# Patient Record
Sex: MALE | Race: BLACK OR AFRICAN AMERICAN | NOT HISPANIC OR LATINO
[De-identification: names, ages, dates, MRNs, and addresses within clinical notes are randomized per-mention and may not be internally consistent; named-entity substitution may affect disease eponyms.]

---

## 2018-03-16 ENCOUNTER — TRANSCRIPTION ENCOUNTER (OUTPATIENT)
Age: 48
End: 2018-03-16

## 2019-06-01 ENCOUNTER — OUTPATIENT (OUTPATIENT)
Dept: OUTPATIENT SERVICES | Facility: HOSPITAL | Age: 49
LOS: 1 days | End: 2019-06-01
Payer: COMMERCIAL

## 2019-06-01 PROCEDURE — G9001: CPT

## 2019-06-04 ENCOUNTER — INPATIENT (INPATIENT)
Facility: HOSPITAL | Age: 49
LOS: 3 days | Discharge: HOME | End: 2019-06-08
Attending: INTERNAL MEDICINE | Admitting: INTERNAL MEDICINE
Payer: MEDICAID

## 2019-06-04 VITALS
TEMPERATURE: 98 F | OXYGEN SATURATION: 100 % | SYSTOLIC BLOOD PRESSURE: 151 MMHG | HEART RATE: 115 BPM | RESPIRATION RATE: 20 BRPM | DIASTOLIC BLOOD PRESSURE: 99 MMHG

## 2019-06-04 LAB
ALBUMIN SERPL ELPH-MCNC: 3.7 G/DL — SIGNIFICANT CHANGE UP (ref 3.3–5)
ALP SERPL-CCNC: 126 U/L — HIGH (ref 40–120)
ALT FLD-CCNC: 171 U/L — HIGH (ref 4–41)
AMPHET UR-MCNC: NEGATIVE — SIGNIFICANT CHANGE UP
ANION GAP SERPL CALC-SCNC: 17 MMO/L — HIGH (ref 7–14)
APAP SERPL-MCNC: < 15 UG/ML — LOW (ref 15–25)
APPEARANCE UR: SIGNIFICANT CHANGE UP
APTT BLD: 32.1 SEC — SIGNIFICANT CHANGE UP (ref 27.5–36.3)
AST SERPL-CCNC: 309 U/L — HIGH (ref 4–40)
BACTERIA # UR AUTO: NEGATIVE — SIGNIFICANT CHANGE UP
BARBITURATES UR SCN-MCNC: NEGATIVE — SIGNIFICANT CHANGE UP
BASE EXCESS BLDV CALC-SCNC: 1.6 MMOL/L — SIGNIFICANT CHANGE UP
BASOPHILS # BLD AUTO: 0.03 K/UL — SIGNIFICANT CHANGE UP (ref 0–0.2)
BASOPHILS NFR BLD AUTO: 0.2 % — SIGNIFICANT CHANGE UP (ref 0–2)
BENZODIAZ UR-MCNC: NEGATIVE — SIGNIFICANT CHANGE UP
BILIRUB SERPL-MCNC: 3.7 MG/DL — HIGH (ref 0.2–1.2)
BILIRUB UR-MCNC: SIGNIFICANT CHANGE UP
BLOOD GAS VENOUS - CREATININE: 0.9 MG/DL — SIGNIFICANT CHANGE UP (ref 0.5–1.3)
BLOOD GAS VENOUS - FIO2: 21 — SIGNIFICANT CHANGE UP
BLOOD UR QL VISUAL: NEGATIVE — SIGNIFICANT CHANGE UP
BUN SERPL-MCNC: 11 MG/DL — SIGNIFICANT CHANGE UP (ref 7–23)
CALCIUM SERPL-MCNC: 9.5 MG/DL — SIGNIFICANT CHANGE UP (ref 8.4–10.5)
CANNABINOIDS UR-MCNC: NEGATIVE — SIGNIFICANT CHANGE UP
CHLORIDE BLDV-SCNC: 101 MMOL/L — SIGNIFICANT CHANGE UP (ref 96–108)
CHLORIDE SERPL-SCNC: 92 MMOL/L — LOW (ref 98–107)
CK SERPL-CCNC: 241 U/L — HIGH (ref 30–200)
CO2 SERPL-SCNC: 21 MMOL/L — LOW (ref 22–31)
COCAINE METAB.OTHER UR-MCNC: NEGATIVE — SIGNIFICANT CHANGE UP
COLOR SPEC: SIGNIFICANT CHANGE UP
CREAT SERPL-MCNC: 0.83 MG/DL — SIGNIFICANT CHANGE UP (ref 0.5–1.3)
EOSINOPHIL # BLD AUTO: 0.02 K/UL — SIGNIFICANT CHANGE UP (ref 0–0.5)
EOSINOPHIL NFR BLD AUTO: 0.2 % — SIGNIFICANT CHANGE UP (ref 0–6)
ETHANOL BLD-MCNC: < 10 MG/DL — SIGNIFICANT CHANGE UP
GAS PNL BLDV: 132 MMOL/L — LOW (ref 136–146)
GLUCOSE BLDV-MCNC: 96 MG/DL — SIGNIFICANT CHANGE UP (ref 70–99)
GLUCOSE SERPL-MCNC: 94 MG/DL — SIGNIFICANT CHANGE UP (ref 70–99)
GLUCOSE UR-MCNC: NEGATIVE — SIGNIFICANT CHANGE UP
HCO3 BLDV-SCNC: 25 MMOL/L — SIGNIFICANT CHANGE UP (ref 20–27)
HCT VFR BLD CALC: 46.3 % — SIGNIFICANT CHANGE UP (ref 39–50)
HCT VFR BLDV CALC: 52.1 % — HIGH (ref 39–51)
HGB BLD-MCNC: 16.8 G/DL — SIGNIFICANT CHANGE UP (ref 13–17)
HGB BLDV-MCNC: 17 G/DL — SIGNIFICANT CHANGE UP (ref 13–17)
HYALINE CASTS # UR AUTO: NEGATIVE — SIGNIFICANT CHANGE UP
IMM GRANULOCYTES NFR BLD AUTO: 0.3 % — SIGNIFICANT CHANGE UP (ref 0–1.5)
INR BLD: 1.17 — SIGNIFICANT CHANGE UP (ref 0.88–1.17)
KETONES UR-MCNC: HIGH
LACTATE BLDV-MCNC: 2.5 MMOL/L — HIGH (ref 0.5–2)
LEUKOCYTE ESTERASE UR-ACNC: NEGATIVE — SIGNIFICANT CHANGE UP
LIDOCAIN IGE QN: 416.1 U/L — HIGH (ref 7–60)
LYMPHOCYTES # BLD AUTO: 17.2 % — SIGNIFICANT CHANGE UP (ref 13–44)
LYMPHOCYTES # BLD AUTO: 2.1 K/UL — SIGNIFICANT CHANGE UP (ref 1–3.3)
MCHC RBC-ENTMCNC: 36.2 PG — HIGH (ref 27–34)
MCHC RBC-ENTMCNC: 36.3 % — HIGH (ref 32–36)
MCV RBC AUTO: 99.8 FL — SIGNIFICANT CHANGE UP (ref 80–100)
METHADONE UR-MCNC: NEGATIVE — SIGNIFICANT CHANGE UP
MONOCYTES # BLD AUTO: 0.68 K/UL — SIGNIFICANT CHANGE UP (ref 0–0.9)
MONOCYTES NFR BLD AUTO: 5.6 % — SIGNIFICANT CHANGE UP (ref 2–14)
NEUTROPHILS # BLD AUTO: 9.32 K/UL — HIGH (ref 1.8–7.4)
NEUTROPHILS NFR BLD AUTO: 76.5 % — SIGNIFICANT CHANGE UP (ref 43–77)
NITRITE UR-MCNC: NEGATIVE — SIGNIFICANT CHANGE UP
NRBC # FLD: 0 K/UL — SIGNIFICANT CHANGE UP (ref 0–0)
OPIATES UR-MCNC: NEGATIVE — SIGNIFICANT CHANGE UP
OXYCODONE UR-MCNC: NEGATIVE — SIGNIFICANT CHANGE UP
PCO2 BLDV: 44 MMHG — SIGNIFICANT CHANGE UP (ref 41–51)
PCP UR-MCNC: NEGATIVE — SIGNIFICANT CHANGE UP
PH BLDV: 7.39 PH — SIGNIFICANT CHANGE UP (ref 7.32–7.43)
PH UR: 7 — SIGNIFICANT CHANGE UP (ref 5–8)
PLATELET # BLD AUTO: 138 K/UL — LOW (ref 150–400)
PMV BLD: 12.5 FL — SIGNIFICANT CHANGE UP (ref 7–13)
PO2 BLDV: 52 MMHG — HIGH (ref 35–40)
POTASSIUM BLDV-SCNC: 5.8 MMOL/L — HIGH (ref 3.4–4.5)
POTASSIUM SERPL-MCNC: 4.9 MMOL/L — SIGNIFICANT CHANGE UP (ref 3.5–5.3)
POTASSIUM SERPL-SCNC: 4.9 MMOL/L — SIGNIFICANT CHANGE UP (ref 3.5–5.3)
PROT SERPL-MCNC: 7.4 G/DL — SIGNIFICANT CHANGE UP (ref 6–8.3)
PROT UR-MCNC: 100 — HIGH
PROTHROM AB SERPL-ACNC: 13.1 SEC — SIGNIFICANT CHANGE UP (ref 9.8–13.1)
RBC # BLD: 4.64 M/UL — SIGNIFICANT CHANGE UP (ref 4.2–5.8)
RBC # FLD: 11.6 % — SIGNIFICANT CHANGE UP (ref 10.3–14.5)
RBC CASTS # UR COMP ASSIST: HIGH (ref 0–?)
SALICYLATES SERPL-MCNC: < 5 MG/DL — LOW (ref 15–30)
SAO2 % BLDV: 82.5 % — SIGNIFICANT CHANGE UP (ref 60–85)
SODIUM SERPL-SCNC: 130 MMOL/L — LOW (ref 135–145)
SP GR SPEC: 1.03 — SIGNIFICANT CHANGE UP (ref 1–1.04)
SQUAMOUS # UR AUTO: SIGNIFICANT CHANGE UP
TSH SERPL-MCNC: 2.31 UIU/ML — SIGNIFICANT CHANGE UP (ref 0.27–4.2)
UROBILINOGEN FLD QL: HIGH
WBC # BLD: 12.19 K/UL — HIGH (ref 3.8–10.5)
WBC # FLD AUTO: 12.19 K/UL — HIGH (ref 3.8–10.5)
WBC UR QL: SIGNIFICANT CHANGE UP (ref 0–?)

## 2019-06-04 PROCEDURE — 76705 ECHO EXAM OF ABDOMEN: CPT | Mod: 26

## 2019-06-04 PROCEDURE — 71046 X-RAY EXAM CHEST 2 VIEWS: CPT | Mod: 26

## 2019-06-04 RX ORDER — KETOROLAC TROMETHAMINE 30 MG/ML
15 SYRINGE (ML) INJECTION ONCE
Refills: 0 | Status: DISCONTINUED | OUTPATIENT
Start: 2019-06-04 | End: 2019-06-04

## 2019-06-04 RX ORDER — ACETAMINOPHEN 500 MG
650 TABLET ORAL ONCE
Refills: 0 | Status: COMPLETED | OUTPATIENT
Start: 2019-06-04 | End: 2019-06-04

## 2019-06-04 RX ORDER — PIPERACILLIN AND TAZOBACTAM 4; .5 G/20ML; G/20ML
3.38 INJECTION, POWDER, LYOPHILIZED, FOR SOLUTION INTRAVENOUS ONCE
Refills: 0 | Status: COMPLETED | OUTPATIENT
Start: 2019-06-04 | End: 2019-06-05

## 2019-06-04 RX ORDER — SODIUM CHLORIDE 9 MG/ML
1000 INJECTION INTRAMUSCULAR; INTRAVENOUS; SUBCUTANEOUS ONCE
Refills: 0 | Status: COMPLETED | OUTPATIENT
Start: 2019-06-04 | End: 2019-06-04

## 2019-06-04 RX ORDER — ONDANSETRON 8 MG/1
4 TABLET, FILM COATED ORAL ONCE
Refills: 0 | Status: COMPLETED | OUTPATIENT
Start: 2019-06-04 | End: 2019-06-04

## 2019-06-04 RX ADMIN — SODIUM CHLORIDE 1000 MILLILITER(S): 9 INJECTION INTRAMUSCULAR; INTRAVENOUS; SUBCUTANEOUS at 22:36

## 2019-06-04 RX ADMIN — Medication 1 MILLIGRAM(S): at 21:12

## 2019-06-04 RX ADMIN — ONDANSETRON 4 MILLIGRAM(S): 8 TABLET, FILM COATED ORAL at 21:11

## 2019-06-04 RX ADMIN — Medication 15 MILLIGRAM(S): at 21:12

## 2019-06-04 RX ADMIN — SODIUM CHLORIDE 1000 MILLILITER(S): 9 INJECTION INTRAMUSCULAR; INTRAVENOUS; SUBCUTANEOUS at 21:13

## 2019-06-04 NOTE — ED PROVIDER NOTE - ATTENDING CONTRIBUTION TO CARE
49 year old male daily drinker presents with abd pain and confusion. + vomiting. concern for etoh withdrawal, appy, gb disease, pancreatitis, electrolyte abns, colitis, uti.  patient febrile elysia give ativan fluids, send cultures.  will cover with abx

## 2019-06-04 NOTE — ED ADULT NURSE NOTE - OBJECTIVE STATEMENT
49y M AaOX4 c/o abdominal pain x1 day. pt noted with non radiating lower abdominal pain of a 7/10. pt febrile upon initial assessment. 49y M AaOX4 c/o abdominal pain x1 day. pt noted with non radiating lower abdominal pain of a 7/10. pt febrile upon initial assessment. pt states that he has been endorsing nausea and vomiting x1 day, pt unable to keep food /liquids down. pt denies cp/palpitations/diarrhea and burning during urination. pt noted with tea colored urine. pt states that he drinks almost everyday of 5-6 beers a day. pt noted to also be anxious and endorsing hand tremors. md made aware.

## 2019-06-04 NOTE — ED PROVIDER NOTE - CLINICAL SUMMARY MEDICAL DECISION MAKING FREE TEXT BOX
abd pain, n/v, fever, pt is daily etoh user, concern for withdrawal  will get labs, Ativan, EKG, Ct abd pelvis, IV hydration, will most likely admit.

## 2019-06-04 NOTE — ED ADULT TRIAGE NOTE - CHIEF COMPLAINT QUOTE
c/o back pain with fever and chills, pt endorses episodes of confusion and inability to focus, pt with tremors in triage, reports drinking 2 beers daily for 2 months, last drink 2 days ago. calm  and cooperative, denies medical Hx.  pt answers to questions appropriately. denies burning on urination, change in urine or foul smell. c/o back pain with fever and chills, pt endorses episodes of confusion and inability to focus, pt with tremors in triage, reports drinking 2 beers daily for 2 months, last drink 2 days ago. calm  and cooperative, denies medical Hx.  pt answers to questions appropriately. denies burning on urination, change in urine or foul smell.    pt family member has come up to desk multiple times screaming about wait time. explained to family about wait time. pt in NAD Will continue to monitor the pt c/o back pain with fever and chills, pt endorses episodes of confusion and inability to focus, pt with tremors in triage, reports drinking 2 beers daily for 2 months, last drink 2 days ago. calm  and cooperative, denies medical Hx.  pt answers to questions appropriately. denies burning on urination, change in urine or foul smell.    pt family member has come up to desk multiple times screaming about wait time. explained to family about wait time. pt in NAD Will continue to monitor the pt    0746 pt sister again came up to triage screaming about the wait time and that her brother is in pain. explained to patient that her brother has to go to main ER due to being a level 2. pt appears comfortable Charge Nurse and OCHOA Vaz made aware c/o back pain with fever and chills, pt endorses episodes of confusion and inability to focus, pt with tremors in triage, reports drinking 2 beers daily for 2 months, last drink 2 days ago. calm  and cooperative, denies medical Hx.  pt answers to questions appropriately. denies burning on urination, change in urine or foul smell.    pt family member has come up to desk multiple times screaming about wait time. explained to family about wait time. pt in NAD Will continue to monitor the pt    746 pm  pt sister again came up to triage screaming about the wait time and that her brother is in pain. explained to patient that her brother has to go to main ER due to being a level 2. pt appears comfortable Charge Nurse and OCHOA Vaz made aware

## 2019-06-04 NOTE — ED ADULT NURSE NOTE - CHIEF COMPLAINT QUOTE
c/o back pain with fever and chills, pt endorses episodes of confusion and inability to focus, pt with tremors in triage, reports drinking 2 beers daily for 2 months, last drink 2 days ago. calm  and cooperative, denies medical Hx.  pt answers to questions appropriately. denies burning on urination, change in urine or foul smell.    pt family member has come up to desk multiple times screaming about wait time. explained to family about wait time. pt in NAD Will continue to monitor the pt    746 pm  pt sister again came up to triage screaming about the wait time and that her brother is in pain. explained to patient that her brother has to go to main ER due to being a level 2. pt appears comfortable Charge Nurse and OCHOA Vaz made aware

## 2019-06-04 NOTE — ED PROVIDER NOTE - OBJECTIVE STATEMENT
Pt is 50 y/o male with Pmxh of HTN (not on meds), HLD, Etoh abuse (pt states he drinks 6 pack of beer daily with the last drink at 10pm, denies hx of withdrawals and DTs) here for eval of abd pain and back pain as well as subjective fever since today (pt is febrile to 101.4 rectally in the ER). 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 no meds taken pta. as per triage note pt is confused - pt A&O x 3, denies HA, dizziness, denies hallucinations, depression, denies SI/HI. denies hx of intrabd sx.

## 2019-06-04 NOTE — ED ADULT NURSE NOTE - NSIMPLEMENTINTERV_GEN_ALL_ED
Implemented All Universal Safety Interventions:  [place] to call system. Call bell, personal items and telephone within reach. Instruct patient to call for assistance. Room bathroom lighting operational. Non-slip footwear when patient is off stretcher. Physically safe environment: no spills, clutter or unnecessary equipment. Stretcher in lowest position, wheels locked, appropriate side rails in place.

## 2019-06-05 DIAGNOSIS — R65.10 SYSTEMIC INFLAMMATORY RESPONSE SYNDROME (SIRS) OF NON-INFECTIOUS ORIGIN WITHOUT ACUTE ORGAN DYSFUNCTION: ICD-10-CM

## 2019-06-05 DIAGNOSIS — K85.20 ALCOHOL INDUCED ACUTE PANCREATITIS WITHOUT NECROSIS OR INFECTION: ICD-10-CM

## 2019-06-05 DIAGNOSIS — F10.230 ALCOHOL DEPENDENCE WITH WITHDRAWAL, UNCOMPLICATED: ICD-10-CM

## 2019-06-05 DIAGNOSIS — Z29.9 ENCOUNTER FOR PROPHYLACTIC MEASURES, UNSPECIFIED: ICD-10-CM

## 2019-06-05 DIAGNOSIS — F17.200 NICOTINE DEPENDENCE, UNSPECIFIED, UNCOMPLICATED: ICD-10-CM

## 2019-06-05 DIAGNOSIS — K85.90 ACUTE PANCREATITIS WITHOUT NECROSIS OR INFECTION, UNSPECIFIED: ICD-10-CM

## 2019-06-05 DIAGNOSIS — F10.10 ALCOHOL ABUSE, UNCOMPLICATED: ICD-10-CM

## 2019-06-05 DIAGNOSIS — K70.10 ALCOHOLIC HEPATITIS WITHOUT ASCITES: ICD-10-CM

## 2019-06-05 LAB
ALBUMIN SERPL ELPH-MCNC: 3 G/DL — LOW (ref 3.3–5)
ALP SERPL-CCNC: 92 U/L — SIGNIFICANT CHANGE UP (ref 40–120)
ALT FLD-CCNC: 106 U/L — HIGH (ref 4–41)
ANION GAP SERPL CALC-SCNC: 14 MMO/L — SIGNIFICANT CHANGE UP (ref 7–14)
AST SERPL-CCNC: 224 U/L — HIGH (ref 4–40)
BASE EXCESS BLDV CALC-SCNC: 1.4 MMOL/L — SIGNIFICANT CHANGE UP
BILIRUB SERPL-MCNC: 3.1 MG/DL — HIGH (ref 0.2–1.2)
BLOOD GAS VENOUS - CREATININE: 0.9 MG/DL — SIGNIFICANT CHANGE UP (ref 0.5–1.3)
BLOOD GAS VENOUS - FIO2: 21 — SIGNIFICANT CHANGE UP
BUN SERPL-MCNC: 9 MG/DL — SIGNIFICANT CHANGE UP (ref 7–23)
CALCIUM SERPL-MCNC: 8.4 MG/DL — SIGNIFICANT CHANGE UP (ref 8.4–10.5)
CHLORIDE BLDV-SCNC: 104 MMOL/L — SIGNIFICANT CHANGE UP (ref 96–108)
CHLORIDE SERPL-SCNC: 100 MMOL/L — SIGNIFICANT CHANGE UP (ref 98–107)
CO2 SERPL-SCNC: 18 MMOL/L — LOW (ref 22–31)
CREAT SERPL-MCNC: 0.74 MG/DL — SIGNIFICANT CHANGE UP (ref 0.5–1.3)
GAS PNL BLDV: 132 MMOL/L — LOW (ref 136–146)
GLUCOSE BLDV-MCNC: 88 MG/DL — SIGNIFICANT CHANGE UP (ref 70–99)
GLUCOSE SERPL-MCNC: 97 MG/DL — SIGNIFICANT CHANGE UP (ref 70–99)
HAV IGM SER-ACNC: NONREACTIVE — SIGNIFICANT CHANGE UP
HBV CORE IGM SER-ACNC: NONREACTIVE — SIGNIFICANT CHANGE UP
HBV SURFACE AG SER-ACNC: NONREACTIVE — SIGNIFICANT CHANGE UP
HCO3 BLDV-SCNC: 25 MMOL/L — SIGNIFICANT CHANGE UP (ref 20–27)
HCT VFR BLDV CALC: 41.2 % — SIGNIFICANT CHANGE UP (ref 39–51)
HCV AB S/CO SERPL IA: 0.16 S/CO — SIGNIFICANT CHANGE UP (ref 0–0.99)
HCV AB SERPL-IMP: SIGNIFICANT CHANGE UP
HGB BLDV-MCNC: 13.4 G/DL — SIGNIFICANT CHANGE UP (ref 13–17)
HIV 1+2 AB+HIV1 P24 AG SERPL QL IA: SIGNIFICANT CHANGE UP
LACTATE BLDV-MCNC: 1 MMOL/L — SIGNIFICANT CHANGE UP (ref 0.5–2)
MAGNESIUM SERPL-MCNC: 1.7 MG/DL — SIGNIFICANT CHANGE UP (ref 1.6–2.6)
PCO2 BLDV: 45 MMHG — SIGNIFICANT CHANGE UP (ref 41–51)
PH BLDV: 7.38 PH — SIGNIFICANT CHANGE UP (ref 7.32–7.43)
PHOSPHATE SERPL-MCNC: SIGNIFICANT CHANGE UP MG/DL (ref 2.5–4.5)
PO2 BLDV: 43 MMHG — HIGH (ref 35–40)
POTASSIUM BLDV-SCNC: 3.5 MMOL/L — SIGNIFICANT CHANGE UP (ref 3.4–4.5)
POTASSIUM SERPL-MCNC: SIGNIFICANT CHANGE UP MMOL/L (ref 3.5–5.3)
POTASSIUM SERPL-SCNC: SIGNIFICANT CHANGE UP MMOL/L (ref 3.5–5.3)
PROT SERPL-MCNC: SIGNIFICANT CHANGE UP G/DL (ref 6–8.3)
SAO2 % BLDV: 72.2 % — SIGNIFICANT CHANGE UP (ref 60–85)
SODIUM SERPL-SCNC: 132 MMOL/L — LOW (ref 135–145)
SPECIMEN SOURCE: SIGNIFICANT CHANGE UP
SPECIMEN SOURCE: SIGNIFICANT CHANGE UP

## 2019-06-05 PROCEDURE — 99223 1ST HOSP IP/OBS HIGH 75: CPT

## 2019-06-05 PROCEDURE — 74177 CT ABD & PELVIS W/CONTRAST: CPT | Mod: 26

## 2019-06-05 PROCEDURE — 12345: CPT | Mod: NC

## 2019-06-05 RX ORDER — ENOXAPARIN SODIUM 100 MG/ML
40 INJECTION SUBCUTANEOUS EVERY 24 HOURS
Refills: 0 | Status: DISCONTINUED | OUTPATIENT
Start: 2019-06-05 | End: 2019-06-08

## 2019-06-05 RX ORDER — SODIUM CHLORIDE 9 MG/ML
1000 INJECTION, SOLUTION INTRAVENOUS
Refills: 0 | Status: DISCONTINUED | OUTPATIENT
Start: 2019-06-05 | End: 2019-06-06

## 2019-06-05 RX ORDER — THIAMINE MONONITRATE (VIT B1) 100 MG
100 TABLET ORAL DAILY
Refills: 0 | Status: DISCONTINUED | OUTPATIENT
Start: 2019-06-05 | End: 2019-06-07

## 2019-06-05 RX ORDER — FOLIC ACID 0.8 MG
1 TABLET ORAL DAILY
Refills: 0 | Status: DISCONTINUED | OUTPATIENT
Start: 2019-06-05 | End: 2019-06-08

## 2019-06-05 RX ORDER — MORPHINE SULFATE 50 MG/1
3 CAPSULE, EXTENDED RELEASE ORAL EVERY 6 HOURS
Refills: 0 | Status: DISCONTINUED | OUTPATIENT
Start: 2019-06-05 | End: 2019-06-07

## 2019-06-05 RX ORDER — MORPHINE SULFATE 50 MG/1
2 CAPSULE, EXTENDED RELEASE ORAL EVERY 4 HOURS
Refills: 0 | Status: DISCONTINUED | OUTPATIENT
Start: 2019-06-05 | End: 2019-06-07

## 2019-06-05 RX ORDER — PIPERACILLIN AND TAZOBACTAM 4; .5 G/20ML; G/20ML
3.38 INJECTION, POWDER, LYOPHILIZED, FOR SOLUTION INTRAVENOUS EVERY 8 HOURS
Refills: 0 | Status: DISCONTINUED | OUTPATIENT
Start: 2019-06-05 | End: 2019-06-05

## 2019-06-05 RX ORDER — ONDANSETRON 8 MG/1
4 TABLET, FILM COATED ORAL EVERY 6 HOURS
Refills: 0 | Status: DISCONTINUED | OUTPATIENT
Start: 2019-06-05 | End: 2019-06-08

## 2019-06-05 RX ADMIN — SODIUM CHLORIDE 125 MILLILITER(S): 9 INJECTION, SOLUTION INTRAVENOUS at 06:46

## 2019-06-05 RX ADMIN — MORPHINE SULFATE 2 MILLIGRAM(S): 50 CAPSULE, EXTENDED RELEASE ORAL at 14:29

## 2019-06-05 RX ADMIN — Medication 15 MILLIGRAM(S): at 02:15

## 2019-06-05 RX ADMIN — Medication 1 MILLIGRAM(S): at 11:51

## 2019-06-05 RX ADMIN — PIPERACILLIN AND TAZOBACTAM 200 GRAM(S): 4; .5 INJECTION, POWDER, LYOPHILIZED, FOR SOLUTION INTRAVENOUS at 02:15

## 2019-06-05 RX ADMIN — SODIUM CHLORIDE 1000 MILLILITER(S): 9 INJECTION INTRAMUSCULAR; INTRAVENOUS; SUBCUTANEOUS at 02:15

## 2019-06-05 RX ADMIN — Medication 100 MILLIGRAM(S): at 06:49

## 2019-06-05 RX ADMIN — ENOXAPARIN SODIUM 40 MILLIGRAM(S): 100 INJECTION SUBCUTANEOUS at 11:51

## 2019-06-05 RX ADMIN — MORPHINE SULFATE 2 MILLIGRAM(S): 50 CAPSULE, EXTENDED RELEASE ORAL at 14:14

## 2019-06-05 RX ADMIN — MORPHINE SULFATE 2 MILLIGRAM(S): 50 CAPSULE, EXTENDED RELEASE ORAL at 07:01

## 2019-06-05 RX ADMIN — MORPHINE SULFATE 2 MILLIGRAM(S): 50 CAPSULE, EXTENDED RELEASE ORAL at 07:28

## 2019-06-05 RX ADMIN — Medication 1 TABLET(S): at 11:51

## 2019-06-05 NOTE — CHART NOTE - NSCHARTNOTEFT_GEN_A_CORE
Patient seen and examined. Chart and labs reviewed.    Still with epigastric pain. Able to eat some Jell-O today. No vomiting. Last drink 2 days ago.    Abdomen is soft, ND. Tender to palpation of epigastrium. No tremors or tongue fasciculations.    Temp of 101.4 overnight can be seen in pancreatitis (inflammatory process). No obvious signs of infection. No pancreatic necrosis noted on CT a/p. UA without e/o UTI. Will d/c abx.    -c/w IV LR, morphine PRN.  -on CIWA protocol given high risk for alcohol withdrawal.

## 2019-06-05 NOTE — ED ADULT NURSE REASSESSMENT NOTE - NS ED NURSE REASSESS COMMENT FT1
report received from ZACH Leblanc, pt AAOx3, VS as noted, in NAD, awaiting CTrd and admission, CIWA as per flowsheet, REGGIE farias, pending further orders, will continue to monitor pt.

## 2019-06-05 NOTE — H&P ADULT - ASSESSMENT
a/w SIRS in the setting of acute interstitial edematous pancreatitis; Found to have hepatomegaly and steatosis; r/o cystitis; 50 yo male, smoker, with MHx of HTN (?taken off BP medication), HLD, EtOH abuse a/w SIRS in the setting of acute interstitial edematous pancreatitis; Found to have hepatomegaly and steatosis; r/o cystitis;

## 2019-06-05 NOTE — H&P ADULT - PROBLEM SELECTOR PLAN 1
Tmax 101.4, HR in 100s; WBC 12.2; Likely due to alcoholic pancreatitis and co-current hepatitis however cannot r/o sepsis due to occult bacteremia; Reports no h/o screening colonoscopy;   -c/w empiric Zosyn IV for now given fever  -Deescalate Abx as appropriate pending cultures and screening results    -f/u BCx, UCx, urine Chlamydia/GC  -f/u screening HIV 1/2 and acute Hepatitis panel   -VS q4 hrs

## 2019-06-05 NOTE — H&P ADULT - PROBLEM SELECTOR PLAN 4
Elevated LFTs; Bilirubin elevated, 3.7, with  AST to ALT ratio of 2:1 suggestive of alcoholic hepatitis; INR/PT wnl  US Abd c/w  hepatomegaly and steatosis, and no evidence of cholelithiasis or sonographic evidence of acute cholecystitis.  -Check acute Hepatitis panel  -Monitor Liver function, DF <10  -Alcohol cessation (abstinence) counseling provided at bedside

## 2019-06-05 NOTE — H&P ADULT - MOTOR
grossly 5/5 flexion-extension b/l LE and UE grossly 5/5 flexion-extension b/l LE and UE  (+) b/l UE tremor

## 2019-06-05 NOTE — H&P ADULT - PROBLEM SELECTOR PLAN 8
Tobacco cessation counseling provided at bedside;  Pt request no nicotine replacement therapy (offered)

## 2019-06-05 NOTE — H&P ADULT - PROBLEM SELECTOR PLAN 2
CT A/P significant for acute interstitial edematous pancreatitis with no associated focal collection; Lipase 416; No evidence of  biliary tree dilatation;   -LR IV hydration  -Morphine IV PRN pain control  -Clears diet  -GI c/s in AM

## 2019-06-05 NOTE — CONSULT NOTE ADULT - SUBJECTIVE AND OBJECTIVE BOX
Patient is a 49y old  Male who presents with a chief complaint of "Episodes of confusion" (2019 18:54)      INTERVAL HPI/OVERNIGHT EVENTS:        PAST MEDICAL & SURGICAL HISTORY:  Dyslipidemia  Hypertension  No significant past surgical history      REVIEW OF SYSTEMS: Total of twelve systems have been reviewed with patient and found to be negative unless mentioned in HPI      SOCIAL HISTORY  Alcohol: Does not drink  Tobacco: Does not smoke  Illicit substance use: None      FAMILY HISTORY: Non contributory to the present illness        No Known Allergies        T(C): 37.2 (19 @ 18:00), Max: 38.6 (19 @ 21:17)  HR: 83 (19 @ 18:00) (77 - 107)  BP: 141/88 (19 @ 18:00) (132/80 - 153/99)  RR: 16 (19 @ 18:00) (16 - 18)  SpO2: 100% (19 @ 18:00) (97% - 100%)        PHYSICAL EXAM:  GENERAL: Not in distress   CHEST/LUNG:  Aire ntry bilaterally  HEART: s1 and s2 present  ABDOMEN:  Nontender and  Nondistended  EXTREMITIES: No pedal  edema  CNS: Awake and Alert      LABS:                        16.8   12.19 )-----------( 138      ( 2019 20:55 )             46.3     -    132<L>  |  100  |  9   ----------------------------<  97  Test not performed SPECIMEN GROSSLY HEMOLYZED   |  18<L>  |  0.74    Ca    8.4      2019 07:25  Phos  Test not performed SPECIMEN GROSSLY HEMOLYZED       Mg     1.7     -    TPro  Test not performed SPECIMEN GROSSLY HEMOLYZED  /  Alb  3.0<L>  /  TBili  3.1<H>  /  DBili  x   /  AST  224<H>  /  ALT  106<H>  /  AlkPhos  92  -    PT/INR - ( 2019 20:55 )   PT: 13.1 SEC;   INR: 1.17          PTT - ( 2019 20:55 )  PTT:32.1 SEC  Urinalysis Basic - ( 2019 20:55 )    Color: BROWN / Appearance: TURBID / S.033 / pH: 7.0  Gluc: NEGATIVE / Ketone: MODERATE  / Bili: TRACE / Urobili: SMALL   Blood: NEGATIVE / Protein: 100 / Nitrite: NEGATIVE   Leuk Esterase: NEGATIVE / RBC: 6-10 / WBC 0-2   Sq Epi: OCC / Non Sq Epi: x / Bacteria: NEGATIVE      CAPILLARY BLOOD GLUCOSE            Urinalysis Basic - ( 2019 20:55 )    Color: BROWN / Appearance: TURBID / S.033 / pH: 7.0  Gluc: NEGATIVE / Ketone: MODERATE  / Bili: TRACE / Urobili: SMALL   Blood: NEGATIVE / Protein: 100 / Nitrite: NEGATIVE   Leuk Esterase: NEGATIVE / RBC: 6-10 / WBC 0-2   Sq Epi: OCC / Non Sq Epi: x / Bacteria: NEGATIVE        MEDICATIONS  (STANDING):  enoxaparin Injectable 40 milliGRAM(s) SubCutaneous every 24 hours  folic acid 1 milliGRAM(s) Oral daily  lactated ringers. 1000 milliLiter(s) (125 mL/Hr) IV Continuous <Continuous>  multivitamin 1 Tablet(s) Oral daily  thiamine Injectable 100 milliGRAM(s) IV Push daily    MEDICATIONS  (PRN):  LORazepam   Injectable 2 milliGRAM(s) IV Push every 2 hours PRN CIWA-Ar score 8 or greater  morphine  - Injectable 2 milliGRAM(s) IV Push every 4 hours PRN Moderate Pain (4 - 6)  morphine  - Injectable 3 milliGRAM(s) IV Push every 6 hours PRN Severe Pain (7 - 10)  ondansetron Injectable 4 milliGRAM(s) IV Push every 6 hours PRN Nausea and/or Vomiting          RADIOLOGY & ADDITIONAL TESTS: Patient is a 49y old  Male who is a smoker,  EtOH abuser,  presents to the ER for evaluation of Abd pain,  and subjective fever. He has no  diarrhea,  no dysuria, NO urinary urgency or frequency, On admission, he found to have fever, tachycardia, leukocytosis and Mild acute interstitial edematous pancreatitis on CT abd/pelvis. He has given a dose of Zosyn, and the ID consult requested to assist with further evaluation and antibiotic management.       REVIEW OF SYSTEMS: Total of twelve systems have been reviewed with patient and found to be negative unless mentioned in HPI        PAST MEDICAL & SURGICAL HISTORY:  Dyslipidemia  Hypertension  No significant past surgical history          SOCIAL HISTORY  Alcohol: Does not drink  Tobacco: Does not smoke  Illicit substance use: None      FAMILY HISTORY: Non contributory to the present illness        ALLERGIES: No Known Allergies        T(C): 37.2 (19 @ 18:00), Max: 38.6 (19 @ 21:17)  HR: 83 (19 @ 18:00) (77 - 107)  BP: 141/88 (19 @ 18:00) (132/80 - 153/99)  RR: 16 (19 @ 18:00) (16 - 18)  SpO2: 100% (19 @ 18:00) (97% - 100%)        PHYSICAL EXAM:  GENERAL: Not in distress   CHEST/LUNG:  Air entry bilaterally  HEART: s1 and s2 present  ABDOMEN: Epigastric tenderness  EXTREMITIES: No pedal  edema  CNS: Awake and Alert        LABS:                        16.8   12.19 )-----------( 138      ( 2019 20:55 )             46.3             132<L>  |  100  |  9   ----------------------------<  97  Test not performed SPECIMEN GROSSLY HEMOLYZED   |  18<L>  |  0.74    Ca    8.4      2019 07:25  Phos  Test not performed SPECIMEN GROSSLY HEMOLYZED       Mg     1.7         TPro  Test not performed SPECIMEN GROSSLY HEMOLYZED  /  Alb  3.0<L>  /  TBili  3.1<H>  /  DBili  x   /  AST  224<H>  /  ALT  106<H>  /  AlkPhos  92      PT/INR - ( 2019 20:55 )   PT: 13.1 SEC;   INR: 1.17     PTT - ( 2019 20:55 )  PTT:32.1 SEC        Urinalysis Basic - ( 2019 20:55 )  Color: BROWN / Appearance: TURBID / S.033 / pH: 7.0  Gluc: NEGATIVE / Ketone: MODERATE  / Bili: TRACE / Urobili: SMALL   Blood: NEGATIVE / Protein: 100 / Nitrite: NEGATIVE   Leuk Esterase: NEGATIVE / RBC: 6-10 / WBC 0-2   Sq Epi: OCC / Non Sq Epi: x / Bacteria: NEGATIVE        MEDICATIONS  (STANDING):  enoxaparin Injectable 40 milliGRAM(s) SubCutaneous every 24 hours  folic acid 1 milliGRAM(s) Oral daily  lactated ringers. 1000 milliLiter(s) (125 mL/Hr) IV Continuous <Continuous>  multivitamin 1 Tablet(s) Oral daily  thiamine Injectable 100 milliGRAM(s) IV Push daily    MEDICATIONS  (PRN):  LORazepam   Injectable 2 milliGRAM(s) IV Push every 2 hours PRN CIWA-Ar score 8 or greater  morphine  - Injectable 2 milliGRAM(s) IV Push every 4 hours PRN Moderate Pain (4 - 6)  morphine  - Injectable 3 milliGRAM(s) IV Push every 6 hours PRN Severe Pain (7 - 10)  ondansetron Injectable 4 milliGRAM(s) IV Push every 6 hours PRN Nausea and/or Vomiting        RADIOLOGY & ADDITIONAL TESTS:    19  : CT Abdomen and Pelvis w/ Oral Cont and w/ IV Cont (19 @ 01:56) Mild acute interstitial edematous pancreatitis. No focal collection. No venous thrombosis or pseudoaneurysm. CTSI score = 2.  No radiodense gallstones. Enlarged fatty liver.  Likely cystitis. Recommend correlation with urinalysis.    < end of copied text >

## 2019-06-05 NOTE — H&P ADULT - HISTORY OF PRESENT ILLNESS
50 y/o male with MHx of HTN, HLD, EtOH abuse; Pt states that he drinks 6 pack of beer daily, the last drink yesterday at 10pm. Reports no hx of withdrawals or DTs. The pt c/o Abd pain, 10/10, radiating to the back and subjective fever since today. States that pain started in lower abd with radiation to b/l flank area as well as lower back, pt has had multiple episodes of nbnb emesis since today morning but reports no diarrhea (normal BM last night), Reports no dysuria, urinary urgency or frequency, Abd trauma, urinary retention, HA, CP, SOB, dizziness, hallucinations; 50 yo male, smoker, with MHx of HTN (taken off BP medication), HLD, EtOH abuse; Pt states that he drinks 6 pack of beer daily, the last drink yesterday at 10pm. Reports no hx of withdrawals or DTs. The pt c/o Abd pain, 10/10, radiating to the back and subjective fever since today. States that pain started in lower abd with radiation to b/l flank area as well as lower back, pt has had multiple episodes of nbnb emesis since today morning but reports no diarrhea (normal BM last night), Reports no dysuria, urinary urgency or frequency, Abd trauma, urinary retention, HA, CP, SOB, dizziness, hallucinations; 48 yo male, smoker, with MHx of HTN (?taken off BP medication), HLD, EtOH abuse; Pt states that he drinks 6 pack of beer daily, the last drink yesterday at 10pm. Reports no hx of withdrawals or DTs. The pt c/o Abd pain, 10/10, radiating to the back and subjective fever since today. States that pain started in lower abd with radiation to b/l flank area as well as lower back, pt has had multiple episodes of nbnb emesis since today morning but reports no diarrhea (normal BM last night), Reports no dysuria, urinary urgency or frequency, Abd trauma, urinary retention, HA, CP, SOB, dizziness, hallucinations;

## 2019-06-05 NOTE — H&P ADULT - NSHPSOCIALHISTORY_GEN_ALL_CORE
Single  Works in construction  Alcohol abuse: 6 beers daily x 20 years  Tobacco use: smokes 1/4 pack/day x 25 years  Reports no illicit drug use

## 2019-06-05 NOTE — SBIRT NOTE. - NSSBIRTSERVICES_GEN_A_ED_FT
Provided SBIRT services: Full screen positive. Referral to Treatment Performed. Screening results were reviewed with the patient and patient was provided information about healthy guidelines and potential negative consequences associated with level of risk. Motivation and readiness to reduce or stop use was discussed and goals and activities to make changes were suggested/offered.  Referral for complete assessment and level of care determination at a certified treatment facility was completed by giving the patient information for treatment facilities that met their needs and encouraging them to call for an appointment.   - Provided pt with referral to [facility]- called together to schedule intake. The times available do not work with pt's schedule right now. Pt stated he will call back and schedule on his own.   Audit Score: 17  DAST Score: 0  Duration = 25 Minutes  Naloxone Rescue Kit dispensed: Pt was educated about Naloxone and trained on how to assemble and utilize the kit. [id number]

## 2019-06-05 NOTE — H&P ADULT - PROBLEM SELECTOR PLAN 6
CT A/P c/w bladder wall thickening and hyperemia suggesting cystitis;  UA not c/w UTI  f/u UCx  f/u urine Chlamydia/GC ABBEY

## 2019-06-05 NOTE — H&P ADULT - LYMPHATIC
anterior cervical L/anterior cervical R/posterior cervical L/supraclavicular L/posterior cervical R/supraclavicular R

## 2019-06-05 NOTE — CONSULT NOTE ADULT - SUBJECTIVE AND OBJECTIVE BOX
Patient is a 49y Male     Patient is a 49y old  Male who presents with a chief complaint of "Episodes of confusion" (05 Jun 2019 05:41)      HPI:  48 yo male, smoker, with MHx of HTN (?taken off BP medication), HLD, EtOH abuse; Pt states that he drinks 6 pack of beer daily, the last drink yesterday at 10pm. Reports no hx of withdrawals or DTs. The pt c/o Abd pain, 10/10, radiating to the back and subjective fever since today. States that pain started in lower abd with radiation to b/l flank area as well as lower back, pt has had multiple episodes of nbnb emesis since today morning but reports no diarrhea (normal BM last night), Reports no dysuria, urinary urgency or frequency, Abd trauma, urinary retention, HA, CP, SOB, dizziness, hallucinations; (05 Jun 2019 05:41)      PAST MEDICAL & SURGICAL HISTORY:  Dyslipidemia  Hypertension  No significant past surgical history      MEDICATIONS  (STANDING):  enoxaparin Injectable 40 milliGRAM(s) SubCutaneous every 24 hours  folic acid 1 milliGRAM(s) Oral daily  lactated ringers. 1000 milliLiter(s) (125 mL/Hr) IV Continuous <Continuous>  multivitamin 1 Tablet(s) Oral daily  thiamine Injectable 100 milliGRAM(s) IV Push daily      Allergies    No Known Allergies    Intolerances        SOCIAL HISTORY:  Denies ETOh,Smoking,     FAMILY HISTORY:  FH: GI cancer: father  Family history of bacterial pneumonia: mother      REVIEW OF SYSTEMS:    CONSTITUTIONAL: No weakness, fevers or chills  EYES/ENT: No visual changes;  No vertigo or throat pain   NECK: No pain or stiffness  RESPIRATORY: No cough, wheezing, hemoptysis; No shortness of breath  CARDIOVASCULAR: No chest pain or palpitations  GASTROINTESTINAL: No abdominal or epigastric pain. No nausea, vomiting, or hematemesis; No diarrhea or constipation. No melena or hematochezia.  GENITOURINARY: No dysuria, frequency or hematuria  NEUROLOGICAL: No numbness or weakness  SKIN: No itching, burning, rashes, or lesions   All other review of systems is negative unless indicated above.    VITAL:  T(C): , Max: 38.6 (06-04-19 @ 21:17)  T(F): , Max: 101.4 (06-04-19 @ 21:17)  HR: 83 (06-05-19 @ 18:00)  BP: 141/88 (06-05-19 @ 18:00)  BP(mean): --  RR: 16 (06-05-19 @ 18:00)  SpO2: 100% (06-05-19 @ 18:00)  Wt(kg): --    I and O's:    Height (cm): 170.18 (06-05 @ 14:01)  Weight (kg): 76.6 (06-05 @ 14:01)  BMI (kg/m2): 26.4 (06-05 @ 14:01)  BSA (m2): 1.88 (06-05 @ 14:01)    PHYSICAL EXAM:    Constitutional: NAD  HEENT: PERRLA,   Neck: No JVD  Respiratory: CTA B/L  Cardiovascular: S1 and S2  Gastrointestinal: BS+, soft, NT/ND  Extremities: No peripheral edema  Neurological: A/O x 3, no focal deficits  Psychiatric: Normal mood, normal affect  : No Li  Skin: No rashes  Access: Not applicable  Back: No CVA tenderness    LABS:                        16.8   12.19 )-----------( 138      ( 04 Jun 2019 20:55 )             46.3     06-05    132<L>  |  100  |  9   ----------------------------<  97  Test not performed SPECIMEN GROSSLY HEMOLYZED   |  18<L>  |  0.74    Ca    8.4      05 Jun 2019 07:25  Phos  Test not performed SPECIMEN GROSSLY HEMOLYZED     06-05  Mg     1.7     06-05    TPro  Test not performed SPECIMEN GROSSLY HEMOLYZED  /  Alb  3.0<L>  /  TBili  3.1<H>  /  DBili  x   /  AST  224<H>  /  ALT  106<H>  /  AlkPhos  92  06-05          RADIOLOGY & ADDITIONAL STUDIES:

## 2019-06-05 NOTE — H&P ADULT - PROBLEM SELECTOR PLAN 5
c/s in AM  Alcohol cessation counseling provided at bedside given end-organ damage, which was communicated to the pt  -Thiamine IV (suspected deficiency)  -MVT, folic acid supp

## 2019-06-05 NOTE — H&P ADULT - NSHPLABSRESULTS_GEN_ALL_CORE
CT ABDOMEN AND PELVIS OC IC    PROCEDURE DATE:  Jun 5 2019   FINDINGS:  LOWER CHEST: Within normal limits.  LIVER: Fatty and enlarged.  BILE DUCTS: Normal caliber.  GALLBLADDER: Within normal limits.  SPLEEN: Within normal limits.  PANCREAS: Diffuse mild peripancreatic fat stranding, most prominently in   the region of head and distal pancreatic body/tail. Normal enhancement   without drainable fluid collection. No ductal dilatation.  ADRENALS: Within normal limits.  KIDNEYS/URETERS: Within normal limits.  BLADDER: Although the urinary bladder is underdistended, there is   apparent wall thickening and hyperemia suggesting cystitis.  REPRODUCTIVE ORGANS: Prostate within normal limits.  BOWEL: No bowel obstruction. Appendix is normal. Small hiatal hernia.  PERITONEUM: No ascites.  VESSELS:  Atherosclerotic changes.  RETROPERITONEUM: No lymphadenopathy.    ABDOMINAL WALL: Within normal limits.  BONES: Degenerative changes of the spine.  IMPRESSION:   Mild acute interstitial edematous pancreatitis. No focal collection. No   venous thrombosis or pseudoaneurysm. CTSI score = 2.  No radiodense gallstones.  Enlarged fatty liver. Likely cystitis. Recommend correlation with urinalysis.      US ABDOMEN LIMITED    PROCEDURE DATE:  Jun 4 2019   COMPARISON: None available.  TECHNIQUE: Sonography of the right upper quadrant.   FINDINGS:  Liver: Enlarged measuring up to 18.6 cm. Diffusely echogenic, consistent   with steatosis.  Bile ducts: Normal caliber. Common bile duct measures 3 mm.   Gallbladder: No cholelithiasis, gallbladder wall thickening, or   pericholecystic fluid. Negative sonographic Argueta's sign.  Pancreas: Not visualized.  Right kidney: 11.8 cm. No hydronephrosis.  Ascites: None  Aorta/IVC: The visualized portions are within normal limits.  IMPRESSION:   No cholelithiasis or sonographic evidence of acute cholecystitis.  Hepatomegaly and steatosis. EKG, 6/4, nsr 99bpm, qtc 431, no acute Tw or ST changes - my reading     CT ABDOMEN AND PELVIS OC IC    PROCEDURE DATE:  Jun 5 2019   FINDINGS:  LOWER CHEST: Within normal limits.  LIVER: Fatty and enlarged.  BILE DUCTS: Normal caliber.  GALLBLADDER: Within normal limits.  SPLEEN: Within normal limits.  PANCREAS: Diffuse mild peripancreatic fat stranding, most prominently in   the region of head and distal pancreatic body/tail. Normal enhancement   without drainable fluid collection. No ductal dilatation.  ADRENALS: Within normal limits.  KIDNEYS/URETERS: Within normal limits.  BLADDER: Although the urinary bladder is underdistended, there is   apparent wall thickening and hyperemia suggesting cystitis.  REPRODUCTIVE ORGANS: Prostate within normal limits.  BOWEL: No bowel obstruction. Appendix is normal. Small hiatal hernia.  PERITONEUM: No ascites.  VESSELS:  Atherosclerotic changes.  RETROPERITONEUM: No lymphadenopathy.    ABDOMINAL WALL: Within normal limits.  BONES: Degenerative changes of the spine.  IMPRESSION:   Mild acute interstitial edematous pancreatitis. No focal collection. No   venous thrombosis or pseudoaneurysm. CTSI score = 2.  No radiodense gallstones.  Enlarged fatty liver. Likely cystitis. Recommend correlation with urinalysis.      US ABDOMEN LIMITED    PROCEDURE DATE:  Jun 4 2019   COMPARISON: None available.  TECHNIQUE: Sonography of the right upper quadrant.   FINDINGS:  Liver: Enlarged measuring up to 18.6 cm. Diffusely echogenic, consistent   with steatosis.  Bile ducts: Normal caliber. Common bile duct measures 3 mm.   Gallbladder: No cholelithiasis, gallbladder wall thickening, or   pericholecystic fluid. Negative sonographic Argueta's sign.  Pancreas: Not visualized.  Right kidney: 11.8 cm. No hydronephrosis.  Ascites: None  Aorta/IVC: The visualized portions are within normal limits.  IMPRESSION:   No cholelithiasis or sonographic evidence of acute cholecystitis.  Hepatomegaly and steatosis.

## 2019-06-06 ENCOUNTER — TRANSCRIPTION ENCOUNTER (OUTPATIENT)
Age: 49
End: 2019-06-06

## 2019-06-06 DIAGNOSIS — E86.0 DEHYDRATION: ICD-10-CM

## 2019-06-06 DIAGNOSIS — Z71.89 OTHER SPECIFIED COUNSELING: ICD-10-CM

## 2019-06-06 DIAGNOSIS — D69.6 THROMBOCYTOPENIA, UNSPECIFIED: ICD-10-CM

## 2019-06-06 DIAGNOSIS — I10 ESSENTIAL (PRIMARY) HYPERTENSION: ICD-10-CM

## 2019-06-06 DIAGNOSIS — E87.1 HYPO-OSMOLALITY AND HYPONATREMIA: ICD-10-CM

## 2019-06-06 LAB
ALBUMIN SERPL ELPH-MCNC: 3.2 G/DL — LOW (ref 3.3–5)
ALP SERPL-CCNC: 107 U/L — SIGNIFICANT CHANGE UP (ref 40–120)
ALT FLD-CCNC: 89 U/L — HIGH (ref 4–41)
ANION GAP SERPL CALC-SCNC: 11 MMO/L — SIGNIFICANT CHANGE UP (ref 7–14)
ANION GAP SERPL CALC-SCNC: 12 MMO/L — SIGNIFICANT CHANGE UP (ref 7–14)
APPEARANCE UR: SIGNIFICANT CHANGE UP
AST SERPL-CCNC: 96 U/L — HIGH (ref 4–40)
BACTERIA # UR AUTO: NEGATIVE — SIGNIFICANT CHANGE UP
BACTERIA UR CULT: SIGNIFICANT CHANGE UP
BASOPHILS # BLD AUTO: 0.02 K/UL — SIGNIFICANT CHANGE UP (ref 0–0.2)
BASOPHILS NFR BLD AUTO: 0.3 % — SIGNIFICANT CHANGE UP (ref 0–2)
BASOPHILS NFR SPEC: 0 % — SIGNIFICANT CHANGE UP (ref 0–2)
BILIRUB DIRECT SERPL-MCNC: 1 MG/DL — HIGH (ref 0.1–0.2)
BILIRUB SERPL-MCNC: 2.7 MG/DL — HIGH (ref 0.2–1.2)
BILIRUB UR-MCNC: SIGNIFICANT CHANGE UP
BLASTS # FLD: 0 % — SIGNIFICANT CHANGE UP (ref 0–0)
BLOOD UR QL VISUAL: NEGATIVE — SIGNIFICANT CHANGE UP
BUN SERPL-MCNC: 4 MG/DL — LOW (ref 7–23)
BUN SERPL-MCNC: 5 MG/DL — LOW (ref 7–23)
CALCIUM SERPL-MCNC: 8.7 MG/DL — SIGNIFICANT CHANGE UP (ref 8.4–10.5)
CALCIUM SERPL-MCNC: 8.7 MG/DL — SIGNIFICANT CHANGE UP (ref 8.4–10.5)
CHLORIDE SERPL-SCNC: 95 MMOL/L — LOW (ref 98–107)
CHLORIDE SERPL-SCNC: 98 MMOL/L — SIGNIFICANT CHANGE UP (ref 98–107)
CO2 SERPL-SCNC: 24 MMOL/L — SIGNIFICANT CHANGE UP (ref 22–31)
CO2 SERPL-SCNC: 26 MMOL/L — SIGNIFICANT CHANGE UP (ref 22–31)
COLOR SPEC: SIGNIFICANT CHANGE UP
CREAT ?TM UR-MCNC: 226.8 MG/DL — SIGNIFICANT CHANGE UP
CREAT SERPL-MCNC: 0.76 MG/DL — SIGNIFICANT CHANGE UP (ref 0.5–1.3)
CREAT SERPL-MCNC: 0.84 MG/DL — SIGNIFICANT CHANGE UP (ref 0.5–1.3)
EOSINOPHIL # BLD AUTO: 0.1 K/UL — SIGNIFICANT CHANGE UP (ref 0–0.5)
EOSINOPHIL NFR BLD AUTO: 1.4 % — SIGNIFICANT CHANGE UP (ref 0–6)
EOSINOPHIL NFR FLD: 0.9 % — SIGNIFICANT CHANGE UP (ref 0–6)
GIANT PLATELETS BLD QL SMEAR: PRESENT — SIGNIFICANT CHANGE UP
GLUCOSE SERPL-MCNC: 137 MG/DL — HIGH (ref 70–99)
GLUCOSE SERPL-MCNC: 90 MG/DL — SIGNIFICANT CHANGE UP (ref 70–99)
GLUCOSE UR-MCNC: NEGATIVE — SIGNIFICANT CHANGE UP
HCT VFR BLD CALC: 41.2 % — SIGNIFICANT CHANGE UP (ref 39–50)
HCT VFR BLD CALC: 41.6 % — SIGNIFICANT CHANGE UP (ref 39–50)
HGB BLD-MCNC: 13.7 G/DL — SIGNIFICANT CHANGE UP (ref 13–17)
HGB BLD-MCNC: 13.7 G/DL — SIGNIFICANT CHANGE UP (ref 13–17)
HYALINE CASTS # UR AUTO: NEGATIVE — SIGNIFICANT CHANGE UP
IMM GRANULOCYTES NFR BLD AUTO: 0.6 % — SIGNIFICANT CHANGE UP (ref 0–1.5)
KETONES UR-MCNC: NEGATIVE — SIGNIFICANT CHANGE UP
LDH SERPL L TO P-CCNC: 228 U/L — HIGH (ref 135–225)
LEUKOCYTE ESTERASE UR-ACNC: NEGATIVE — SIGNIFICANT CHANGE UP
LYMPHOCYTES # BLD AUTO: 1.71 K/UL — SIGNIFICANT CHANGE UP (ref 1–3.3)
LYMPHOCYTES # BLD AUTO: 24.2 % — SIGNIFICANT CHANGE UP (ref 13–44)
LYMPHOCYTES NFR SPEC AUTO: 16.7 % — SIGNIFICANT CHANGE UP (ref 13–44)
MCHC RBC-ENTMCNC: 32.9 % — SIGNIFICANT CHANGE UP (ref 32–36)
MCHC RBC-ENTMCNC: 33.3 % — SIGNIFICANT CHANGE UP (ref 32–36)
MCHC RBC-ENTMCNC: 33.7 PG — SIGNIFICANT CHANGE UP (ref 27–34)
MCHC RBC-ENTMCNC: 34.3 PG — HIGH (ref 27–34)
MCV RBC AUTO: 102.2 FL — HIGH (ref 80–100)
MCV RBC AUTO: 103.3 FL — HIGH (ref 80–100)
METAMYELOCYTES # FLD: 0 % — SIGNIFICANT CHANGE UP (ref 0–1)
MONOCYTES # BLD AUTO: 0.5 K/UL — SIGNIFICANT CHANGE UP (ref 0–0.9)
MONOCYTES NFR BLD AUTO: 7.1 % — SIGNIFICANT CHANGE UP (ref 2–14)
MONOCYTES NFR BLD: 7.9 % — SIGNIFICANT CHANGE UP (ref 2–9)
MYELOCYTES NFR BLD: 0 % — SIGNIFICANT CHANGE UP (ref 0–0)
NEUTROPHIL AB SER-ACNC: 71.9 % — SIGNIFICANT CHANGE UP (ref 43–77)
NEUTROPHILS # BLD AUTO: 4.7 K/UL — SIGNIFICANT CHANGE UP (ref 1.8–7.4)
NEUTROPHILS NFR BLD AUTO: 66.4 % — SIGNIFICANT CHANGE UP (ref 43–77)
NEUTS BAND # BLD: 1.7 % — SIGNIFICANT CHANGE UP (ref 0–6)
NITRITE UR-MCNC: NEGATIVE — SIGNIFICANT CHANGE UP
NRBC # FLD: 0 K/UL — SIGNIFICANT CHANGE UP (ref 0–0)
NRBC # FLD: 0.02 K/UL — SIGNIFICANT CHANGE UP (ref 0–0)
OTHER - HEMATOLOGY %: 0 — SIGNIFICANT CHANGE UP
PH UR: 6.5 — SIGNIFICANT CHANGE UP (ref 5–8)
PLATELET # BLD AUTO: 60 K/UL — LOW (ref 150–400)
PLATELET # BLD AUTO: 65 K/UL — LOW (ref 150–400)
PLATELET COUNT - ESTIMATE: SIGNIFICANT CHANGE UP
PMV BLD: 12.5 FL — SIGNIFICANT CHANGE UP (ref 7–13)
PMV BLD: 13.5 FL — HIGH (ref 7–13)
POTASSIUM SERPL-MCNC: 3.6 MMOL/L — SIGNIFICANT CHANGE UP (ref 3.5–5.3)
POTASSIUM SERPL-MCNC: 3.9 MMOL/L — SIGNIFICANT CHANGE UP (ref 3.5–5.3)
POTASSIUM SERPL-SCNC: 3.6 MMOL/L — SIGNIFICANT CHANGE UP (ref 3.5–5.3)
POTASSIUM SERPL-SCNC: 3.9 MMOL/L — SIGNIFICANT CHANGE UP (ref 3.5–5.3)
PROMYELOCYTES # FLD: 0 % — SIGNIFICANT CHANGE UP (ref 0–0)
PROT SERPL-MCNC: 6.9 G/DL — SIGNIFICANT CHANGE UP (ref 6–8.3)
PROT UR-MCNC: 16.3 MG/DL — SIGNIFICANT CHANGE UP
PROT UR-MCNC: NEGATIVE — SIGNIFICANT CHANGE UP
RBC # BLD: 3.99 M/UL — LOW (ref 4.2–5.8)
RBC # BLD: 4.07 M/UL — LOW (ref 4.2–5.8)
RBC # FLD: 11.5 % — SIGNIFICANT CHANGE UP (ref 10.3–14.5)
RBC # FLD: 11.5 % — SIGNIFICANT CHANGE UP (ref 10.3–14.5)
RBC CASTS # UR COMP ASSIST: SIGNIFICANT CHANGE UP (ref 0–?)
RETICS #: 70 K/UL — SIGNIFICANT CHANGE UP (ref 25–125)
RETICS/RBC NFR: 1.8 % — SIGNIFICANT CHANGE UP (ref 0.5–2.5)
SODIUM SERPL-SCNC: 132 MMOL/L — LOW (ref 135–145)
SODIUM SERPL-SCNC: 134 MMOL/L — LOW (ref 135–145)
SP GR SPEC: 1.02 — SIGNIFICANT CHANGE UP (ref 1–1.04)
SPECIMEN SOURCE: SIGNIFICANT CHANGE UP
SQUAMOUS # UR AUTO: SIGNIFICANT CHANGE UP
URATE SERPL-MCNC: 4.7 MG/DL — SIGNIFICANT CHANGE UP (ref 3.4–8.8)
UROBILINOGEN FLD QL: HIGH
VARIANT LYMPHS # BLD: 0.9 % — SIGNIFICANT CHANGE UP
VIT B12 SERPL-MCNC: 1516 PG/ML — HIGH (ref 200–900)
WBC # BLD: 6.82 K/UL — SIGNIFICANT CHANGE UP (ref 3.8–10.5)
WBC # BLD: 7.07 K/UL — SIGNIFICANT CHANGE UP (ref 3.8–10.5)
WBC # FLD AUTO: 6.82 K/UL — SIGNIFICANT CHANGE UP (ref 3.8–10.5)
WBC # FLD AUTO: 7.07 K/UL — SIGNIFICANT CHANGE UP (ref 3.8–10.5)
WBC UR QL: SIGNIFICANT CHANGE UP (ref 0–?)

## 2019-06-06 RX ORDER — SODIUM CHLORIDE 9 MG/ML
1000 INJECTION, SOLUTION INTRAVENOUS
Refills: 0 | Status: DISCONTINUED | OUTPATIENT
Start: 2019-06-06 | End: 2019-06-07

## 2019-06-06 RX ORDER — ACETAMINOPHEN 500 MG
650 TABLET ORAL EVERY 6 HOURS
Refills: 0 | Status: DISCONTINUED | OUTPATIENT
Start: 2019-06-06 | End: 2019-06-08

## 2019-06-06 RX ADMIN — MORPHINE SULFATE 2 MILLIGRAM(S): 50 CAPSULE, EXTENDED RELEASE ORAL at 14:41

## 2019-06-06 RX ADMIN — Medication 1 TABLET(S): at 11:34

## 2019-06-06 RX ADMIN — Medication 650 MILLIGRAM(S): at 02:00

## 2019-06-06 RX ADMIN — Medication 100 MILLIGRAM(S): at 11:34

## 2019-06-06 RX ADMIN — MORPHINE SULFATE 2 MILLIGRAM(S): 50 CAPSULE, EXTENDED RELEASE ORAL at 15:11

## 2019-06-06 RX ADMIN — Medication 1 MILLIGRAM(S): at 11:34

## 2019-06-06 RX ADMIN — SODIUM CHLORIDE 100 MILLILITER(S): 9 INJECTION, SOLUTION INTRAVENOUS at 11:35

## 2019-06-06 RX ADMIN — MORPHINE SULFATE 2 MILLIGRAM(S): 50 CAPSULE, EXTENDED RELEASE ORAL at 23:10

## 2019-06-06 RX ADMIN — MORPHINE SULFATE 2 MILLIGRAM(S): 50 CAPSULE, EXTENDED RELEASE ORAL at 22:49

## 2019-06-06 RX ADMIN — Medication 650 MILLIGRAM(S): at 01:02

## 2019-06-06 RX ADMIN — SODIUM CHLORIDE 100 MILLILITER(S): 9 INJECTION, SOLUTION INTRAVENOUS at 21:24

## 2019-06-06 NOTE — DISCHARGE NOTE PROVIDER - PROVIDER TOKENS
PROVIDER:[TOKEN:[[id number]]] PROVIDER:[TOKEN:[[id number]]],PROVIDER:[TOKEN:[[id number]],FOLLOWUP:[1 week]]

## 2019-06-06 NOTE — CONSULT NOTE ADULT - SUBJECTIVE AND OBJECTIVE BOX
HPI:  48 yo male, smoker, with MHx of HTN (?taken off BP medication), HLD, EtOH abuse; Pt states that he drinks 6 pack of beer daily, the last drink 1 day PTA at 10pm. Reports no hx of withdrawals or DTs. The pt c/o Abd pain, 10/10, radiating to the back and subjective fever since on day of admission.  States that pain started in lower abd with radiation to b/l flank area as well as lower back, pt has had multiple episodes of nbnb emesis since today morning but reports no diarrhea (normal BM last night), Reports no dysuria, urinary urgency or frequency, Abd trauma, urinary retention, HA, CP, SOB, dizziness, hallucinations;    After admission, CT revealed mild acute interstitial edematous pancreatitis. No radiodense gallstones.  Enlarged fatty liver.  He has since received IVF, Zosyn for sepsis and Morphine IV prn pain.  He reports much improved abdominal pain.     Hematology consulted for worsening thrombocytopenia.  See below for CBC trend.  Pt denies history of epistaxis, spontaneous bleeding/bruising.        PAST MEDICAL HISTORY:  Dyslipidemia     Hypertension.     PAST SURGICAL HISTORY:  No significant past surgical history.     FAMILY HISTORY:  Family history of bacterial pneumonia, mother  FH: GI cancer, father.     Social History:  Social History (marital status, living situation, occupation, tobacco use, alcohol and drug use, and sexual history): Single  Works in construction  Alcohol abuse: 6 beers daily x 20 years  Tobacco use: smokes 1/4 pack/day x 25 years Reports no illicit drug use	      ROS all negative except for as noted per HPI.    acetaminophen   Tablet .. 650 milliGRAM(s) Oral every 6 hours PRN  enoxaparin Injectable 40 milliGRAM(s) SubCutaneous every 24 hours  folic acid 1 milliGRAM(s) Oral daily  lactated ringers. 1000 milliLiter(s) IV Continuous <Continuous>  LORazepam   Injectable 2 milliGRAM(s) IV Push every 2 hours PRN  morphine  - Injectable 2 milliGRAM(s) IV Push every 4 hours PRN  morphine  - Injectable 3 milliGRAM(s) IV Push every 6 hours PRN  multivitamin 1 Tablet(s) Oral daily  ondansetron Injectable 4 milliGRAM(s) IV Push every 6 hours PRN  thiamine Injectable 100 milliGRAM(s) IV Push daily      No Known Allergies      ROS negative except for:    T(C): 36.7 (06-06-19 @ 14:00), Max: 37.4 (06-05-19 @ 20:00)  HR: 75 (06-06-19 @ 14:00) (72 - 84)  BP: 141/83 (06-06-19 @ 14:00) (131/76 - 149/91)  RR: 18 (06-06-19 @ 14:00) (16 - 18)  SpO2: 98% (06-06-19 @ 14:00) (98% - 100%)  PHYSICAL EXAM  General:  No acute distress, well nourished  HEENT:  NC/AT, EOMI, +icterus.  Lungs:  Clear to auscultation bilaterally  CV:   S1, S2.  Regular rate, rhythm.  No murmurs appreciated  Abdomen:   soft, nontender, nondistended.  No organomegaly  Extremities:  no clubbing, cyanosis, or edema  Neurological:   Cranial nerves intact, grossly non-focal.  Psychiatric:  Appropriate for situation  Skin:   no rash or ulcers appreciated                            13.7   6.82  )-----------( 60       ( 06 Jun 2019 11:55 )             41.6                         13.7   7.07  )-----------( 65       ( 06 Jun 2019 06:19 )             41.2                         16.8   12.19 )-----------( 138      ( 04 Jun 2019 20:55 )             46.3       Mean Cell Volume: 102.2 fL (06.06.19 @ 11:55)    Reticulocyte Percent: 1.8 % (06.06.19 @ 11:55)    06-06    132<L>  |  95<L>  |  5<L>  ----------------------------<  137<H>  3.6   |  26  |  0.84    Ca    8.7      06 Jun 2019 11:55  Phos  Test not performed SPECIMEN GROSSLY HEMOLYZED     06-05  Mg     1.7     06-05    TPro  x   /  Alb  x   /  TBili  x   /  DBili  1.0<H>  /  AST  x   /  ALT  x   /  AlkPhos  x   06-06      Bilirubin Direct, Serum: 1.0 mg/dL (06.06.19 @ 11:55)    Bilirubin Total, Serum: 2.7 mg/dL (06.06.19 @ 06:19)    Lactate Dehydrogenase, Serum: 228 U/L (06.06.19 @ 11:55)

## 2019-06-06 NOTE — DISCHARGE NOTE PROVIDER - CARE PROVIDER_API CALL
Jere Ho)  Medicine  [address] [place] [address]  Phone: [phone]  Fax: [phone]  Follow Up Time: Jere Ho)  Medicine  [address] [place] [address]  Phone: [phone]  Fax: [phone]  Follow Up Time:     Grant Chin)  Hematology; Medical Oncology  [address] [facility] [address]  [place] [address]  Phone: [phone]  Fax: [phone]  Follow Up Time: 1 week

## 2019-06-06 NOTE — DISCHARGE NOTE PROVIDER - NSDCCPCAREPLAN_GEN_ALL_CORE_FT
PRINCIPAL DISCHARGE DIAGNOSIS  Diagnosis: Pancreatitis  Assessment and Plan of Treatment: Continue to stay hydrated   Follow up with your primary care doctor within one week for follow up care you will need blood work in about a week        SECONDARY DISCHARGE DIAGNOSES  Diagnosis: Thrombocytopenia  Assessment and Plan of Treatment: Thrombocytopenia    Diagnosis: Alcohol withdrawal  Assessment and Plan of Treatment: Abstain from alcohol.  [facility] national help line for alcohol abuse- [phone] ([phone]) please call for help with quitting alcohol . Continue to  abstain from alcohol       Diagnosis: Smoker  Assessment and Plan of Treatment: Smoker  Please refrain from PRINCIPAL DISCHARGE DIAGNOSIS  Diagnosis: Pancreatitis  Assessment and Plan of Treatment: Alcohol induced  Continue to stay hydrated   Follow up with your primary care doctor within one week for follow up care you will need blood work in about a week        SECONDARY DISCHARGE DIAGNOSES  Diagnosis: Thrombocytopenia  Assessment and Plan of Treatment: Thrombocytopenia  thrombocytopenia related to hepatic disease as a result of alcohol use  follow up with hematologist within 1-2 weeks for follow up monitoring of CBC   follow up with primary care doctor within one week    Diagnosis: BP (high blood pressure)  Assessment and Plan of Treatment: Your blood pressure was noted to be elevated during this admission. NO medications were started. You need to follow up as an outpatient with Dr Restrepo within 1 week for blood pressure monitoring  Please refrain from using salt as this can raise your blood pressure. Low sodium and fat diet,  and follow up with primary care physician.    Diagnosis: Alcohol withdrawal  Assessment and Plan of Treatment: Abstain from alcohol.  [facility] national help line for alcohol abuse- [phone] [phone]) please call for help with quitting alcohol . Continue to  abstain from alcohol       Diagnosis: Smoker  Assessment and Plan of Treatment: Smoker  Please refrain from smoking   continue to use nicotine patches   call [facility] smoking cessation [phone] they can offer counseling and support as well as nicotine cessation devices (patches/gum)

## 2019-06-06 NOTE — DISCHARGE NOTE PROVIDER - HOSPITAL COURSE
50 yo male, smoker, with MHx of HTN (?taken off BP medication), HLD, EtOH abuse a/w SIRS in the setting of acute interstitial edematous pancreatitis         Problem/Plan - 1:    ·  Problem: SIRS (systemic inflammatory response syndrome).  Plan: improved, no infection identified, likely reactive fever 2/2 acute pancreatitis, cultures negative to date.          Problem/Plan - 2:    ·  Problem: Alcohol-induced acute pancreatitis without infection or necrosis.  Plan: improving    fluid hydration, advance diet as tolerated, analgesics prn, pt educated on alcohol abstinence, improving lytes    d/c planning 6/7/19.          Problem/Plan - 3:    ·  Problem: Alcohol withdrawal syndrome without complication.  Plan: CIWA    Ativan per CIWA protocol    abstinence education provided.          Problem/Plan - 4:    ·  Problem: Alcoholic hepatitis without ascites.  Plan: Elevated LFTs; Bilirubin elevated, 3.7, with  AST to ALT ratio of 2:1 suggestive of alcoholic hepatitis    US Abd c/w  hepatomegaly and steatosis, and no evidence of cholelithiasis or sonographic evidence of acute cholecystitis.    -Alcohol cessation (abstinence) counseling provided at bedside.          Problem/Plan - 5:    ·  Problem: Alcohol abuse.  Plan:  on board    Alcohol cessation counseling provided at bedsid    c/w rx.          Problem/Plan - 6:    Problem: Essential hypertension. Plan: hemodynamically stable    c/w lifestyle modifications.         Problem/Plan - 7:    ·  Problem: Smoker.  Plan: Tobacco cessation counseling provided at bedside;    Pt request no nicotine replacement therapy (offered).          Problem/Plan - 8:    ·  Problem: Thrombocytopenia.  Plan: can be 2/2 hepatitis and hepatosteatosis    no signs of bleeding    monitor labs, hem/onc on board.     As per heme onc Suspect thrombocytopenia related to hepatic disease.              Problem/Plan - 9:    ·  Problem: Hyponatremia.  Plan: on admission     improving    fluid hydration.          Problem/Plan - 10:    Problem: Severe dehydration. Plan; on admission     improving    fluid hydration.

## 2019-06-06 NOTE — CONSULT NOTE ADULT - ATTENDING COMMENTS
[facility] NEPHROLOGY  Kevin Uribe M.D.  Chilo Gifford D.O.  Nadine Soto M.D.  Susan Connelly, MSN, ANP-C    Telephone: [phone]  Facsimile: [phone]    [address] [place] [address]

## 2019-06-06 NOTE — CONSULT NOTE ADULT - ASSESSMENT
49y Male with history of HLD presents with epigastric pain secondary to alcoholic pancreatitis. Nephrology consulted for electrolyte abnormalities.    1) Hyponatremia: Secondary to tea and toast diet resolving with IVF. Check hyponatremia work up. Continue with IVF for another 24 hours as patient remains on clear liquid diet. Monitor serum sodium.    2) HTN: BP borderline secondary to IVF? Monitor off of anti-hypertensive medications for now.     3) Proteinuria: likely secondary to concentrated urine drawn after CT with contrast (high osmolar load). Check spot urine TP/CR to quantify.    4) Microscopic hematuria: Repeat UA. No stones identified on CT A/P.
50 yo male, smoker, with h/o HTN HLD, EtOH abuse admitted with acute ETOH induced pancreatitis. Noted on CT to have enlarged fatty liver.  He has since received IVF, Zosyn for sepsis and Morphine IV prn pain.      Hematology consulted for worsening thrombocytopenia.  Pt denies history of epistaxis, spontaneous bleeding/bruising.      Noted change in CBC since admission likely from hemodilution.  Suspect thrombocytopenia related to hepatic disease.  Noted bilirubin elevation is likely hepatic of origin given both elevation in direct component.      RECOMMEND:  1)  Monitor CBC daily.  If further decrease in CBC, would re-check PT, PTT, D-dimer, Fibrinogen to rule out DIC  2)  Pancreatitis management as per Medical team  3)  Check B12/Folate levels to r/o deficiency in either given noted elevated MCV (likely ETOH abuse related).    Will follow with you.    Luiz Hernández MD  Hematology/Oncology  Cell:  [phone]  Office Phone: [phone]  Office Fax:  [phone]  [facility] [place] [address]
etoh pancreaitis. discussed pathophysiology with patient, clear, doing ok. follow wbc in am. etoh abstinence discussed. will follow with you.  fluids and pain meds approprrate
Patient is a 49y old  Male who is a smoker,  EtOH abuser,  presents to the ER for evaluation of Abd pain,  and subjective fever. He has no  diarrhea,  no dysuria, NO urinary urgency or frequency, On admission, he found to have fever, tachycardia, leukocytosis and Mild acute interstitial edematous pancreatitis on CT abd/pelvis. He has given a dose of Zosyn, and the ID consult requested to assist with further evaluation and antibiotic management.     # Sepsis ( Fever + tachycardia + Pancreatitis)   # Acute Pancreatitis    would recommend:    1. Follow up cultures  2. Monitor WBC count  3. NPO, ( Advanced diet as tolerated)  IVF and Pain management as needed  4. Monitor OFF antibiotics    will follow the patient with you and make further recommendation based on the clinical course and Lab results  Thank you for the opportunity to participate in Mr. LOERA's care

## 2019-06-06 NOTE — CONSULT NOTE ADULT - SUBJECTIVE AND OBJECTIVE BOX
[facility] NEPHROLOGY- CONSULTATION NOTE    49y Male with history of below presents with epigastric pain secondary to alcoholic pancreatitis. Nephrology consulted for electrolyte abnormalities.    Patient denies any prior history of hyponatremia, SSRI, antiepileptic or diuretic use. Patient denies admits to nausea PTA without any significant vomiting or diarrhea. Patient admits to large fluid intake (predominantly alcohol) without solute intake. Patient given IVF with improvement in serum sodium this morning.    REVIEW OF SYSTEMS:  Gen: no changes in weight  HEENT: no rhinorrhea  Neck: no sore throat  Cards: no chest pain  Resp: no dyspnea  GI: + nausea resolving, no vomiting or diarrhea  : no dysuria or hematuria  Vascular: no LE edema  Derm: no rashes  Neuro: no numbness/tingling    No Known Allergies      Home Medications Reviewed  Hospital Medications:   MEDICATIONS  (STANDING):  enoxaparin Injectable 40 milliGRAM(s) SubCutaneous every 24 hours  folic acid 1 milliGRAM(s) Oral daily  lactated ringers. 1000 milliLiter(s) (100 mL/Hr) IV Continuous <Continuous>  multivitamin 1 Tablet(s) Oral daily  thiamine Injectable 100 milliGRAM(s) IV Push daily      PAST MEDICAL & SURGICAL HISTORY:  Dyslipidemia  Hypertension  No significant past surgical history      FAMILY HISTORY:  FH: GI cancer: father  Family history of bacterial pneumonia: mother      SOCIAL HISTORY:  Denies toxic substance use     VITALS:  T(F): 98.1 (19 @ 14:00), Max: 99.4 (19 @ 20:00)  HR: 75 (19 @ 14:00)  BP: 141/83 (19 @ 14:00)  RR: 18 (19 @ 14:00)  SpO2: 98% (19 @ 14:00)  Wt(kg): --        PHYSICAL EXAM:  Gen: NAD, calm  HEENT: MMM  Neck: no JVD  Cards: RRR, +S1/S2, no M/G/R  Resp: CTA B/L  GI: soft, NT/ND, NABS  : no CVA tenderness  Vascular: no LE edema B/L  Derm: no rashes  Neuro: non-focal    LABS:      132<L>  |  95<L>  |  5<L>  ----------------------------<  137<H>  3.6   |  26  |  0.84    Ca    8.7      2019 11:55  Phos  Test not performed SPECIMEN GROSSLY HEMOLYZED       Mg     1.7         TPro  6.9  /  Alb  3.2<L>  /  TBili  2.7<H>  /  DBili      /  AST  96<H>  /  ALT  89<H>  /  AlkPhos  107      Creatinine Trend: 0.84 <--, 0.76 <--, 0.74 <--, 0.83 <--                        13.7   6.82  )-----------( 60       ( 2019 11:55 )             41.6     Urine Studies:  Urinalysis Basic - ( 2019 20:55 )    Color: BROWN / Appearance: TURBID / S.033 / pH: 7.0  Gluc: NEGATIVE / Ketone: MODERATE  / Bili: TRACE / Urobili: SMALL   Blood: NEGATIVE / Protein: 100 / Nitrite: NEGATIVE   Leuk Esterase: NEGATIVE / RBC: 6-10 / WBC 0-2   Sq Epi: OCC / Non Sq Epi:  / Bacteria: NEGATIVE          RADIOLOGY & ADDITIONAL STUDIES:  < from: Xray Chest 2 Views PA/Lat (19 @ 21:17) >  IMPRESSION:     No intraperitoneal free air.    Clear lungs.    < end of copied text >      < from: CT Abdomen and Pelvis w/ Oral Cont and w/ IV Cont (19 @ 01:56) >  IMPRESSION:     Mild acute interstitial edematous pancreatitis. No focal collection. No   venous thrombosis or pseudoaneurysm. CTSI score = 2.  No radiodense gallstones.  Enlarged fatty liver.  Likely cystitis. Recommend correlation with urinalysis.    < end of copied text >      < from: CT Abdomen and Pelvis w/ Oral Cont and w/ IV Cont (19 @ 01:56) >  KIDNEYS/URETERS: Within normal limits.    BLADDER: Although the urinary bladder is underdistended, there is   apparent wall thickening and hyperemia suggesting cystitis.    < end of copied text >

## 2019-06-07 LAB
ANION GAP SERPL CALC-SCNC: 10 MMO/L — SIGNIFICANT CHANGE UP (ref 7–14)
BUN SERPL-MCNC: 5 MG/DL — LOW (ref 7–23)
CALCIUM SERPL-MCNC: 8.6 MG/DL — SIGNIFICANT CHANGE UP (ref 8.4–10.5)
CHLORIDE SERPL-SCNC: 102 MMOL/L — SIGNIFICANT CHANGE UP (ref 98–107)
CO2 SERPL-SCNC: 26 MMOL/L — SIGNIFICANT CHANGE UP (ref 22–31)
CORTIS SERPL-MCNC: 9.8 UG/DL — SIGNIFICANT CHANGE UP (ref 2.7–18.4)
CREAT SERPL-MCNC: 0.82 MG/DL — SIGNIFICANT CHANGE UP (ref 0.5–1.3)
FOLATE SERPL-MCNC: 16.1 NG/ML — SIGNIFICANT CHANGE UP (ref 4.7–20)
GLUCOSE SERPL-MCNC: 84 MG/DL — SIGNIFICANT CHANGE UP (ref 70–99)
HCT VFR BLD CALC: 40.5 % — SIGNIFICANT CHANGE UP (ref 39–50)
HGB BLD-MCNC: 13.1 G/DL — SIGNIFICANT CHANGE UP (ref 13–17)
MCHC RBC-ENTMCNC: 32.3 % — SIGNIFICANT CHANGE UP (ref 32–36)
MCHC RBC-ENTMCNC: 33.8 PG — SIGNIFICANT CHANGE UP (ref 27–34)
MCV RBC AUTO: 104.4 FL — HIGH (ref 80–100)
NRBC # FLD: 0 K/UL — SIGNIFICANT CHANGE UP (ref 0–0)
OSMOLALITY SERPL: 290 MOSMO/KG — SIGNIFICANT CHANGE UP (ref 275–295)
PLATELET # BLD AUTO: 65 K/UL — LOW (ref 150–400)
PMV BLD: 13.1 FL — HIGH (ref 7–13)
POTASSIUM SERPL-MCNC: 4 MMOL/L — SIGNIFICANT CHANGE UP (ref 3.5–5.3)
POTASSIUM SERPL-SCNC: 4 MMOL/L — SIGNIFICANT CHANGE UP (ref 3.5–5.3)
RBC # BLD: 3.88 M/UL — LOW (ref 4.2–5.8)
RBC # FLD: 11.5 % — SIGNIFICANT CHANGE UP (ref 10.3–14.5)
SODIUM SERPL-SCNC: 138 MMOL/L — SIGNIFICANT CHANGE UP (ref 135–145)
TSH SERPL-MCNC: 6 UIU/ML — HIGH (ref 0.27–4.2)
URATE SERPL-MCNC: 4.5 MG/DL — SIGNIFICANT CHANGE UP (ref 3.4–8.8)
WBC # BLD: 5.11 K/UL — SIGNIFICANT CHANGE UP (ref 3.8–10.5)
WBC # FLD AUTO: 5.11 K/UL — SIGNIFICANT CHANGE UP (ref 3.8–10.5)

## 2019-06-07 RX ORDER — MORPHINE SULFATE 50 MG/1
2 CAPSULE, EXTENDED RELEASE ORAL EVERY 6 HOURS
Refills: 0 | Status: DISCONTINUED | OUTPATIENT
Start: 2019-06-07 | End: 2019-06-07

## 2019-06-07 RX ORDER — THIAMINE MONONITRATE (VIT B1) 100 MG
100 TABLET ORAL DAILY
Refills: 0 | Status: DISCONTINUED | OUTPATIENT
Start: 2019-06-07 | End: 2019-06-08

## 2019-06-07 RX ORDER — TRAMADOL HYDROCHLORIDE 50 MG/1
25 TABLET ORAL
Refills: 0 | Status: DISCONTINUED | OUTPATIENT
Start: 2019-06-07 | End: 2019-06-08

## 2019-06-07 RX ADMIN — TRAMADOL HYDROCHLORIDE 25 MILLIGRAM(S): 50 TABLET ORAL at 20:30

## 2019-06-07 RX ADMIN — Medication 100 MILLIGRAM(S): at 11:29

## 2019-06-07 RX ADMIN — Medication 1 MILLIGRAM(S): at 11:29

## 2019-06-07 RX ADMIN — Medication 1 TABLET(S): at 11:29

## 2019-06-07 RX ADMIN — TRAMADOL HYDROCHLORIDE 25 MILLIGRAM(S): 50 TABLET ORAL at 21:34

## 2019-06-07 NOTE — PROGRESS NOTE ADULT - ATTENDING COMMENTS
[facility] NEPHROLOGY  Kevin Uribe M.D.  Chilo Gifford D.O.  Nadine Soto M.D.  Susan Connelly, MSN, ANP-C    Telephone: [phone]  Facsimile: [phone]    [address] [place] [address]

## 2019-06-07 NOTE — CHART NOTE - NSCHARTNOTEFT_GEN_A_CORE
pt tolerating diet, and without pain, had a BM as per RN .  S/w Dr Ho - IVF discontinued, IV pain med discontinued, patient to be d/c in AM if continue to tolerate diet

## 2019-06-08 ENCOUNTER — TRANSCRIPTION ENCOUNTER (OUTPATIENT)
Age: 49
End: 2019-06-08

## 2019-06-08 VITALS
HEART RATE: 60 BPM | TEMPERATURE: 98 F | OXYGEN SATURATION: 98 % | RESPIRATION RATE: 18 BRPM | SYSTOLIC BLOOD PRESSURE: 152 MMHG | DIASTOLIC BLOOD PRESSURE: 90 MMHG

## 2019-06-08 LAB
ANION GAP SERPL CALC-SCNC: 10 MMO/L — SIGNIFICANT CHANGE UP (ref 7–14)
BUN SERPL-MCNC: 6 MG/DL — LOW (ref 7–23)
CALCIUM SERPL-MCNC: 9.4 MG/DL — SIGNIFICANT CHANGE UP (ref 8.4–10.5)
CHLORIDE SERPL-SCNC: 101 MMOL/L — SIGNIFICANT CHANGE UP (ref 98–107)
CO2 SERPL-SCNC: 27 MMOL/L — SIGNIFICANT CHANGE UP (ref 22–31)
CREAT SERPL-MCNC: 0.79 MG/DL — SIGNIFICANT CHANGE UP (ref 0.5–1.3)
GLUCOSE SERPL-MCNC: 98 MG/DL — SIGNIFICANT CHANGE UP (ref 70–99)
HCT VFR BLD CALC: 39.6 % — SIGNIFICANT CHANGE UP (ref 39–50)
HGB BLD-MCNC: 12.7 G/DL — LOW (ref 13–17)
MCHC RBC-ENTMCNC: 32.1 % — SIGNIFICANT CHANGE UP (ref 32–36)
MCHC RBC-ENTMCNC: 34 PG — SIGNIFICANT CHANGE UP (ref 27–34)
MCV RBC AUTO: 105.9 FL — HIGH (ref 80–100)
NRBC # FLD: 0 K/UL — SIGNIFICANT CHANGE UP (ref 0–0)
PLATELET # BLD AUTO: 79 K/UL — LOW (ref 150–400)
PMV BLD: 12.8 FL — SIGNIFICANT CHANGE UP (ref 7–13)
POTASSIUM SERPL-MCNC: 4.5 MMOL/L — SIGNIFICANT CHANGE UP (ref 3.5–5.3)
POTASSIUM SERPL-SCNC: 4.5 MMOL/L — SIGNIFICANT CHANGE UP (ref 3.5–5.3)
RBC # BLD: 3.74 M/UL — LOW (ref 4.2–5.8)
RBC # FLD: 11.4 % — SIGNIFICANT CHANGE UP (ref 10.3–14.5)
SODIUM SERPL-SCNC: 138 MMOL/L — SIGNIFICANT CHANGE UP (ref 135–145)
WBC # BLD: 5.11 K/UL — SIGNIFICANT CHANGE UP (ref 3.8–10.5)
WBC # FLD AUTO: 5.11 K/UL — SIGNIFICANT CHANGE UP (ref 3.8–10.5)

## 2019-06-08 RX ORDER — TRAMADOL HYDROCHLORIDE 50 MG/1
25 TABLET ORAL
Qty: 10 | Refills: 0
Start: 2019-06-08 | End: 2019-06-12

## 2019-06-08 RX ORDER — THIAMINE MONONITRATE (VIT B1) 100 MG
1 TABLET ORAL
Qty: 0 | Refills: 0 | DISCHARGE
Start: 2019-06-08

## 2019-06-08 RX ORDER — FOLIC ACID 0.8 MG
1 TABLET ORAL
Qty: 0 | Refills: 0 | DISCHARGE
Start: 2019-06-08

## 2019-06-08 NOTE — PROGRESS NOTE ADULT - ASSESSMENT
Assessment:    1.  Pancreatitis  2.  Hypertension  3.  EtOH abuse  4.  Hyperlipidemia    Plan:    1.  Management as per GI  2.  Patient's heart rate and blood pressure has been reasonably controlled.  Patient is stable from a cardiac standpoint and at this point no further cardiac work up is indicated.         Thank you     Plan was discussed with Dr. Ashley Rodriguez    [facility]  Ashley Rodriguez D.O., [facility], JERAD Macedo, MSN, AGPCNP-[address], [facility] [address]  [place] [address]  [phone]
pt still with pain but improved, resolving pancreatitis. would advnace diet tomorrow if improved pain
1. Thrombocytopenia    -- platelets stable  -- unclear what baseline is  -- likely consumption sec to pancreatitis +/- Marrow suppression from EtOH  -- If further decrease in CBC, would re-check PT, PTT, D-dimer, Fibrinogen to rule out DIC  -- B12 Normal  -- Mild elevation in LDH Normal H/H  -- macrocytosis likely sec to EtOH    Grant Chin MD  [phone]
48 yo male, smoker, with MHx of HTN (?taken off BP medication), HLD, EtOH abuse a/w SIRS in the setting of acute interstitial edematous pancreatitis
49y Male with history of HLD presents with epigastric pain secondary to alcoholic pancreatitis. Nephrology consulted for electrolyte abnormalities.    1) Hyponatremia: Secondary to tea and toast diet resolved with IVF. Can discontinue IVF if patient tolerating diet. Monitor serum sodium.    2) HTN: BP controlled. Monitor off of anti-hypertensive medications for now.     3) Proteinuria: likely secondary to concentrated urine as UA drawn after CT with contrast (high osmolar load) and spot urine TP/CR negative. No further work up needed.    4) Microscopic hematuria: Resolved on repeat UA. No further work up needed.    Will sign off. Please call with questions.
50 yo male, smoker, with MHx of HTN (?taken off BP medication), HLD, EtOH abuse a/w SIRS in the setting of acute interstitial edematous pancreatitis
Assessment:    1.  Pancreatitis  2.  Hypertension  3.  EtOH abuse  4.  Hyperlipidemia    Plan:    1.  Management as per GI  2.  The etiology of patient's mildly elevated blood pressure and heart rate may be secondary to EtOH withdrawal.  Recommend fluid hydration and monitor for DTs.  Hold off on antihypertensives for now.        Thank you for the courtesy of this consult    Plan was discussed with Dr. Ashley Rodriguez    [facility]  Ashley Rodriguez D.O., [facility], JERAD Macedo, MSN, [employer] [facility] ClinicNP-[address], [address]  [place] [address]  [phone]
Patient is a 49y old  Male who is a smoker,  EtOH abuser,  presents to the ER for evaluation of Abd pain,  and subjective fever. He has no  diarrhea,  no dysuria, NO urinary urgency or frequency, On admission, he found to have fever, tachycardia, leukocytosis and Mild acute interstitial edematous pancreatitis on CT abd/pelvis. He has given a dose of Zosyn, and the ID consult requested to assist with further evaluation and antibiotic management.     # Sepsis ( Fever + tachycardia + Pancreatitis)  # Acute Pancreatitis - most likely due to alcohol    would recommend:    1. Monitor OFF antibiotics  2. Pain management as needed  3. OOB to chair  4. Monitor  LFTs, is trending down    d/w patient     will follow the patient with you
platelet count / lfts, ? cirrhosi vs acute alc hep.  pt with pancreatiits, still pain.  ? npo getting pain meds
tolerating po.  continue current r.
50 yo male, smoker, with MHx of HTN (?taken off BP medication), HLD, EtOH abuse a/w SIRS in the setting of acute interstitial edematous pancreatitis

## 2019-06-08 NOTE — PROGRESS NOTE ADULT - PROBLEM SELECTOR PLAN 5
on board  Alcohol cessation counseling provided at [facility]  c/w rx
 on board  Alcohol cessation counseling provided at [facility]  c/w rx
Alcohol cessation counseling provided at bedside

## 2019-06-08 NOTE — PROGRESS NOTE ADULT - REASON FOR ADMISSION
"Episodes of confusion"

## 2019-06-08 NOTE — PROGRESS NOTE ADULT - PROVIDER SPECIALTY LIST ADULT
Cardiology
Gastroenterology
Gastroenterology
Heme/Onc
Infectious Disease
Internal Medicine
Internal Medicine
Nephrology
Cardiology
Gastroenterology
Internal Medicine

## 2019-06-08 NOTE — PROGRESS NOTE ADULT - PROBLEM SELECTOR PROBLEM 2
Alcohol-induced acute pancreatitis without infection or necrosis
Alcohol withdrawal syndrome without complication
Alcohol withdrawal syndrome without complication

## 2019-06-08 NOTE — PROGRESS NOTE ADULT - PROBLEM SELECTOR PLAN 8
can be 2/2 hepatitis and hepatosteatosis  no signs of bleeding  monitor labs, hem/onc on board
improved
improved

## 2019-06-08 NOTE — PROGRESS NOTE ADULT - PROBLEM SELECTOR PROBLEM 3
Alcohol withdrawal syndrome without complication
Alcoholic hepatitis without ascites
Alcoholic hepatitis without ascites

## 2019-06-08 NOTE — PROGRESS NOTE ADULT - PROBLEM SELECTOR PLAN 6
hemodynamically stable  c/w lifestyle modifications

## 2019-06-08 NOTE — PROGRESS NOTE ADULT - SUBJECTIVE AND OBJECTIVE BOX
CHIEF COMPLAINT:  mild abdominal pain      MEDICATIONS:  enoxaparin Injectable 40 milliGRAM(s) SubCutaneous every 24 hours        acetaminophen   Tablet .. 650 milliGRAM(s) Oral every 6 hours PRN  LORazepam   Injectable 2 milliGRAM(s) IV Push every 2 hours PRN  ondansetron Injectable 4 milliGRAM(s) IV Push every 6 hours PRN  traMADol 25 milliGRAM(s) Oral two times a day PRN        folic acid 1 milliGRAM(s) Oral daily  multivitamin 1 Tablet(s) Oral daily  thiamine 100 milliGRAM(s) Oral daily          Allergies    No Known Allergies    Intolerances    	        PHYSICAL EXAM:  T(C): 36.8 (06-07-19 @ 14:01), Max: 37.1 (06-06-19 @ 20:49)  HR: 71 (06-07-19 @ 14:01) (70 - 77)  BP: 148/86 (06-07-19 @ 14:01) (128/83 - 148/94)  RR: 18 (06-07-19 @ 14:01) (16 - 18)  SpO2: 100% (06-07-19 @ 14:01) (97% - 100%)  Wt(kg): --  I&O's Summary      Appearance: Normal	  Cardiovascular: Normal S1 S2, No JVD, No murmurs, No edema  Respiratory: Lungs clear to auscultation	  Psychiatry: A & O x 3, Mood & affect appropriate  Gastrointestinal:  Soft, Non-tender, + BS	  Skin: No rashes, No ecchymoses, No cyanosis	  Neurologic: Non-focal  Extremities: Normal range of motion, No clubbing, cyanosis or edema      TELEMETRY: 	  not on tele    	  LABS:	 	Reviewed     CARDIAC MARKERS:                                  13.1   5.11  )-----------( 65       ( 07 Jun 2019 05:44 )             40.5     06-07    138  |  102  |  5<L>  ----------------------------<  84  4.0   |  26  |  0.82    Ca    8.6      07 Jun 2019 05:44    TPro  x   /  Alb  x   /  TBili  x   /  DBili  1.0<H>  /  AST  x   /  ALT  x   /  AlkPhos  x   06-06    proBNP:   Lipid Profile:   HgA1c:   TSH: Thyroid Stimulating Hormone, Serum: 6.00 uIU/mL (06-07 @ 05:44)
[facility]:[id number],   49yMale followed for:  No Known Allergies    PAST MEDICAL & SURGICAL HISTORY:  Dyslipidemia  Hypertension  No significant past surgical history    FAMILY HISTORY:  FH: GI cancer: father  Family history of bacterial pneumonia: mother    MEDICATIONS  (STANDING):  enoxaparin Injectable 40 milliGRAM(s) SubCutaneous every 24 hours  folic acid 1 milliGRAM(s) Oral daily  lactated ringers. 1000 milliLiter(s) (100 mL/Hr) IV Continuous <Continuous>  multivitamin 1 Tablet(s) Oral daily  thiamine Injectable 100 milliGRAM(s) IV Push daily    MEDICATIONS  (PRN):  acetaminophen   Tablet .. 650 milliGRAM(s) Oral every 6 hours PRN Mild Pain (1 - 3)  LORazepam   Injectable 2 milliGRAM(s) IV Push every 2 hours PRN CIWA-Ar score 8 or greater  morphine  - Injectable 2 milliGRAM(s) IV Push every 4 hours PRN Moderate Pain (4 - 6)  morphine  - Injectable 3 milliGRAM(s) IV Push every 6 hours PRN Severe Pain (7 - 10)  ondansetron Injectable 4 milliGRAM(s) IV Push every 6 hours PRN Nausea and/or Vomiting      Vital Signs Last 24 Hrs  T(C): 36.8 (06 Jun 2019 10:00), Max: 37.4 (05 Jun 2019 20:00)  T(F): 98.3 (06 Jun 2019 10:00), Max: 99.4 (05 Jun 2019 20:00)  HR: 77 (06 Jun 2019 10:00) (72 - 87)  BP: 131/76 (06 Jun 2019 10:00) (131/76 - 149/91)  BP(mean): --  RR: 18 (06 Jun 2019 10:00) (16 - 18)  SpO2: 98% (06 Jun 2019 10:00) (98% - 100%)  nc/at  s1s2  cta  soft, nt, nd no guarding or rebound  no c/c/e    CBC Full  -  ( 06 Jun 2019 11:55 )  WBC Count : 6.82 K/uL  RBC Count : 4.07 M/uL  Hemoglobin : 13.7 g/dL  Hematocrit : 41.6 %  Platelet Count - Automated : 60 K/uL  Mean Cell Volume : 102.2 fL  Mean Cell Hemoglobin : 33.7 pg  Mean Cell Hemoglobin Concentration : 32.9 %  Auto Neutrophil # : x  Auto Lymphocyte # : x  Auto Monocyte # : x  Auto Eosinophil # : x  Auto Basophil # : x  Auto Neutrophil % : x  Auto Lymphocyte % : x  Auto Monocyte % : x  Auto Eosinophil % : x  Auto Basophil % : x    06-06    134<L>  |  98  |  4<L>  ----------------------------<  90  3.9   |  24  |  0.76    Ca    8.7      06 Jun 2019 06:19  Phos  Test not performed SPECIMEN GROSSLY HEMOLYZED     06-05  Mg     1.7     06-05    TPro  6.9  /  Alb  3.2<L>  /  TBili  2.7<H>  /  DBili  x   /  AST  96<H>  /  ALT  89<H>  /  AlkPhos  107  06-06    PT/INR - ( 04 Jun 2019 20:55 )   PT: 13.1 SEC;   INR: 1.17          PTT - ( 04 Jun 2019 20:55 )  PTT:32.1 SEC
CHIEF COMPLAINT:  Abdominal pain    HISTORY OF PRESENT ILLNESS:  Patient is a 49-year-old male with a past medical history significant for hypertension, hyperlipidemia, smoking, and EtOH abuse who presents with complaints of abdominal pain.  Patient denies any chest pain, shortness of breath, or palpitations.  Patient also denies any prior cardiac workup.  Patient states that his abdominal pain resolved.  Cardiology consulted for blood pressure management.    PAST MEDICAL & SURGICAL HISTORY:  Dyslipidemia  Hypertension  No significant past surgical history          MEDICATIONS:  enoxaparin Injectable 40 milliGRAM(s) SubCutaneous every 24 hours        acetaminophen   Tablet .. 650 milliGRAM(s) Oral every 6 hours PRN  LORazepam   Injectable 2 milliGRAM(s) IV Push every 2 hours PRN  morphine  - Injectable 2 milliGRAM(s) IV Push every 4 hours PRN  morphine  - Injectable 3 milliGRAM(s) IV Push every 6 hours PRN  ondansetron Injectable 4 milliGRAM(s) IV Push every 6 hours PRN        folic acid 1 milliGRAM(s) Oral daily  lactated ringers. 1000 milliLiter(s) IV Continuous <Continuous>  multivitamin 1 Tablet(s) Oral daily  thiamine Injectable 100 milliGRAM(s) IV Push daily            REVIEW OF SYSTEMS:  CONSTITUTIONAL: No fever, weight loss, or fatigue  EYES: No eye pain, visual disturbances, or discharge  ENMT:  No difficulty hearing, tinnitus, vertigo; No sinus or throat pain  NECK: No pain or stiffness  RESPIRATORY: No cough, wheezing, chills or hemoptysis; No Shortness of Breath  CARDIOVASCULAR: No chest pain, palpitations, passing out, dizziness, or leg swelling  GASTROINTESTINAL: No abdominal or epigastric pain. No nausea, vomiting, or hematemesis; No diarrhea or constipation. No melena or hematochezia.  GENITOURINARY: No dysuria, frequency, hematuria, or incontinence  NEUROLOGICAL: No headaches, memory loss, loss of strength, numbness, or tremors  SKIN: No itching, burning, rashes, or lesions   LYMPH Nodes: No enlarged glands  ENDOCRINE: No heat or cold intolerance; No hair loss  MUSCULOSKELETAL: No joint pain or swelling; No muscle, back, or extremity pain  PSYCHIATRIC: No depression, anxiety, mood swings, or difficulty sleeping  HEME/LYMPH: No easy bruising, or bleeding gums  ALLERY AND IMMUNOLOGIC: No hives or eczema	        PHYSICAL EXAM:  T(C): 36.9 (06-06-19 @ 18:00), Max: 37.4 (06-05-19 @ 20:00)  HR: 70 (06-06-19 @ 18:00) (70 - 84)  BP: 132/75 (06-06-19 @ 18:00) (131/76 - 149/91)  RR: 18 (06-06-19 @ 18:00) (16 - 18)  SpO2: 97% (06-06-19 @ 18:00) (97% - 100%)  Wt(kg): --  I&O's Summary      Appearance: Normal	  HEENT:   Normal oral mucosa, PERRL, EOMI	  Lymphatic: No lymphadenopathy  Cardiovascular: Normal S1 S2, No JVD, No murmurs, No edema  Respiratory: Lungs clear to auscultation	  Psychiatry: A & O x 3, Mood & affect appropriate  Gastrointestinal:  Soft, Non-tender, + BS	  Skin: No rashes, No ecchymoses, No cyanosis	  Neurologic: Non-focal  Extremities: Normal range of motion, No clubbing, cyanosis or edema  Vascular: Peripheral pulses palpable 2+ bilaterally    TELEMETRY: 	  Patient on telemetry  ECG:  	Sinus rhythm with nonspecific ST changes  RADIOLOGY:  OTHER: 	  	  LABS:	 	Reviewed    CARDIAC MARKERS:                                  13.7   6.82  )-----------( 60       ( 06 Jun 2019 11:55 )             41.6     06-06    132<L>  |  95<L>  |  5<L>  ----------------------------<  137<H>  3.6   |  26  |  0.84    Ca    8.7      06 Jun 2019 11:55  Phos  Test not performed SPECIMEN GROSSLY HEMOLYZED     06-05  Mg     1.7     06-05    TPro  x   /  Alb  x   /  TBili  x   /  DBili  1.0<H>  /  AST  x   /  ALT  x   /  AlkPhos  x   06-06    proBNP:   Lipid Profile:   HgA1c:   TSH:
[facility]:[id number],   49yMale followed for:  No Known Allergies    PAST MEDICAL & SURGICAL HISTORY:  Dyslipidemia  Hypertension  No significant past surgical history    FAMILY HISTORY:  FH: GI cancer: father  Family history of bacterial pneumonia: mother    MEDICATIONS  (STANDING):  enoxaparin Injectable 40 milliGRAM(s) SubCutaneous every 24 hours  folic acid 1 milliGRAM(s) Oral daily  lactated ringers. 1000 milliLiter(s) (100 mL/Hr) IV Continuous <Continuous>  multivitamin 1 Tablet(s) Oral daily  thiamine 100 milliGRAM(s) Oral daily    MEDICATIONS  (PRN):  acetaminophen   Tablet .. 650 milliGRAM(s) Oral every 6 hours PRN Mild Pain (1 - 3)  LORazepam   Injectable 2 milliGRAM(s) IV Push every 2 hours PRN CIWA-Ar score 8 or greater  morphine  - Injectable 2 milliGRAM(s) IV Push every 6 hours PRN Severe Pain (7 - 10)  ondansetron Injectable 4 milliGRAM(s) IV Push every 6 hours PRN Nausea and/or Vomiting  traMADol 25 milliGRAM(s) Oral two times a day PRN Moderate Pain (4 - 6)      Vital Signs Last 24 Hrs  T(C): 36.8 (07 Jun 2019 06:00), Max: 37.1 (06 Jun 2019 20:49)  T(F): 98.2 (07 Jun 2019 06:00), Max: 98.7 (06 Jun 2019 20:49)  HR: 77 (07 Jun 2019 06:00) (70 - 77)  BP: 134/84 (07 Jun 2019 06:00) (128/90 - 148/94)  BP(mean): --  RR: 16 (07 Jun 2019 06:00) (16 - 18)  SpO2: 99% (07 Jun 2019 06:00) (97% - 100%)  nc/at  s1s2  cta  soft, nt, nd no guarding or rebound  no c/c/e    CBC Full  -  ( 07 Jun 2019 05:44 )  WBC Count : 5.11 K/uL  RBC Count : 3.88 M/uL  Hemoglobin : 13.1 g/dL  Hematocrit : 40.5 %  Platelet Count - Automated : 65 K/uL  Mean Cell Volume : 104.4 fL  Mean Cell Hemoglobin : 33.8 pg  Mean Cell Hemoglobin Concentration : 32.3 %  Auto Neutrophil # : x  Auto Lymphocyte # : x  Auto Monocyte # : x  Auto Eosinophil # : x  Auto Basophil # : x  Auto Neutrophil % : x  Auto Lymphocyte % : x  Auto Monocyte % : x  Auto Eosinophil % : x  Auto Basophil % : x    06-07    138  |  102  |  5<L>  ----------------------------<  84  4.0   |  26  |  0.82    Ca    8.6      07 Jun 2019 05:44    TPro  x   /  Alb  x   /  TBili  x   /  DBili  1.0<H>  /  AST  x   /  ALT  x   /  AlkPhos  x   06-06
Patient is seen and examined at the bed side, is afebrile. He is doing much better, able to tolerated the regular  diet this morning without any abdominal pain.  The Leukocytosis trended down to normal. The Cultures are negative to date.        REVIEW OF SYSTEMS: All other review systems are negative      ALLERGIES: No Known Allergies      Vital Signs Last 24 Hrs  T(C): 36.8 (2019 06:00), Max: 37.1 (2019 20:49)  T(F): 98.2 (2019 06:00), Max: 98.7 (2019 20:49)  HR: 77 (:00) (70 - 77)  BP: 134/84 (2019 06:00) (128/90 - 148/94)  BP(mean): --  RR: 16 (2019 06:00) (16 - 18)  SpO2: 99% (2019 06:00) (97% - 100%)        PHYSICAL EXAM:  GENERAL: Not in distress   CHEST/LUNG:  Air entry bilaterally  HEART: s1 and s2 present  ABDOMEN: Epigastric tenderness resolved  EXTREMITIES: No pedal  edema  CNS: Awake and Alert        LABS:                        13.1   5.11  )-----------( 65       ( 2019 05:44 )             40.5                           16.8   12.19 )-----------( 138      ( 2019 20:55 )             46.3             138  |  102  |  5<L>  ----------------------------<  84  4.0   |  26  |  0.82    Ca    8.6      2019 05:44    TPro  x   /  Alb  x   /  TBili  x   /  DBili  1.0<H>  /  AST  x   /  ALT  x   /  AlkPhos  x       132<L>  |  100  |  9   ----------------------------<  97  Test not performed SPECIMEN GROSSLY HEMOLYZED   |  18<L>  |  0.74    Ca    8.4      2019 07:25  Phos  Test not performed SPECIMEN GROSSLY HEMOLYZED       Mg     1.7         TPro  Test not performed SPECIMEN GROSSLY HEMOLYZED  /  Alb  3.0<L>  /  TBili  3.1<H>  /  DBili  x   /  AST  224<H>  /  ALT  106<H>  /  AlkPhos  92  06-05    PT/INR - ( 2019 20:55 )   PT: 13.1 SEC;   INR: 1.17     PTT - ( 2019 20:55 )  PTT:32.1 SEC        Urinalysis Basic - ( 2019 20:55 )  Color: BROWN / Appearance: TURBID / S.033 / pH: 7.0  Gluc: NEGATIVE / Ketone: MODERATE  / Bili: TRACE / Urobili: SMALL   Blood: NEGATIVE / Protein: 100 / Nitrite: NEGATIVE   Leuk Esterase: NEGATIVE / RBC: 6-10 / WBC 0-2   Sq Epi: OCC / Non Sq Epi: x / Bacteria: NEGATIVE        MEDICATIONS  (STANDING):  enoxaparin Injectable 40 milliGRAM(s) SubCutaneous every 24 hours  folic acid 1 milliGRAM(s) Oral daily  lactated ringers. 1000 milliLiter(s) (100 mL/Hr) IV Continuous <Continuous>  multivitamin 1 Tablet(s) Oral daily  thiamine 100 milliGRAM(s) Oral daily    MEDICATIONS  (PRN):  acetaminophen   Tablet .. 650 milliGRAM(s) Oral every 6 hours PRN Mild Pain (1 - 3)  LORazepam   Injectable 2 milliGRAM(s) IV Push every 2 hours PRN CIWA-Ar score 8 or greater  morphine  - Injectable 2 milliGRAM(s) IV Push every 6 hours PRN Severe Pain (7 - 10)  ondansetron Injectable 4 milliGRAM(s) IV Push every 6 hours PRN Nausea and/or Vomiting  traMADol 25 milliGRAM(s) Oral two times a day PRN Moderate Pain (4 - 6)        RADIOLOGY & ADDITIONAL TESTS:    19  : CT Abdomen and Pelvis w/ Oral Cont and w/ IV Cont (19 @ 01:56) Mild acute interstitial edematous pancreatitis. No focal collection. No venous thrombosis or pseudoaneurysm. CTSI score = 2. No radiodense gallstones. Enlarged fatty liver.  Likely cystitis. Recommend correlation with urinalysis.      MICROBIOLOGY DATA:    HIV-1/2 Antigen/Antibody Screen by [facility] (19 @ 11:11)    HIV-1/2 Combo Result: Nonreactive The HIV Ag/Ab Combo test performed screens for HIV-1 p24  antigen, antibodies to HIV-1 (group M and group O), and  antibodies to HIV-2. All specimens repeatedly reactive  will reflex to an HIV 1/2 antibody confirmation and  differentiation test. This assay detects p24 antigen which  may be present prior to the development of HIV antibodies,  therefore a reactive result with a negative HIV 1/2 AB  Confirmation should be followed up with HIV-1 RNA, HIV-2  RNA and repeat testing in 4-8 weeks. A nonreactive result  does not preclude previous exposure to or infection with  HIV-1 or HIV-2. [facility] prohibits disclosure of this  result to any unauthorized party.      Culture - Urine (19 @ 22:14)    Culture - Urine:   NO GROWTH AT 24 HOURS    Specimen Source: URINE MIDSTREAM    Culture - Blood (19 @ 21:13)    Culture - Blood:   NO ORGANISMS ISOLATED  NO ORGANISMS ISOLATED AT 48 HRS.    Specimen Source: BLOOD VENOUS      Culture - Blood (19 @ 21:13)    Culture - Blood:   NO ORGANISMS ISOLATED  NO ORGANISMS ISOLATED AT 48 HRS.    Specimen Source: BLOOD PERIPHERAL
Patient seen and examined at bedside  No acute events noted overnight  Case discussed with medical team    HPI:  50 yo male, smoker, with MHx of HTN (?taken off BP medication), HLD, EtOH abuse; Pt states that he drinks 6 pack of beer daily, the last drink yesterday at 10pm. Reports no hx of withdrawals or DTs. The pt c/o Abd pain, 10/10, radiating to the back and subjective fever since today. States that pain started in lower abd with radiation to b/l flank area as well as lower back, pt has had multiple episodes of nbnb emesis since today morning but reports no diarrhea (normal BM last night), Reports no dysuria, urinary urgency or frequency, Abd trauma, urinary retention, HA, CP, SOB, dizziness, hallucinations; (2019 05:41)      PAST MEDICAL & SURGICAL HISTORY:  Dyslipidemia  Hypertension  No significant past surgical history      No Known Allergies       MEDICATIONS  (STANDING):  enoxaparin Injectable 40 milliGRAM(s) SubCutaneous every 24 hours  folic acid 1 milliGRAM(s) Oral daily  multivitamin 1 Tablet(s) Oral daily  thiamine 100 milliGRAM(s) Oral daily    MEDICATIONS  (PRN):  acetaminophen   Tablet .. 650 milliGRAM(s) Oral every 6 hours PRN Mild Pain (1 - 3)  LORazepam   Injectable 2 milliGRAM(s) IV Push every 2 hours PRN CIWA-Ar score 8 or greater  ondansetron Injectable 4 milliGRAM(s) IV Push every 6 hours PRN Nausea and/or Vomiting  traMADol 25 milliGRAM(s) Oral two times a day PRN Moderate Pain (4 - 6)      REVIEW OF SYSTEMS:  CONSTITUTIONAL: (+) malaise.   EYES: No acute change in vision   ENT:  No tinnitus  NECK: No stiffness  RESPIRATORY: No hemoptysis  CARDIOVASCULAR: No chest pain, palpitations, syncope  GASTROINTESTINAL: No hematemesis, diarrhea, melena, or hematochezia.  GENITOURINARY: No hematuria  NEUROLOGICAL: No headaches  LYMPH Nodes: No enlarged glands  ENDOCRINE: No heat or cold intolerance	    T(C): 36.6 (19 @ 06:00), Max: 36.9 (19 @ 18:00)  HR: 62 (19 @ 06:00) (61 - 76)  BP: 147/83 (19 @ 06:00) (126/86 - 148/86)  RR: 18 (19 @ 06:00) (18 - 18)  SpO2: 100% (19 @ 06:00) (98% - 100%)    PHYSICAL EXAMINATION:   Constitutional: WD, NAD  HEENT: NC, AT  Neck:  Supple  Respiratory:  Adequate airflow b/l. Not using accessory muscles of respiration.  Cardiovascular:  S1 & S2 intact, no R/G, 2+ radial pulses b/l  Gastrointestinal: Soft, NT, ND, normoactive b.s., no organomegaly/RT/rigidity  Extremities: WWP  Neurological:  Alert and awake.  No acute focal motor deficits. Crude sensation intact.     Labs and imaging reviewed    LABS:                        12.7   5.11  )-----------( 79       ( 2019 06:15 )             39.6     -    138  |  101  |  6<L>  ----------------------------<  98  4.5   |  27  |  0.79    Ca    9.4      2019 06:15    TPro  x   /  Alb  x   /  TBili  x   /  DBili  1.0<H>  /  AST  x   /  ALT  x   /  AlkPhos  x   -          Urinalysis Basic - ( 2019 18:25 )    Color: ORANGE / Appearance: TURBID / S.016 / pH: 6.5  Gluc: NEGATIVE / Ketone: NEGATIVE  / Bili: TRACE / Urobili: LARGE   Blood: NEGATIVE / Protein: NEGATIVE / Nitrite: NEGATIVE   Leuk Esterase: NEGATIVE / RBC: 0-2 / WBC 3-5   Sq Epi: OCC / Non Sq Epi: x / Bacteria: NEGATIVE      CAPILLARY BLOOD GLUCOSE                  RADIOLOGY & ADDITIONAL STUDIES:
Patient seen and examined at bedside  received iv morphine overnight for abd pain (which is improving overall)  tolerating diet   Case discussed with medical team    HPI:  50 yo male, smoker, with MHx of HTN (?taken off BP medication), HLD, EtOH abuse; Pt states that he drinks 6 pack of beer daily, the last drink yesterday at 10pm. Reports no hx of withdrawals or DTs. The pt c/o Abd pain, 10/10, radiating to the back and subjective fever since today. States that pain started in lower abd with radiation to b/l flank area as well as lower back, pt has had multiple episodes of nbnb emesis since today morning but reports no diarrhea (normal BM last night), Reports no dysuria, urinary urgency or frequency, Abd trauma, urinary retention, HA, CP, SOB, dizziness, hallucinations; (2019 05:41)      PAST MEDICAL & SURGICAL HISTORY:  Dyslipidemia  Hypertension  No significant past surgical history      No Known Allergies       MEDICATIONS  (STANDING):  enoxaparin Injectable 40 milliGRAM(s) SubCutaneous every 24 hours  folic acid 1 milliGRAM(s) Oral daily  lactated ringers. 1000 milliLiter(s) (100 mL/Hr) IV Continuous <Continuous>  multivitamin 1 Tablet(s) Oral daily  thiamine 100 milliGRAM(s) Oral daily    MEDICATIONS  (PRN):  acetaminophen   Tablet .. 650 milliGRAM(s) Oral every 6 hours PRN Mild Pain (1 - 3)  LORazepam   Injectable 2 milliGRAM(s) IV Push every 2 hours PRN CIWA-Ar score 8 or greater  morphine  - Injectable 2 milliGRAM(s) IV Push every 6 hours PRN Severe Pain (7 - 10)  ondansetron Injectable 4 milliGRAM(s) IV Push every 6 hours PRN Nausea and/or Vomiting  traMADol 25 milliGRAM(s) Oral two times a day PRN Moderate Pain (4 - 6)      REVIEW OF SYSTEMS:  CONSTITUTIONAL: (+) malaise.   EYES: No acute change in vision   ENT:  No tinnitus  NECK: No stiffness  RESPIRATORY: No hemoptysis  CARDIOVASCULAR: No chest pain, palpitations, syncope  GASTROINTESTINAL: abd pain. No hematemesis, diarrhea, melena, or hematochezia.  GENITOURINARY: No hematuria  NEUROLOGICAL: No headaches  LYMPH Nodes: No enlarged glands  ENDOCRINE: No heat or cold intolerance	    T(C): 36.8 (19 @ 06:00), Max: 37.1 (19 @ 20:49)  HR: 77 (19 @ 06:00) (70 - 77)  BP: 134/84 (19 @ 06:00) (128/90 - 148/94)  RR: 16 (19 @ 06:00) (16 - 18)  SpO2: 99% (19 @ 06:00) (97% - 100%)    PHYSICAL EXAMINATION:   Constitutional: WD, NAD  HEENT: NC, AT  Neck:  Supple  Respiratory:  Adequate airflow b/l. Not using accessory muscles of respiration.  Cardiovascular:  S1 & S2 intact, no R/G, 2+ radial pulses b/l  Gastrointestinal: mild tenderness to deep palpation. othewise Soft, ND, normoactive b.s., no organomegaly/RT/rigidity  Extremities: WWP  Neurological:  Alert and awake.  No acute focal motor deficits. Crude sensation intact.     Labs and imaging reviewed    LABS:                        13.1   5.11  )-----------( 65       ( 2019 05:44 )             40.5     06-07    138  |  102  |  5<L>  ----------------------------<  84  4.0   |  26  |  0.82    Ca    8.6      2019 05:44    TPro  x   /  Alb  x   /  TBili  x   /  DBili  1.0<H>  /  AST  x   /  ALT  x   /  AlkPhos  x   -06          Urinalysis Basic - ( 2019 18:25 )    Color: ORANGE / Appearance: TURBID / S.016 / pH: 6.5  Gluc: NEGATIVE / Ketone: NEGATIVE  / Bili: TRACE / Urobili: LARGE   Blood: NEGATIVE / Protein: NEGATIVE / Nitrite: NEGATIVE   Leuk Esterase: NEGATIVE / RBC: 0-2 / WBC 3-5   Sq Epi: OCC / Non Sq Epi: x / Bacteria: NEGATIVE      CAPILLARY BLOOD GLUCOSE            LIVER FUNCTIONS - ( 2019 06:19 )  Alb: 3.2 g/dL / Pro: 6.9 g/dL / ALK PHOS: 107 u/L / ALT: 89 u/L / AST: 96 u/L / GGT: x               RADIOLOGY & ADDITIONAL STUDIES:
Pt is seen and examined  pt is awake and lying in bed   No complaints  No bleediing  No abd pain      PAST MEDICAL & SURGICAL HISTORY:  Dyslipidemia  Hypertension  No significant past surgical history      ROS:  Negative except for:    MEDICATIONS  (STANDING):  enoxaparin Injectable 40 milliGRAM(s) SubCutaneous every 24 hours  folic acid 1 milliGRAM(s) Oral daily  multivitamin 1 Tablet(s) Oral daily  thiamine 100 milliGRAM(s) Oral daily    MEDICATIONS  (PRN):  acetaminophen   Tablet .. 650 milliGRAM(s) Oral every 6 hours PRN Mild Pain (1 - 3)  LORazepam   Injectable 2 milliGRAM(s) IV Push every 2 hours PRN CIWA-Ar score 8 or greater  ondansetron Injectable 4 milliGRAM(s) IV Push every 6 hours PRN Nausea and/or Vomiting  traMADol 25 milliGRAM(s) Oral two times a day PRN Moderate Pain (4 - 6)      Allergies    No Known Allergies    Intolerances        Vital Signs Last 24 Hrs  T(C): 36.8 (07 Jun 2019 14:01), Max: 37.1 (06 Jun 2019 20:49)  T(F): 98.3 (07 Jun 2019 14:01), Max: 98.7 (06 Jun 2019 20:49)  HR: 71 (07 Jun 2019 14:01) (70 - 77)  BP: 148/86 (07 Jun 2019 14:01) (128/83 - 148/94)  BP(mean): --  RR: 18 (07 Jun 2019 14:01) (16 - 18)  SpO2: 100% (07 Jun 2019 14:01) (97% - 100%)    PHYSICAL EXAM  General: adult in NAD  HEENT: clear oropharynx, anicteric sclera, pink conjunctiva  Neck: supple  CV: normal S1/S2 with no murmur rubs or gallops  Lungs: positive air movement b/l ant lungs,clear to auscultation, no wheezes, no rales  Abdomen: soft non-tender non-distended, no hepatosplenomegaly  Ext: no clubbing cyanosis or edema  Skin: no rashes and no petechiae  Neuro: alert and oriented X 4, no focal deficits  LABS:                          13.1   5.11  )-----------( 65       ( 07 Jun 2019 05:44 )             40.5     Serial CBC's  06-07 @ 05:44  Hct-40.5 / Hgb-13.1 / Plat-65 / RBC-3.88 / WBC-5.11          Serial CBC's  06-06 @ 11:55  Hct-41.6 / Hgb-13.7 / Plat-60 / RBC-4.07 / WBC-6.82            06-07    138  |  102  |  5<L>  ----------------------------<  84  4.0   |  26  |  0.82    Ca    8.6      07 Jun 2019 05:44    TPro  x   /  Alb  x   /  TBili  x   /  DBili  1.0<H>  /  AST  x   /  ALT  x   /  AlkPhos  x   06-06          Folate, Serum: 16.1 ng/mL (06-07 @ 05:44)  WBC Count: 5.11 K/uL (06-07 @ 05:44)            RADIOLOGY & ADDITIONAL STUDIES:
[facility] NEPHROLOGY- PROGRESS NOTE    49y Male with history of HLD presents with epigastric pain secondary to alcoholic pancreatitis. Nephrology consulted for electrolyte abnormalities.    REVIEW OF SYSTEMS:  Gen: no changes in weight  Cards: no chest pain  Resp: no dyspnea  GI: no nausea or vomiting or diarrhea, + epigastric pain  Vascular: no LE edema    No Known Allergies      Hospital Medications: Medications reviewed    VITALS:  T(F): 98.1 (19 @ 10:00), Max: 98.7 (19 @ 20:49)  HR: 76 (19 @ 10:00)  BP: 128/83 (19 @ 10:00)  RR: 18 (19 @ 10:00)  SpO2: 100% (19 @ 10:00)  Wt(kg): --  Height (cm): 170.18 ( @ 14:01)  Weight (kg): 76.6 ( @ 14:01)  BMI (kg/m2): 26.4 ( @ 14:01)  BSA (m2): 1.88 ( @ 14:01)      PHYSICAL EXAM:    Gen: NAD, calm  Cards: RRR, +S1/S2, no M/G/R  Resp: CTA B/L  GI: soft, + TTP in epigastric region, ND, NABS  Vascular: no LE edema B/L    LABS:      138  |  102  |  5<L>  ----------------------------<  84  4.0   |  26  |  0.82    Ca    8.6      2019 05:44    TPro      /  Alb      /  TBili      /  DBili  1.0<H>  /  AST      /  ALT      /  AlkPhos          Creatinine Trend: 0.82 <--, 0.84 <--, 0.76 <--, 0.74 <--, 0.83 <--                        13.1   5.11  )-----------( 65       ( 2019 05:44 )             40.5     Urine Studies:  Urinalysis Basic - ( 2019 18:25 )    Color: ORANGE / Appearance: TURBID / S.016 / pH: 6.5  Gluc: NEGATIVE / Ketone: NEGATIVE  / Bili: TRACE / Urobili: LARGE   Blood: NEGATIVE / Protein: NEGATIVE / Nitrite: NEGATIVE   Leuk Esterase: NEGATIVE / RBC: 0-2 / WBC 3-5   Sq Epi: OCC / Non Sq Epi:  / Bacteria: NEGATIVE      Protein, Random Urine: 16.3 mg/dL ( @ 18:25)  Creatinine, Random Urine: 226.80 mg/dL ( @ 18:25)
VERONIKA [place] Nawc:[id number],   49yMale followed for:  No Known Allergies    PAST MEDICAL & SURGICAL HISTORY:  Dyslipidemia  Hypertension  No significant past surgical history    FAMILY HISTORY:  FH: GI cancer: father  Family history of bacterial pneumonia: mother    MEDICATIONS  (STANDING):  enoxaparin Injectable 40 milliGRAM(s) SubCutaneous every 24 hours  folic acid 1 milliGRAM(s) Oral daily  multivitamin 1 Tablet(s) Oral daily  thiamine 100 milliGRAM(s) Oral daily    MEDICATIONS  (PRN):  acetaminophen   Tablet .. 650 milliGRAM(s) Oral every 6 hours PRN Mild Pain (1 - 3)  LORazepam   Injectable 2 milliGRAM(s) IV Push every 2 hours PRN CIWA-Ar score 8 or greater  ondansetron Injectable 4 milliGRAM(s) IV Push every 6 hours PRN Nausea and/or Vomiting  traMADol 25 milliGRAM(s) Oral two times a day PRN Moderate Pain (4 - 6)      Vital Signs Last 24 Hrs  T(C): 36.7 (08 Jun 2019 10:00), Max: 36.9 (07 Jun 2019 18:00)  T(F): 98.1 (08 Jun 2019 10:00), Max: 98.5 (07 Jun 2019 18:00)  HR: 60 (08 Jun 2019 10:00) (60 - 73)  BP: 152/90 (08 Jun 2019 10:00) (126/86 - 152/90)  BP(mean): --  RR: 18 (08 Jun 2019 10:00) (18 - 18)  SpO2: 98% (08 Jun 2019 10:00) (98% - 100%)  nc/at  s1s2  cta  soft, nt, nd no guarding or rebound  no c/c/e    CBC Full  -  ( 08 Jun 2019 06:15 )  WBC Count : 5.11 K/uL  RBC Count : 3.74 M/uL  Hemoglobin : 12.7 g/dL  Hematocrit : 39.6 %  Platelet Count - Automated : 79 K/uL  Mean Cell Volume : 105.9 fL  Mean Cell Hemoglobin : 34.0 pg  Mean Cell Hemoglobin Concentration : 32.1 %  Auto Neutrophil # : x  Auto Lymphocyte # : x  Auto Monocyte # : x  Auto Eosinophil # : x  Auto Basophil # : x  Auto Neutrophil % : x  Auto Lymphocyte % : x  Auto Monocyte % : x  Auto Eosinophil % : x  Auto Basophil % : x    06-08    138  |  101  |  6<L>  ----------------------------<  98  4.5   |  27  |  0.79    Ca    9.4      08 Jun 2019 06:15    TPro  x   /  Alb  x   /  TBili  x   /  DBili  1.0<H>  /  AST  x   /  ALT  x   /  AlkPhos  x   06-06
Patient seen and examined at bedside  no new acute events noted overnight  Case discussed with medical team    HPI:  50 yo male, smoker, with MHx of HTN (?taken off BP medication), HLD, EtOH abuse; Pt states that he drinks 6 pack of beer daily, the last drink yesterday at 10pm. Reports no hx of withdrawals or DTs. The pt c/o Abd pain, 10/10, radiating to the back and subjective fever since today. States that pain started in lower abd with radiation to b/l flank area as well as lower back, pt has had multiple episodes of nbnb emesis since today morning but reports no diarrhea (normal BM last night), Reports no dysuria, urinary urgency or frequency, Abd trauma, urinary retention, HA, CP, SOB, dizziness, hallucinations; (2019 05:41)      PAST MEDICAL & SURGICAL HISTORY:  Dyslipidemia  Hypertension  No significant past surgical history      No Known Allergies       MEDICATIONS  (STANDING):  enoxaparin Injectable 40 milliGRAM(s) SubCutaneous every 24 hours  folic acid 1 milliGRAM(s) Oral daily  lactated ringers. 1000 milliLiter(s) (125 mL/Hr) IV Continuous <Continuous>  lactated ringers. 1000 milliLiter(s) (100 mL/Hr) IV Continuous <Continuous>  multivitamin 1 Tablet(s) Oral daily  thiamine Injectable 100 milliGRAM(s) IV Push daily    MEDICATIONS  (PRN):  acetaminophen   Tablet .. 650 milliGRAM(s) Oral every 6 hours PRN Mild Pain (1 - 3)  LORazepam   Injectable 2 milliGRAM(s) IV Push every 2 hours PRN CIWA-Ar score 8 or greater  morphine  - Injectable 2 milliGRAM(s) IV Push every 4 hours PRN Moderate Pain (4 - 6)  morphine  - Injectable 3 milliGRAM(s) IV Push every 6 hours PRN Severe Pain (7 - 10)  ondansetron Injectable 4 milliGRAM(s) IV Push every 6 hours PRN Nausea and/or Vomiting      REVIEW OF SYSTEMS:  CONSTITUTIONAL: (+) malaise.   EYES: No acute change in vision   ENT:  No tinnitus  NECK: No stiffness  RESPIRATORY: No hemoptysis  CARDIOVASCULAR: No chest pain, palpitations, syncope  GASTROINTESTINAL: No hematemesis, diarrhea, melena, or hematochezia.  GENITOURINARY: No hematuria  NEUROLOGICAL: No headaches  LYMPH Nodes: No enlarged glands  ENDOCRINE: No heat or cold intolerance	    T(C): 36.9 (19 @ 06:00), Max: 37.4 (19 @ 20:00)  HR: 73 (19 @ 06:00) (72 - 87)  BP: 136/82 (19 @ 06:00) (133/89 - 149/91)  RR: 16 (19 @ 06:00) (16 - 17)  SpO2: 99% (19 @ 06:00) (98% - 100%)    PHYSICAL EXAMINATION:   Constitutional: WD, NAD  HEENT: NC, AT  Neck:  Supple  Respiratory:  Adequate airflow b/l. Not using accessory muscles of respiration.  Cardiovascular:  S1 & S2 intact, no R/G, 2+ radial pulses b/l  Gastrointestinal: Soft, mild tenderness to deep palpation.  ND, normoactive b.s., no organomegaly/RT/rigidity  Extremities: WWP  Neurological:  Alert and awake.  No acute focal motor deficits. Crude sensation intact.     Labs and imaging reviewed    LABS:                        13.7   7.07  )-----------( 65       ( 2019 06:19 )             41.2     06-    134<L>  |  98  |  4<L>  ----------------------------<  90  3.9   |  24  |  0.76    Ca    8.7      2019 06:19  Phos  Test not performed SPECIMEN GROSSLY HEMOLYZED     06-  Mg     1.7     06-    TPro  6.9  /  Alb  3.2<L>  /  TBili  2.7<H>  /  DBili  x   /  AST  96<H>  /  ALT  89<H>  /  AlkPhos  107  06-06    CARDIAC MARKERS ( 2019 20:55 )  x     / x     / 241 u/L / x     / x          PT/INR - ( 2019 20:55 )   PT: 13.1 SEC;   INR: 1.17          PTT - ( 2019 20:55 )  PTT:32.1 SEC  Urinalysis Basic - ( 2019 20:55 )    Color: BROWN / Appearance: TURBID / S.033 / pH: 7.0  Gluc: NEGATIVE / Ketone: MODERATE  / Bili: TRACE / Urobili: SMALL   Blood: NEGATIVE / Protein: 100 / Nitrite: NEGATIVE   Leuk Esterase: NEGATIVE / RBC: 6-10 / WBC 0-2   Sq Epi: OCC / Non Sq Epi: x / Bacteria: NEGATIVE      CAPILLARY BLOOD GLUCOSE            LIVER FUNCTIONS - ( 2019 06:19 )  Alb: 3.2 g/dL / Pro: 6.9 g/dL / ALK PHOS: 107 u/L / ALT: 89 u/L / AST: 96 u/L / GGT: x               RADIOLOGY & ADDITIONAL STUDIES:

## 2019-06-08 NOTE — PROGRESS NOTE ADULT - PROBLEM SELECTOR PROBLEM 1
Alcohol-induced acute pancreatitis without infection or necrosis
Alcohol-induced acute pancreatitis without infection or necrosis
SIRS (systemic inflammatory response syndrome)

## 2019-06-08 NOTE — PROGRESS NOTE ADULT - PROBLEM SELECTOR PLAN 2
improving  fluid hydration, advance diet as tolerated, analgesics prn, pt educated on alcohol abstinence, improving lytes  d/c planning 6/7/19
no signs of withdrawal today
no signs of withdrawal today  CIWA protocol

## 2019-06-08 NOTE — PROGRESS NOTE ADULT - PROBLEM SELECTOR PLAN 4
Elevated LFTs; Bilirubin elevated, 3.7, with  AST to ALT ratio of 2:1 suggestive of alcoholic hepatitis  US Abd c/w  hepatomegaly and steatosis, and no evidence of cholelithiasis or sonographic evidence of acute cholecystitis.  -Alcohol cessation (abstinence) counseling provided at bedside
2/2 hepatic pathology   outpt f/u
can be 2/2 hepatitis and hepatosteatosis  no signs of bleeding  monitor labs, hem/onc on board

## 2019-06-08 NOTE — PROGRESS NOTE ADULT - PROBLEM SELECTOR PLAN 1
improved, tolerating diet, pt is medically cleared for safe discharge with outpt pcp f/u within 5 days
improving  diet advanced, ideally pt will be off iv analgesics x 24 hrs, fluid hydration  ->Discharge planning for home tmrw 6/8/19
improved, no infection identified, likely reactive fever 2/2 acute pancreatitis, cultures negative to date

## 2019-06-08 NOTE — PROGRESS NOTE ADULT - PROBLEM SELECTOR PLAN 3
MELISSA  Ativan per [facility] protocol  abstinence education provided
Elevated LFTs; Bilirubin elevated, 3.7, with  AST to ALT ratio of 2:1 suggestive of alcoholic hepatitis  US Abd c/w  hepatomegaly and steatosis, and no evidence of cholelithiasis or sonographic evidence of acute cholecystitis.  -Alcohol cessation (abstinence) counseling provided at bedside
lifestyle modifications  outpt f/u

## 2019-06-08 NOTE — DISCHARGE NOTE NURSING/CASE MANAGEMENT/SOCIAL WORK - NSDCDPATPORTLINK_GEN_ALL_CORE
You can access the [facility], offered by [facility], by registering with the following website: [email or web address]

## 2019-06-09 LAB
BACTERIA BLD CULT: SIGNIFICANT CHANGE UP
BACTERIA BLD CULT: SIGNIFICANT CHANGE UP

## 2020-07-20 ENCOUNTER — EMERGENCY (EMERGENCY)
Facility: HOSPITAL | Age: 50
LOS: 1 days | Discharge: HOME | End: 2020-07-20
Attending: EMERGENCY MEDICINE | Admitting: EMERGENCY MEDICINE
Payer: MEDICAID

## 2020-07-20 VITALS
OXYGEN SATURATION: 98 % | DIASTOLIC BLOOD PRESSURE: 77 MMHG | SYSTOLIC BLOOD PRESSURE: 132 MMHG | TEMPERATURE: 98 F | RESPIRATION RATE: 18 BRPM | HEART RATE: 85 BPM

## 2020-07-20 VITALS
HEART RATE: 87 BPM | SYSTOLIC BLOOD PRESSURE: 131 MMHG | TEMPERATURE: 100 F | OXYGEN SATURATION: 100 % | RESPIRATION RATE: 16 BRPM | DIASTOLIC BLOOD PRESSURE: 84 MMHG

## 2020-07-20 LAB
ALBUMIN SERPL ELPH-MCNC: 4.1 G/DL — SIGNIFICANT CHANGE UP (ref 3.3–5)
ALP SERPL-CCNC: 103 U/L — SIGNIFICANT CHANGE UP (ref 40–120)
ALT FLD-CCNC: 118 U/L — HIGH (ref 4–41)
ANION GAP SERPL CALC-SCNC: 12 MMO/L — SIGNIFICANT CHANGE UP (ref 7–14)
AST SERPL-CCNC: 119 U/L — HIGH (ref 4–40)
BILIRUB SERPL-MCNC: 3.2 MG/DL — HIGH (ref 0.2–1.2)
BUN SERPL-MCNC: 8 MG/DL — SIGNIFICANT CHANGE UP (ref 7–23)
CALCIUM SERPL-MCNC: 9.9 MG/DL — SIGNIFICANT CHANGE UP (ref 8.4–10.5)
CHLORIDE SERPL-SCNC: 94 MMOL/L — LOW (ref 98–107)
CO2 SERPL-SCNC: 25 MMOL/L — SIGNIFICANT CHANGE UP (ref 22–31)
CREAT SERPL-MCNC: 0.96 MG/DL — SIGNIFICANT CHANGE UP (ref 0.5–1.3)
GLUCOSE SERPL-MCNC: 118 MG/DL — HIGH (ref 70–99)
HCT VFR BLD CALC: 49.2 % — SIGNIFICANT CHANGE UP (ref 39–50)
HGB BLD-MCNC: 16.3 G/DL — SIGNIFICANT CHANGE UP (ref 13–17)
LIDOCAIN IGE QN: 648 U/L — HIGH (ref 7–60)
MCHC RBC-ENTMCNC: 32.5 PG — SIGNIFICANT CHANGE UP (ref 27–34)
MCHC RBC-ENTMCNC: 33.1 % — SIGNIFICANT CHANGE UP (ref 32–36)
MCV RBC AUTO: 98 FL — SIGNIFICANT CHANGE UP (ref 80–100)
NRBC # FLD: 0 K/UL — SIGNIFICANT CHANGE UP (ref 0–0)
PLATELET # BLD AUTO: 111 K/UL — LOW (ref 150–400)
PMV BLD: 12.3 FL — SIGNIFICANT CHANGE UP (ref 7–13)
POTASSIUM SERPL-MCNC: 5.4 MMOL/L — HIGH (ref 3.5–5.3)
POTASSIUM SERPL-SCNC: 5.4 MMOL/L — HIGH (ref 3.5–5.3)
PROT SERPL-MCNC: 8.3 G/DL — SIGNIFICANT CHANGE UP (ref 6–8.3)
RBC # BLD: 5.02 M/UL — SIGNIFICANT CHANGE UP (ref 4.2–5.8)
RBC # FLD: 11.8 % — SIGNIFICANT CHANGE UP (ref 10.3–14.5)
SODIUM SERPL-SCNC: 131 MMOL/L — LOW (ref 135–145)
WBC # BLD: 11.24 K/UL — HIGH (ref 3.8–10.5)
WBC # FLD AUTO: 11.24 K/UL — HIGH (ref 3.8–10.5)

## 2020-07-20 PROCEDURE — 76705 ECHO EXAM OF ABDOMEN: CPT | Mod: 26

## 2020-07-20 PROCEDURE — 99284 EMERGENCY DEPT VISIT MOD MDM: CPT

## 2020-07-20 RX ORDER — SODIUM CHLORIDE 9 MG/ML
2000 INJECTION INTRAMUSCULAR; INTRAVENOUS; SUBCUTANEOUS ONCE
Refills: 0 | Status: COMPLETED | OUTPATIENT
Start: 2020-07-20 | End: 2020-07-20

## 2020-07-20 RX ORDER — ONDANSETRON 8 MG/1
1 TABLET, FILM COATED ORAL
Qty: 15 | Refills: 0
Start: 2020-07-20

## 2020-07-20 RX ORDER — KETOROLAC TROMETHAMINE 30 MG/ML
15 SYRINGE (ML) INJECTION ONCE
Refills: 0 | Status: DISCONTINUED | OUTPATIENT
Start: 2020-07-20 | End: 2020-07-20

## 2020-07-20 RX ADMIN — SODIUM CHLORIDE 2000 MILLILITER(S): 9 INJECTION INTRAMUSCULAR; INTRAVENOUS; SUBCUTANEOUS at 18:33

## 2020-07-20 RX ADMIN — Medication 30 MILLILITER(S): at 18:33

## 2020-07-20 RX ADMIN — Medication 15 MILLIGRAM(S): at 18:33

## 2020-07-20 NOTE — ED PROVIDER NOTE - PHYSICAL EXAMINATION
Gastrointestinal: Mild epigastric tenderness. Negative Argueta's sign. No CVA tenderness. No lower abd tenderness. No rebound tenderness

## 2020-07-20 NOTE — ED PROVIDER NOTE - CLINICAL SUMMARY MEDICAL DECISION MAKING FREE TEXT BOX
51 y/o M presents with 2 weeks of epigastric pain. Concern for pancreatitis, cholelithiases, peptic ulcer disease. Will gets labs including lipase. Will do pain control and reassess.

## 2020-07-20 NOTE — ED ADULT TRIAGE NOTE - CHIEF COMPLAINT QUOTE
p/t c/o of diffuse abd pain with nausea and vomiting for 2 days, denies any dysuria, sent by PMD for eval,

## 2020-07-20 NOTE — ED ADULT NURSE REASSESSMENT NOTE - NS ED NURSE REASSESS COMMENT FT1
pt resting in results waiting. pt alert and oriented x4. Denies pain or discomforts at this time. Appears comfortable. Pt waiting for reassessment

## 2020-07-20 NOTE — ED ADULT NURSE NOTE - OBJECTIVE STATEMENT
50-year-old male PMHx high cholesterol presents to ED complaining of generalized abdominal pain since Saturday.  Pt states pain has worsened over the past few days and he vomits every time he drinks.  Denies diarrhea, fever, chills.

## 2020-07-20 NOTE — ED PROVIDER NOTE - PATIENT PORTAL LINK FT
You can access the FollowMyHealth Patient Portal offered by [facility] by registering at the following website: [email or web address]. By joining [employer]’s FollowMyHealth portal, you will also be able to view your health information using other applications (apps) compatible with our system.

## 2020-07-20 NOTE — ED PROVIDER NOTE - OBJECTIVE STATEMENT
51 y/o M, PMH of hyperlipidemia, presents with epigastric pain for the past 2 weeks. Pt states that the pain started after excessive alcohol use. Pt is c/o nausea along with the pain. No vomiting, fever, diarrhea, chills, SOB. Pt is also requesting a workup.

## 2020-07-20 NOTE — ED PROVIDER NOTE - NSFOLLOWUPINSTRUCTIONS_ED_ALL_ED_FT
Take pain medication as prescribed.  Return to the Emergency Department if pain worsens despite medication

## 2020-07-21 PROBLEM — E78.5 HYPERLIPIDEMIA, UNSPECIFIED: Chronic | Status: ACTIVE | Noted: 2019-06-04

## 2020-08-30 ENCOUNTER — INPATIENT (INPATIENT)
Facility: HOSPITAL | Age: 50
LOS: 2 days | Discharge: HOME | End: 2020-09-02
Attending: HOSPITALIST | Admitting: HOSPITALIST
Payer: MEDICAID

## 2020-08-30 VITALS
SYSTOLIC BLOOD PRESSURE: 141 MMHG | OXYGEN SATURATION: 98 % | TEMPERATURE: 98 F | HEART RATE: 124 BPM | RESPIRATION RATE: 19 BRPM | DIASTOLIC BLOOD PRESSURE: 101 MMHG

## 2020-08-30 DIAGNOSIS — K85.90 ACUTE PANCREATITIS WITHOUT NECROSIS OR INFECTION, UNSPECIFIED: ICD-10-CM

## 2020-08-30 LAB
ALBUMIN SERPL ELPH-MCNC: 3.4 G/DL — SIGNIFICANT CHANGE UP (ref 3.3–5)
ALBUMIN SERPL ELPH-MCNC: 3.7 G/DL — SIGNIFICANT CHANGE UP (ref 3.3–5)
ALP SERPL-CCNC: 65 U/L — SIGNIFICANT CHANGE UP (ref 40–120)
ALP SERPL-CCNC: 65 U/L — SIGNIFICANT CHANGE UP (ref 40–120)
ALT FLD-CCNC: 134 U/L — HIGH (ref 4–41)
ALT FLD-CCNC: 82 U/L — HIGH (ref 4–41)
ANION GAP SERPL CALC-SCNC: 14 MMO/L — SIGNIFICANT CHANGE UP (ref 7–14)
ANION GAP SERPL CALC-SCNC: 14 MMO/L — SIGNIFICANT CHANGE UP (ref 7–14)
ANION GAP SERPL CALC-SCNC: 21 MMO/L — HIGH (ref 7–14)
APAP SERPL-MCNC: < 15 UG/ML — LOW (ref 15–25)
APPEARANCE UR: CLEAR — SIGNIFICANT CHANGE UP
AST SERPL-CCNC: 121 U/L — HIGH (ref 4–40)
AST SERPL-CCNC: 198 U/L — HIGH (ref 4–40)
BACTERIA # UR AUTO: NEGATIVE — SIGNIFICANT CHANGE UP
BASE EXCESS BLDV CALC-SCNC: -0.7 MMOL/L — SIGNIFICANT CHANGE UP
BASE EXCESS BLDV CALC-SCNC: -8.2 MMOL/L — SIGNIFICANT CHANGE UP
BASE EXCESS BLDV CALC-SCNC: 1.6 MMOL/L — SIGNIFICANT CHANGE UP
BASOPHILS # BLD AUTO: 0.02 K/UL — SIGNIFICANT CHANGE UP (ref 0–0.2)
BASOPHILS # BLD AUTO: 0.04 K/UL — SIGNIFICANT CHANGE UP (ref 0–0.2)
BASOPHILS NFR BLD AUTO: 0.1 % — SIGNIFICANT CHANGE UP (ref 0–2)
BASOPHILS NFR BLD AUTO: 0.3 % — SIGNIFICANT CHANGE UP (ref 0–2)
BILIRUB SERPL-MCNC: 2 MG/DL — HIGH (ref 0.2–1.2)
BILIRUB SERPL-MCNC: 2.7 MG/DL — HIGH (ref 0.2–1.2)
BILIRUB UR-MCNC: NEGATIVE — SIGNIFICANT CHANGE UP
BLOOD GAS VENOUS - CREATININE: 0.58 MG/DL — SIGNIFICANT CHANGE UP (ref 0.5–1.3)
BLOOD GAS VENOUS - CREATININE: 0.9 MG/DL — SIGNIFICANT CHANGE UP (ref 0.5–1.3)
BLOOD GAS VENOUS - CREATININE: 0.95 MG/DL — SIGNIFICANT CHANGE UP (ref 0.5–1.3)
BLOOD GAS VENOUS - FIO2: 21 — SIGNIFICANT CHANGE UP
BLOOD UR QL VISUAL: NEGATIVE — SIGNIFICANT CHANGE UP
BUN SERPL-MCNC: 7 MG/DL — SIGNIFICANT CHANGE UP (ref 7–23)
BUN SERPL-MCNC: 8 MG/DL — SIGNIFICANT CHANGE UP (ref 7–23)
BUN SERPL-MCNC: 9 MG/DL — SIGNIFICANT CHANGE UP (ref 7–23)
CALCIUM SERPL-MCNC: 8.2 MG/DL — LOW (ref 8.4–10.5)
CALCIUM SERPL-MCNC: 8.3 MG/DL — LOW (ref 8.4–10.5)
CALCIUM SERPL-MCNC: 8.5 MG/DL — SIGNIFICANT CHANGE UP (ref 8.4–10.5)
CHLORIDE BLDV-SCNC: 108 MMOL/L — SIGNIFICANT CHANGE UP (ref 96–108)
CHLORIDE BLDV-SCNC: 108 MMOL/L — SIGNIFICANT CHANGE UP (ref 96–108)
CHLORIDE BLDV-SCNC: 122 MMOL/L — HIGH (ref 96–108)
CHLORIDE SERPL-SCNC: 91 MMOL/L — LOW (ref 98–107)
CHLORIDE SERPL-SCNC: 95 MMOL/L — LOW (ref 98–107)
CHLORIDE SERPL-SCNC: 99 MMOL/L — SIGNIFICANT CHANGE UP (ref 98–107)
CO2 SERPL-SCNC: 11 MMOL/L — LOW (ref 22–31)
CO2 SERPL-SCNC: 17 MMOL/L — LOW (ref 22–31)
CO2 SERPL-SCNC: 17 MMOL/L — LOW (ref 22–31)
COLOR SPEC: YELLOW — SIGNIFICANT CHANGE UP
CREAT SERPL-MCNC: 0.78 MG/DL — SIGNIFICANT CHANGE UP (ref 0.5–1.3)
CREAT SERPL-MCNC: 0.85 MG/DL — SIGNIFICANT CHANGE UP (ref 0.5–1.3)
CREAT SERPL-MCNC: 0.91 MG/DL — SIGNIFICANT CHANGE UP (ref 0.5–1.3)
EOSINOPHIL # BLD AUTO: 0.04 K/UL — SIGNIFICANT CHANGE UP (ref 0–0.5)
EOSINOPHIL # BLD AUTO: 0.07 K/UL — SIGNIFICANT CHANGE UP (ref 0–0.5)
EOSINOPHIL NFR BLD AUTO: 0.3 % — SIGNIFICANT CHANGE UP (ref 0–6)
EOSINOPHIL NFR BLD AUTO: 0.5 % — SIGNIFICANT CHANGE UP (ref 0–6)
ETHANOL BLD-MCNC: < 10 MG/DL — SIGNIFICANT CHANGE UP
GAS PNL BLDV: 134 MMOL/L — LOW (ref 136–146)
GAS PNL BLDV: 136 MMOL/L — SIGNIFICANT CHANGE UP (ref 136–146)
GAS PNL BLDV: 138 MMOL/L — SIGNIFICANT CHANGE UP (ref 136–146)
GLUCOSE BLDC GLUCOMTR-MCNC: 102 MG/DL — HIGH (ref 70–99)
GLUCOSE BLDC GLUCOMTR-MCNC: 120 MG/DL — HIGH (ref 70–99)
GLUCOSE BLDC GLUCOMTR-MCNC: 138 MG/DL — HIGH (ref 70–99)
GLUCOSE BLDC GLUCOMTR-MCNC: 162 MG/DL — HIGH (ref 70–99)
GLUCOSE BLDC GLUCOMTR-MCNC: 74 MG/DL — SIGNIFICANT CHANGE UP (ref 70–99)
GLUCOSE BLDC GLUCOMTR-MCNC: 80 MG/DL — SIGNIFICANT CHANGE UP (ref 70–99)
GLUCOSE BLDV-MCNC: 116 MG/DL — HIGH (ref 70–99)
GLUCOSE BLDV-MCNC: 79 MG/DL — SIGNIFICANT CHANGE UP (ref 70–99)
GLUCOSE BLDV-MCNC: 97 MG/DL — SIGNIFICANT CHANGE UP (ref 70–99)
GLUCOSE SERPL-MCNC: 103 MG/DL — HIGH (ref 70–99)
GLUCOSE SERPL-MCNC: 119 MG/DL — HIGH (ref 70–99)
GLUCOSE SERPL-MCNC: 157 MG/DL — HIGH (ref 70–99)
GLUCOSE UR-MCNC: NEGATIVE — SIGNIFICANT CHANGE UP
HCO3 BLDV-SCNC: 19 MMOL/L — LOW (ref 20–27)
HCO3 BLDV-SCNC: 23 MMOL/L — SIGNIFICANT CHANGE UP (ref 20–27)
HCO3 BLDV-SCNC: 24 MMOL/L — SIGNIFICANT CHANGE UP (ref 20–27)
HCT VFR BLD CALC: 44 % — SIGNIFICANT CHANGE UP (ref 39–50)
HCT VFR BLD CALC: 45 % — SIGNIFICANT CHANGE UP (ref 39–50)
HCT VFR BLDV CALC: 35.7 % — LOW (ref 39–51)
HCT VFR BLDV CALC: 50.5 % — SIGNIFICANT CHANGE UP (ref 39–51)
HCT VFR BLDV CALC: 51.6 % — HIGH (ref 39–51)
HGB BLD-MCNC: 15.1 G/DL — SIGNIFICANT CHANGE UP (ref 13–17)
HGB BLD-MCNC: 15.7 G/DL — SIGNIFICANT CHANGE UP (ref 13–17)
HGB BLDV-MCNC: 11.6 G/DL — LOW (ref 13–17)
HGB BLDV-MCNC: 16.5 G/DL — SIGNIFICANT CHANGE UP (ref 13–17)
HGB BLDV-MCNC: 16.9 G/DL — SIGNIFICANT CHANGE UP (ref 13–17)
HYALINE CASTS # UR AUTO: NEGATIVE — SIGNIFICANT CHANGE UP
IMM GRANULOCYTES NFR BLD AUTO: 0.4 % — SIGNIFICANT CHANGE UP (ref 0–1.5)
IMM GRANULOCYTES NFR BLD AUTO: 0.5 % — SIGNIFICANT CHANGE UP (ref 0–1.5)
KETONES UR-MCNC: SIGNIFICANT CHANGE UP
LACTATE BLDV-MCNC: 1.1 MMOL/L — SIGNIFICANT CHANGE UP (ref 0.5–2)
LACTATE BLDV-MCNC: 1.1 MMOL/L — SIGNIFICANT CHANGE UP (ref 0.5–2)
LACTATE BLDV-MCNC: 2.2 MMOL/L — HIGH (ref 0.5–2)
LEUKOCYTE ESTERASE UR-ACNC: NEGATIVE — SIGNIFICANT CHANGE UP
LIDOCAIN IGE QN: 774.8 U/L — HIGH (ref 7–60)
LYMPHOCYTES # BLD AUTO: 1.74 K/UL — SIGNIFICANT CHANGE UP (ref 1–3.3)
LYMPHOCYTES # BLD AUTO: 1.99 K/UL — SIGNIFICANT CHANGE UP (ref 1–3.3)
LYMPHOCYTES # BLD AUTO: 12.9 % — LOW (ref 13–44)
LYMPHOCYTES # BLD AUTO: 14.1 % — SIGNIFICANT CHANGE UP (ref 13–44)
MAGNESIUM SERPL-MCNC: 2 MG/DL — SIGNIFICANT CHANGE UP (ref 1.6–2.6)
MCHC RBC-ENTMCNC: 31 PG — SIGNIFICANT CHANGE UP (ref 27–34)
MCHC RBC-ENTMCNC: 33.6 % — SIGNIFICANT CHANGE UP (ref 32–36)
MCHC RBC-ENTMCNC: 34.7 PG — HIGH (ref 27–34)
MCHC RBC-ENTMCNC: 35.7 % — SIGNIFICANT CHANGE UP (ref 32–36)
MCV RBC AUTO: 92.4 FL — SIGNIFICANT CHANGE UP (ref 80–100)
MCV RBC AUTO: 97.3 FL — SIGNIFICANT CHANGE UP (ref 80–100)
MONOCYTES # BLD AUTO: 0.64 K/UL — SIGNIFICANT CHANGE UP (ref 0–0.9)
MONOCYTES # BLD AUTO: 0.74 K/UL — SIGNIFICANT CHANGE UP (ref 0–0.9)
MONOCYTES NFR BLD AUTO: 4.8 % — SIGNIFICANT CHANGE UP (ref 2–14)
MONOCYTES NFR BLD AUTO: 5.3 % — SIGNIFICANT CHANGE UP (ref 2–14)
NEUTROPHILS # BLD AUTO: 10.93 K/UL — HIGH (ref 1.8–7.4)
NEUTROPHILS # BLD AUTO: 11.2 K/UL — HIGH (ref 1.8–7.4)
NEUTROPHILS NFR BLD AUTO: 79.6 % — HIGH (ref 43–77)
NEUTROPHILS NFR BLD AUTO: 81.2 % — HIGH (ref 43–77)
NITRITE UR-MCNC: NEGATIVE — SIGNIFICANT CHANGE UP
NRBC # FLD: 0 K/UL — SIGNIFICANT CHANGE UP (ref 0–0)
NRBC # FLD: 0 K/UL — SIGNIFICANT CHANGE UP (ref 0–0)
PCO2 BLDV: 22 MMHG — LOW (ref 41–51)
PCO2 BLDV: 45 MMHG — SIGNIFICANT CHANGE UP (ref 41–51)
PCO2 BLDV: 47 MMHG — SIGNIFICANT CHANGE UP (ref 41–51)
PH BLDV: 7.35 PH — SIGNIFICANT CHANGE UP (ref 7.32–7.43)
PH BLDV: 7.37 PH — SIGNIFICANT CHANGE UP (ref 7.32–7.43)
PH BLDV: 7.45 PH — HIGH (ref 7.32–7.43)
PH UR: 7 — SIGNIFICANT CHANGE UP (ref 5–8)
PHOSPHATE SERPL-MCNC: SIGNIFICANT CHANGE UP MG/DL (ref 2.5–4.5)
PLATELET # BLD AUTO: 114 K/UL — LOW (ref 150–400)
PLATELET # BLD AUTO: 151 K/UL — SIGNIFICANT CHANGE UP (ref 150–400)
PMV BLD: 12.3 FL — SIGNIFICANT CHANGE UP (ref 7–13)
PMV BLD: 13 FL — SIGNIFICANT CHANGE UP (ref 7–13)
PO2 BLDV: 39 MMHG — SIGNIFICANT CHANGE UP (ref 35–40)
PO2 BLDV: 44 MMHG — HIGH (ref 35–40)
PO2 BLDV: 50 MMHG — HIGH (ref 35–40)
POTASSIUM BLDV-SCNC: 2.4 MMOL/L — CRITICAL LOW (ref 3.4–4.5)
POTASSIUM BLDV-SCNC: 3.8 MMOL/L — SIGNIFICANT CHANGE UP (ref 3.4–4.5)
POTASSIUM BLDV-SCNC: 4.4 MMOL/L — SIGNIFICANT CHANGE UP (ref 3.4–4.5)
POTASSIUM SERPL-MCNC: SIGNIFICANT CHANGE UP MMOL/L (ref 3.5–5.3)
POTASSIUM SERPL-SCNC: SIGNIFICANT CHANGE UP MMOL/L (ref 3.5–5.3)
PROT SERPL-MCNC: SIGNIFICANT CHANGE UP G/DL (ref 6–8.3)
PROT SERPL-MCNC: SIGNIFICANT CHANGE UP G/DL (ref 6–8.3)
PROT UR-MCNC: 30 — SIGNIFICANT CHANGE UP
RBC # BLD: 4.52 M/UL — SIGNIFICANT CHANGE UP (ref 4.2–5.8)
RBC # BLD: 4.87 M/UL — SIGNIFICANT CHANGE UP (ref 4.2–5.8)
RBC # FLD: 12.1 % — SIGNIFICANT CHANGE UP (ref 10.3–14.5)
RBC # FLD: 12.4 % — SIGNIFICANT CHANGE UP (ref 10.3–14.5)
RBC CASTS # UR COMP ASSIST: SIGNIFICANT CHANGE UP (ref 0–?)
SALICYLATES SERPL-MCNC: < 5 MG/DL — LOW (ref 15–30)
SAO2 % BLDV: 66.3 % — SIGNIFICANT CHANGE UP (ref 60–85)
SAO2 % BLDV: 76.4 % — SIGNIFICANT CHANGE UP (ref 60–85)
SAO2 % BLDV: 85.5 % — HIGH (ref 60–85)
SARS-COV-2 RNA SPEC QL NAA+PROBE: SIGNIFICANT CHANGE UP
SODIUM SERPL-SCNC: 122 MMOL/L — LOW (ref 135–145)
SODIUM SERPL-SCNC: 127 MMOL/L — LOW (ref 135–145)
SODIUM SERPL-SCNC: 130 MMOL/L — LOW (ref 135–145)
SP GR SPEC: > 1.04 — HIGH (ref 1–1.04)
SQUAMOUS # UR AUTO: SIGNIFICANT CHANGE UP
TRIGL SERPL-MCNC: 2334 MG/DL — HIGH (ref 10–149)
TRIGL SERPL-MCNC: 3801 MG/DL — HIGH (ref 10–149)
UROBILINOGEN FLD QL: NORMAL — SIGNIFICANT CHANGE UP
WBC # BLD: 13.47 K/UL — HIGH (ref 3.8–10.5)
WBC # BLD: 14.07 K/UL — HIGH (ref 3.8–10.5)
WBC # FLD AUTO: 13.47 K/UL — HIGH (ref 3.8–10.5)
WBC # FLD AUTO: 14.07 K/UL — HIGH (ref 3.8–10.5)
WBC UR QL: SIGNIFICANT CHANGE UP (ref 0–?)

## 2020-08-30 PROCEDURE — 99285 EMERGENCY DEPT VISIT HI MDM: CPT

## 2020-08-30 PROCEDURE — 74177 CT ABD & PELVIS W/CONTRAST: CPT | Mod: 26

## 2020-08-30 PROCEDURE — 71045 X-RAY EXAM CHEST 1 VIEW: CPT | Mod: 26

## 2020-08-30 PROCEDURE — 99291 CRITICAL CARE FIRST HOUR: CPT | Mod: 25

## 2020-08-30 RX ORDER — INSULIN HUMAN 100 [IU]/ML
8 INJECTION, SOLUTION SUBCUTANEOUS
Qty: 100 | Refills: 0 | Status: DISCONTINUED | OUTPATIENT
Start: 2020-08-30 | End: 2020-08-31

## 2020-08-30 RX ORDER — CHLORHEXIDINE GLUCONATE 213 G/1000ML
1 SOLUTION TOPICAL
Refills: 0 | Status: DISCONTINUED | OUTPATIENT
Start: 2020-08-30 | End: 2020-09-01

## 2020-08-30 RX ORDER — SODIUM CHLORIDE 9 MG/ML
1000 INJECTION, SOLUTION INTRAVENOUS
Refills: 0 | Status: DISCONTINUED | OUTPATIENT
Start: 2020-08-30 | End: 2020-08-30

## 2020-08-30 RX ORDER — ONDANSETRON 8 MG/1
4 TABLET, FILM COATED ORAL ONCE
Refills: 0 | Status: COMPLETED | OUTPATIENT
Start: 2020-08-30 | End: 2020-08-30

## 2020-08-30 RX ORDER — POTASSIUM CHLORIDE 20 MEQ
10 PACKET (EA) ORAL
Refills: 0 | Status: COMPLETED | OUTPATIENT
Start: 2020-08-30 | End: 2020-08-30

## 2020-08-30 RX ORDER — POTASSIUM CHLORIDE 20 MEQ
40 PACKET (EA) ORAL ONCE
Refills: 0 | Status: COMPLETED | OUTPATIENT
Start: 2020-08-30 | End: 2020-08-30

## 2020-08-30 RX ORDER — MORPHINE SULFATE 50 MG/1
4 CAPSULE, EXTENDED RELEASE ORAL ONCE
Refills: 0 | Status: DISCONTINUED | OUTPATIENT
Start: 2020-08-30 | End: 2020-08-30

## 2020-08-30 RX ORDER — SIMVASTATIN 20 MG/1
20 TABLET, FILM COATED ORAL AT BEDTIME
Refills: 0 | Status: DISCONTINUED | OUTPATIENT
Start: 2020-08-30 | End: 2020-08-31

## 2020-08-30 RX ORDER — HYDROMORPHONE HYDROCHLORIDE 2 MG/ML
1 INJECTION INTRAMUSCULAR; INTRAVENOUS; SUBCUTANEOUS EVERY 4 HOURS
Refills: 0 | Status: DISCONTINUED | OUTPATIENT
Start: 2020-08-30 | End: 2020-09-01

## 2020-08-30 RX ORDER — SODIUM CHLORIDE 9 MG/ML
1000 INJECTION INTRAMUSCULAR; INTRAVENOUS; SUBCUTANEOUS ONCE
Refills: 0 | Status: COMPLETED | OUTPATIENT
Start: 2020-08-30 | End: 2020-08-30

## 2020-08-30 RX ORDER — INSULIN HUMAN 100 [IU]/ML
7 INJECTION, SOLUTION SUBCUTANEOUS
Qty: 100 | Refills: 0 | Status: DISCONTINUED | OUTPATIENT
Start: 2020-08-30 | End: 2020-08-30

## 2020-08-30 RX ORDER — DEXTROSE MONOHYDRATE, SODIUM CHLORIDE, AND POTASSIUM CHLORIDE 50; .745; 4.5 G/1000ML; G/1000ML; G/1000ML
1000 INJECTION, SOLUTION INTRAVENOUS
Refills: 0 | Status: DISCONTINUED | OUTPATIENT
Start: 2020-08-30 | End: 2020-08-30

## 2020-08-30 RX ORDER — SODIUM CHLORIDE 9 MG/ML
1000 INJECTION, SOLUTION INTRAVENOUS
Refills: 0 | Status: DISCONTINUED | OUTPATIENT
Start: 2020-08-30 | End: 2020-08-31

## 2020-08-30 RX ORDER — ENOXAPARIN SODIUM 100 MG/ML
40 INJECTION SUBCUTANEOUS DAILY
Refills: 0 | Status: DISCONTINUED | OUTPATIENT
Start: 2020-08-30 | End: 2020-09-02

## 2020-08-30 RX ADMIN — SODIUM CHLORIDE 100 MILLILITER(S): 9 INJECTION, SOLUTION INTRAVENOUS at 14:31

## 2020-08-30 RX ADMIN — MORPHINE SULFATE 4 MILLIGRAM(S): 50 CAPSULE, EXTENDED RELEASE ORAL at 18:30

## 2020-08-30 RX ADMIN — SODIUM CHLORIDE 1000 MILLILITER(S): 9 INJECTION INTRAMUSCULAR; INTRAVENOUS; SUBCUTANEOUS at 12:15

## 2020-08-30 RX ADMIN — HYDROMORPHONE HYDROCHLORIDE 1 MILLIGRAM(S): 2 INJECTION INTRAMUSCULAR; INTRAVENOUS; SUBCUTANEOUS at 21:03

## 2020-08-30 RX ADMIN — Medication 100 MILLIEQUIVALENT(S): at 17:27

## 2020-08-30 RX ADMIN — MORPHINE SULFATE 4 MILLIGRAM(S): 50 CAPSULE, EXTENDED RELEASE ORAL at 14:00

## 2020-08-30 RX ADMIN — SODIUM CHLORIDE 1000 MILLILITER(S): 9 INJECTION INTRAMUSCULAR; INTRAVENOUS; SUBCUTANEOUS at 13:30

## 2020-08-30 RX ADMIN — INSULIN HUMAN 7 UNIT(S)/HR: 100 INJECTION, SOLUTION SUBCUTANEOUS at 14:32

## 2020-08-30 RX ADMIN — INSULIN HUMAN 7 UNIT(S)/HR: 100 INJECTION, SOLUTION SUBCUTANEOUS at 21:02

## 2020-08-30 RX ADMIN — ONDANSETRON 4 MILLIGRAM(S): 8 TABLET, FILM COATED ORAL at 12:15

## 2020-08-30 RX ADMIN — MORPHINE SULFATE 4 MILLIGRAM(S): 50 CAPSULE, EXTENDED RELEASE ORAL at 18:07

## 2020-08-30 RX ADMIN — SODIUM CHLORIDE 1000 MILLILITER(S): 9 INJECTION, SOLUTION INTRAVENOUS at 15:51

## 2020-08-30 RX ADMIN — DEXTROSE MONOHYDRATE, SODIUM CHLORIDE, AND POTASSIUM CHLORIDE 150 MILLILITER(S): 50; .745; 4.5 INJECTION, SOLUTION INTRAVENOUS at 19:32

## 2020-08-30 RX ADMIN — DEXTROSE MONOHYDRATE, SODIUM CHLORIDE, AND POTASSIUM CHLORIDE 200 MILLILITER(S): 50; .745; 4.5 INJECTION, SOLUTION INTRAVENOUS at 21:02

## 2020-08-30 RX ADMIN — Medication 100 MILLIEQUIVALENT(S): at 16:24

## 2020-08-30 RX ADMIN — HYDROMORPHONE HYDROCHLORIDE 1 MILLIGRAM(S): 2 INJECTION INTRAMUSCULAR; INTRAVENOUS; SUBCUTANEOUS at 21:27

## 2020-08-30 RX ADMIN — MORPHINE SULFATE 4 MILLIGRAM(S): 50 CAPSULE, EXTENDED RELEASE ORAL at 13:00

## 2020-08-30 RX ADMIN — Medication 100 MILLIEQUIVALENT(S): at 17:40

## 2020-08-30 RX ADMIN — MORPHINE SULFATE 4 MILLIGRAM(S): 50 CAPSULE, EXTENDED RELEASE ORAL at 12:15

## 2020-08-30 RX ADMIN — INSULIN HUMAN 7 UNIT(S)/HR: 100 INJECTION, SOLUTION SUBCUTANEOUS at 15:49

## 2020-08-30 RX ADMIN — ENOXAPARIN SODIUM 40 MILLIGRAM(S): 100 INJECTION SUBCUTANEOUS at 18:09

## 2020-08-30 RX ADMIN — MORPHINE SULFATE 4 MILLIGRAM(S): 50 CAPSULE, EXTENDED RELEASE ORAL at 13:39

## 2020-08-30 RX ADMIN — Medication 40 MILLIEQUIVALENT(S): at 15:57

## 2020-08-30 RX ADMIN — SIMVASTATIN 20 MILLIGRAM(S): 20 TABLET, FILM COATED ORAL at 23:17

## 2020-08-30 NOTE — ED ADULT NURSE REASSESSMENT NOTE - NS ED NURSE REASSESS COMMENT FT1
pt left ED AOX4 in no apparent distress VSS Qhr FS completed prior to transport, no changes made to insulin drip as per nomogram denies pain. Transported with float RN and EDT on zoll, conformed clean bed prior to transport to [facility] RN

## 2020-08-30 NOTE — ED PROVIDER NOTE - SEVERE SEPSIS ALERT DETAILS
Pt w/ pancreatitis 2/2 hypertrigliceridemia. No fever or leading symptoms to support a diagnosis of sepsis.

## 2020-08-30 NOTE — H&P ADULT - ATTENDING COMMENTS
50 M with hld here with abdominal pain, n/v found to have acute pancreatitis due to hypertriglyceridemia, HAGMA, hyponatremia, hypokalemia.  Will replete hypokalemia before starting insulin gtt, will need D5 to increase sugars in order to give insulin.  Will give IVF.  Monitor off abx for now, f/u cultures.  Patient is comfortable appearing.    This patient is critically ill and required frequent bedside visits with therapy change. I have personally provided 35 minutes of critical care time concurrently with the resident/fellow. This time excludes time spent on separate procedures and time spent teaching. I have reviewed the resident/fellow’s documentation and I agree with the assessment and plan of care.

## 2020-08-30 NOTE — ED ADULT NURSE REASSESSMENT NOTE - NS ED NURSE REASSESS COMMENT FT1
received report from ZACH CAMPOS pt aox4 in no apparent distress VSS fluids running as per order endorses pain beginning to return to abdomen, ICU team pages awaiting call back regarding pain medication will continue to monitor

## 2020-08-30 NOTE — ED PROVIDER NOTE - OBJECTIVE STATEMENT
50M hx hld, daily smoker, pancreatitis, p/f abdominal pain since yesterday, worsening, 8/10 intensity, sharp, localized to lower abdomen, favors R side, does not radiate, assoc w/ nausea and vomiting (nonbloody). pt says he also doesn't have urge to eat or drink since it started and that drinking a beer / eating food makes it worse.

## 2020-08-30 NOTE — ED PROVIDER NOTE - ATTENDING CONTRIBUTION TO CARE
49yo M ho etoh abuse, says he only drinks lightly now, has epigastric pain and vomiting since last night after drinking 1 beer, and having cookies and milk, no fevers, no chills, no diarrhea, no urinary sx, no abd surgeries  on exam, epigastrium ttp, L upper and R upper quadrant tenderness  will eval for pancreatitis vs other intraabdominal path  labs, fluids, pain control, lipase, xr, CT   reassess

## 2020-08-30 NOTE — ED PROVIDER NOTE - CLINICAL SUMMARY MEDICAL DECISION MAKING FREE TEXT BOX
50M etoh drinking p/f abdominal pain, +TTP in epigastrim and RLQ, , labs, ct, urine, lactate, ctap. C/f repeat pancreatitis vs appendicitis vs PUD / gastritis. Will trial GI cocktail for symptom mgmt.

## 2020-08-30 NOTE — H&P ADULT - HISTORY OF PRESENT ILLNESS
49 yo M with hx of HLD presenting with abdominal pain, N/V found to have pancreatitis on CT scan.     Patient reports one episode of pancreatitis in the past with similar symptoms. He has worsening epigastric abdominal pain over the last 2 days with nausea and multiple episodes of vomiting. 49 yo M with hx of HLD presenting with abdominal pain, N/V found to have pancreatitis 2/2 hypertriglyceridemia.     Patient reports one episode of pancreatitis in the past with similar symptoms. He has worsening epigastric abdominal pain over the last 2 days with nausea and multiple episodes of vomiting. Patient initially placed on insulin drip but stopped due to hypokalemia on initial VBG. Insulin drip restarted after repeat K+.

## 2020-08-30 NOTE — ED ADULT NURSE REASSESSMENT NOTE - NS ED NURSE REASSESS COMMENT FT1
Pt received from ZACH Marcial. Pt currently resting in bed, c/o 3/10 abdominal pain, does not want pain management modalities at this time. IV gtt running as ordered. Plan for ICU admission. Pt on cardiac monitoring. Will continue to monitor.

## 2020-08-30 NOTE — H&P ADULT - NSHPPHYSICALEXAM_GEN_ALL_CORE
PHYSICAL EXAM:  General:   HEENT:   Neck:   Chest/Lungs:  Heart:  Abdomen:   Extremities:   Skin:   Neuro:   Psych: General: Patient is well appearing and in no apparent distress, AAO x 3.  Skin: Dry and intact.  HEENT: Head atraumatic. Oral mucosa moist  Eyes: Conjunctiva normal.  Cardiac: Regular rhythm and rate. No pretibial edema b/l.  Respiratory: No respiratory distress, speaking in full sentences. Lungs clear b/l and symmetric.  Gastrointestinal: Abdomen tense with TTP in the epigastrium with guarding. Abdomen nondistended.   Musculoskeletal: Moves all extremities spontaneously.  Neurological: AAOx3.  Psychiatric: Cooperative with exam.

## 2020-08-30 NOTE — H&P ADULT - ASSESSMENT
ASSESSMENT AND PLAN:      #Neuro    #Cardiovascular    #Respiratory    #GI/Nutrition    #/Renal    #Skin    #ID    #Endocrine    #Hematologic/DVT ppx    #Ethics      Siobhan Moreira DO  EM PGY1  Pager: [phone] ASSESSMENT AND PLAN:      #Neuro  - No active issues  - Not currently exhibiting s/s of EtOH withdrawal, last drink 3 days ago (1 beer, 1 cocktail)  - Will continue to monitor for possible withdrawal    #Cardiovascular  - Hypertensive on presentation likely 2/2 pain  - Will control pain and continue to monitor for future need for HTN medications    #Respiratory  - No acute issues  - SaO2 100% on room air    #GI/Nutrition  - Elevated TGs at presentation  - Will continue IVF LR D5 150-200cc/hr with K+ repletion  - Keep NPO for now   - Insulin gtt held for K+ level    #/Renal  - Monitor I/Os   - No EVITA but patient has progression of acidosis, will monitor   - Trend VBGs    #ID  - Leukocytosis likely reactive 2/2 pancreatitis    #Endocrine  - C/w FS q1  - Endo consult tomorrow    #Hematologic/DVT ppx  - Lovenox ppx    #Ethics  - Plan to discuss code status tomorrow    Siobhan Moreira DO  EM PGY1  Pager: [phone] ASSESSMENT AND PLAN:      #Neuro  - No active issues  - Not currently exhibiting s/s of EtOH withdrawal, last drink 3 days ago (1 beer, 1 cocktail)  - Will continue to monitor for possible withdrawal    #Cardiovascular  - Hypertensive on presentation likely 2/2 pain  - Will control pain and continue to monitor for future need for HTN medications  - Will continue home med simvastatin 20 mg    #Respiratory  - No acute issues  - SaO2 100% on room air    #GI/Nutrition  - Elevated TGs at presentation  - Will continue IVF LR D5 150-200cc/hr with K+ repletion  - Keep NPO for now   - Insulin gtt held for K+ level    #/Renal  - Monitor I/Os   - No EVITA but patient has progression of acidosis, will monitor   - Trend VBGs    #ID  - Leukocytosis likely reactive 2/2 pancreatitis    #Endocrine  - C/w FS q1  - Endo consult tomorrow    #Hematologic/DVT ppx  - Lovenox ppx    #Ethics  - Plan to discuss code status tomorrow    Siobhan Moreira DO  EM PGY1  Pager: [phone] ASSESSMENT AND PLAN:  49 yo M with hx of pancreatitis presenting with new episode of pancreatitis 2/2 hypertriglyceridemia.     #Neuro  - No active issues  - Not currently exhibiting s/s of EtOH withdrawal, last drink 3 days ago (1 beer, 1 cocktail)  - Will continue to monitor for possible withdrawal    #Cardiovascular  - Hypertensive on presentation likely 2/2 pain  - Will control pain and continue to monitor for future need for HTN medications  - Will continue home med simvastatin 20 mg    #Respiratory  - No acute issues  - SaO2 100% on room air    #GI/Nutrition  - Elevated TGs at presentation  - Will continue IVF LR D5 150-200cc/hr with K+ repletion  - Keep NPO for now   - Insulin gtt held for K+ level    #/Renal  - Monitor I/Os   - No EVITA but patient has progression of acidosis, will monitor   - Trend VBGs    #ID  - Leukocytosis likely reactive 2/2 pancreatitis    #Endocrine  - C/w FS q1  - Endo consult tomorrow    #Hematologic/DVT ppx  - Lovenox ppx    #Ethics  - Plan to discuss code status tomorrow    Siobhan Moreira DO  EM PGY1  Pager: [phone]

## 2020-08-30 NOTE — ED ADULT NURSE REASSESSMENT NOTE - NS ED NURSE REASSESS COMMENT FT1
Pt currently resting in bed, denies complaints at this time. MICU contacted for patient report, unable to give report to unit, charge RN aware. IV fluids running as per orders. Lab work sent. Pt on cardiac monitoring. Will continue to monitor.

## 2020-08-30 NOTE — H&P ADULT - NSHPSOCIALHISTORY_GEN_ALL_CORE
Smokin/2 pack per day smoke  Alcohol: Everyday drinker. 1-2 beers/day  Recreational drugs: denies Smokin/2 pack per day smoker  Alcohol: Everyday drinker. 1-2 beers/day  Recreational drugs: denies

## 2020-08-30 NOTE — H&P ADULT - NSHPREVIEWOFSYSTEMS_GEN_ALL_CORE
REVIEW OF SYSTEMS:  Constitutional: No fevers, chills, weight loss, weight gain  HEENT: No vision problems, eye pain, nasal congestion, rhinorrhea, sore throat, dysphagia  CV: No chest pain, orthopnea, palpitations  Resp: No cough, dyspnea, wheezing, hemoptysis  GI: No nausea, vomiting, diarrhea, constipation, abdominal pain  : [ ] dysuria [ ] nocturia [ ] hematuria [ ] increased urinary frequency  Musculoskeletal: [ ] back pain [ ] myalgias [ ] arthralgias [ ] fracture  Skin: [ ] rash [ ] itch  Neurological: [ ] headache [ ] dizziness [ ] syncope [ ] weakness [ ] numbness  Psychiatric: [ ] anxiety [ ] depression  Endocrine: [ ] diabetes [ ] thyroid problem  Hematologic/Lymphatic: [ ] anemia [ ] bleeding problem  Allergic/Immunologic: [ ] itchy eyes [ ] nasal discharge [ ] hives [ ] angioedema  [ ] All other systems negative  [ ] Unable to assess ROS because ________ CONSTITUTIONAL: No weakness, fevers or chills  EYES/ENT: No visual changes;  No vertigo or throat pain   NECK: No pain or stiffness  RESPIRATORY: No cough, wheezing, hemoptysis; No shortness of breath  CARDIOVASCULAR: No chest pain or palpitations  GASTROINTESTINAL: +N/V, epigatric pain. No diarrhea or constipation. No melena or hematochezia.  GENITOURINARY: +increased urinary frequency. No dysuria or hematuria.  NEUROLOGICAL: No numbness or weakness.  SKIN: No itching, burning, rashes, or lesions .  All other review of systems is negative unless indicated above.

## 2020-08-30 NOTE — H&P ADULT - NSHPLABSRESULTS_GEN_ALL_CORE
LABS:                        15.1   14.07 )-----------( 151      ( 30 Aug 2020 11:52 )             45.0     Hgb Trend: 15.1<--  08-30    127<L>  |  95<L>  |  8   ----------------------------<  119<H>  Test not performed SPECIMEN GROSSLY HEMOLYZED   |  11<L>  |  0.91    Ca    8.2<L>      30 Aug 2020 13:36    TPro  Test not performed SPECIMEN GROSSLY HEMOLYZED  /  Alb  3.7  /  TBili  2.7<H>  /  DBili  x   /  AST  198<H>  /  ALT  134<H>  /  AlkPhos  65  08-30    Creatinine Trend: 0.91<--, 0.85<--        Venous Blood Gas:  08-30 @ 11:52  7.45/22/50/19/85.5  VBG Lactate: 1.1      MICROBIOLOGY:     RADIOLOGY & ADDITIONAL TESTS: LABS:                        15.1   14.07 )-----------( 151      ( 30 Aug 2020 11:52 )             45.0     Hgb Trend: 15.1<--  08-30    127<L>  |  95<L>  |  8   ----------------------------<  119<H>  Test not performed SPECIMEN GROSSLY HEMOLYZED   |  11<L>  |  0.91    Ca    8.2<L>      30 Aug 2020 13:36    TPro  Test not performed SPECIMEN GROSSLY HEMOLYZED  /  Alb  3.7  /  TBili  2.7<H>  /  DBili  x   /  AST  198<H>  /  ALT  134<H>  /  AlkPhos  65  08-30    Creatinine Trend: 0.91<--, 0.85<--        Venous Blood Gas:  08-30 @ 11:52  7.45/22/50/19/85.5  VBG Lactate: 1.1    RADIOLOGY & ADDITIONAL TESTS:  CT abdomen impression: Acute interstitial edematous pancreatitis. No focal collection.

## 2020-08-31 LAB
ALBUMIN SERPL ELPH-MCNC: 3.1 G/DL — LOW (ref 3.3–5)
ALBUMIN SERPL ELPH-MCNC: 3.1 G/DL — LOW (ref 3.3–5)
ALBUMIN SERPL ELPH-MCNC: 3.2 G/DL — LOW (ref 3.3–5)
ALP SERPL-CCNC: 66 U/L — SIGNIFICANT CHANGE UP (ref 40–120)
ALP SERPL-CCNC: 67 U/L — SIGNIFICANT CHANGE UP (ref 40–120)
ALP SERPL-CCNC: 70 U/L — SIGNIFICANT CHANGE UP (ref 40–120)
ALT FLD-CCNC: 52 U/L — HIGH (ref 4–41)
ALT FLD-CCNC: 64 U/L — HIGH (ref 4–41)
ALT FLD-CCNC: 70 U/L — HIGH (ref 4–41)
ANION GAP SERPL CALC-SCNC: 10 MMO/L — SIGNIFICANT CHANGE UP (ref 7–14)
ANION GAP SERPL CALC-SCNC: 11 MMO/L — SIGNIFICANT CHANGE UP (ref 7–14)
ANION GAP SERPL CALC-SCNC: 12 MMO/L — SIGNIFICANT CHANGE UP (ref 7–14)
AST SERPL-CCNC: 35 U/L — SIGNIFICANT CHANGE UP (ref 4–40)
AST SERPL-CCNC: 60 U/L — HIGH (ref 4–40)
AST SERPL-CCNC: 64 U/L — HIGH (ref 4–40)
BASE EXCESS BLDV CALC-SCNC: 0.1 MMOL/L — SIGNIFICANT CHANGE UP
BASOPHILS # BLD AUTO: 0.01 K/UL — SIGNIFICANT CHANGE UP (ref 0–0.2)
BASOPHILS # BLD AUTO: 0.01 K/UL — SIGNIFICANT CHANGE UP (ref 0–0.2)
BASOPHILS # BLD AUTO: 0.02 K/UL — SIGNIFICANT CHANGE UP (ref 0–0.2)
BASOPHILS NFR BLD AUTO: 0.1 % — SIGNIFICANT CHANGE UP (ref 0–2)
BILIRUB SERPL-MCNC: 1.3 MG/DL — HIGH (ref 0.2–1.2)
BILIRUB SERPL-MCNC: 1.5 MG/DL — HIGH (ref 0.2–1.2)
BILIRUB SERPL-MCNC: 1.9 MG/DL — HIGH (ref 0.2–1.2)
BLOOD GAS VENOUS - CREATININE: 0.9 MG/DL — SIGNIFICANT CHANGE UP (ref 0.5–1.3)
BLOOD GAS VENOUS - FIO2: 21 — SIGNIFICANT CHANGE UP
BUN SERPL-MCNC: 4 MG/DL — LOW (ref 7–23)
BUN SERPL-MCNC: 4 MG/DL — LOW (ref 7–23)
BUN SERPL-MCNC: 6 MG/DL — LOW (ref 7–23)
CALCIUM SERPL-MCNC: 8.2 MG/DL — LOW (ref 8.4–10.5)
CALCIUM SERPL-MCNC: 8.4 MG/DL — SIGNIFICANT CHANGE UP (ref 8.4–10.5)
CALCIUM SERPL-MCNC: 8.8 MG/DL — SIGNIFICANT CHANGE UP (ref 8.4–10.5)
CHLORIDE BLDV-SCNC: 106 MMOL/L — SIGNIFICANT CHANGE UP (ref 96–108)
CHLORIDE SERPL-SCNC: 100 MMOL/L — SIGNIFICANT CHANGE UP (ref 98–107)
CHLORIDE SERPL-SCNC: 101 MMOL/L — SIGNIFICANT CHANGE UP (ref 98–107)
CHLORIDE SERPL-SCNC: 97 MMOL/L — LOW (ref 98–107)
CO2 SERPL-SCNC: 20 MMOL/L — LOW (ref 22–31)
CO2 SERPL-SCNC: 22 MMOL/L — SIGNIFICANT CHANGE UP (ref 22–31)
CO2 SERPL-SCNC: 24 MMOL/L — SIGNIFICANT CHANGE UP (ref 22–31)
CREAT SERPL-MCNC: 0.79 MG/DL — SIGNIFICANT CHANGE UP (ref 0.5–1.3)
CREAT SERPL-MCNC: 0.8 MG/DL — SIGNIFICANT CHANGE UP (ref 0.5–1.3)
CREAT SERPL-MCNC: 0.84 MG/DL — SIGNIFICANT CHANGE UP (ref 0.5–1.3)
EOSINOPHIL # BLD AUTO: 0.08 K/UL — SIGNIFICANT CHANGE UP (ref 0–0.5)
EOSINOPHIL # BLD AUTO: 0.08 K/UL — SIGNIFICANT CHANGE UP (ref 0–0.5)
EOSINOPHIL # BLD AUTO: 0.15 K/UL — SIGNIFICANT CHANGE UP (ref 0–0.5)
EOSINOPHIL NFR BLD AUTO: 0.6 % — SIGNIFICANT CHANGE UP (ref 0–6)
EOSINOPHIL NFR BLD AUTO: 0.7 % — SIGNIFICANT CHANGE UP (ref 0–6)
EOSINOPHIL NFR BLD AUTO: 0.9 % — SIGNIFICANT CHANGE UP (ref 0–6)
GAS PNL BLDV: 137 MMOL/L — SIGNIFICANT CHANGE UP (ref 136–146)
GLUCOSE BLDC GLUCOMTR-MCNC: 104 MG/DL — HIGH (ref 70–99)
GLUCOSE BLDC GLUCOMTR-MCNC: 111 MG/DL — HIGH (ref 70–99)
GLUCOSE BLDC GLUCOMTR-MCNC: 118 MG/DL — HIGH (ref 70–99)
GLUCOSE BLDC GLUCOMTR-MCNC: 48 MG/DL — LOW (ref 70–99)
GLUCOSE BLDC GLUCOMTR-MCNC: 65 MG/DL — LOW (ref 70–99)
GLUCOSE BLDC GLUCOMTR-MCNC: 72 MG/DL — SIGNIFICANT CHANGE UP (ref 70–99)
GLUCOSE BLDC GLUCOMTR-MCNC: 73 MG/DL — SIGNIFICANT CHANGE UP (ref 70–99)
GLUCOSE BLDC GLUCOMTR-MCNC: 81 MG/DL — SIGNIFICANT CHANGE UP (ref 70–99)
GLUCOSE BLDC GLUCOMTR-MCNC: 82 MG/DL — SIGNIFICANT CHANGE UP (ref 70–99)
GLUCOSE BLDC GLUCOMTR-MCNC: 83 MG/DL — SIGNIFICANT CHANGE UP (ref 70–99)
GLUCOSE BLDC GLUCOMTR-MCNC: 85 MG/DL — SIGNIFICANT CHANGE UP (ref 70–99)
GLUCOSE BLDC GLUCOMTR-MCNC: 87 MG/DL — SIGNIFICANT CHANGE UP (ref 70–99)
GLUCOSE BLDC GLUCOMTR-MCNC: 88 MG/DL — SIGNIFICANT CHANGE UP (ref 70–99)
GLUCOSE BLDC GLUCOMTR-MCNC: 88 MG/DL — SIGNIFICANT CHANGE UP (ref 70–99)
GLUCOSE BLDC GLUCOMTR-MCNC: 90 MG/DL — SIGNIFICANT CHANGE UP (ref 70–99)
GLUCOSE BLDC GLUCOMTR-MCNC: 90 MG/DL — SIGNIFICANT CHANGE UP (ref 70–99)
GLUCOSE BLDC GLUCOMTR-MCNC: 92 MG/DL — SIGNIFICANT CHANGE UP (ref 70–99)
GLUCOSE BLDC GLUCOMTR-MCNC: 96 MG/DL — SIGNIFICANT CHANGE UP (ref 70–99)
GLUCOSE BLDV-MCNC: 100 MG/DL — HIGH (ref 70–99)
GLUCOSE SERPL-MCNC: 104 MG/DL — HIGH (ref 70–99)
GLUCOSE SERPL-MCNC: 46 MG/DL — LOW (ref 70–99)
GLUCOSE SERPL-MCNC: 91 MG/DL — SIGNIFICANT CHANGE UP (ref 70–99)
HBA1C BLD-MCNC: 4.5 % — SIGNIFICANT CHANGE UP (ref 4–5.6)
HCO3 BLDV-SCNC: 24 MMOL/L — SIGNIFICANT CHANGE UP (ref 20–27)
HCT VFR BLD CALC: 39.8 % — SIGNIFICANT CHANGE UP (ref 39–50)
HCT VFR BLD CALC: 42.5 % — SIGNIFICANT CHANGE UP (ref 39–50)
HCT VFR BLD CALC: 43.4 % — SIGNIFICANT CHANGE UP (ref 39–50)
HCT VFR BLD CALC: 43.4 % — SIGNIFICANT CHANGE UP (ref 39–50)
HCT VFR BLDV CALC: 46.6 % — SIGNIFICANT CHANGE UP (ref 39–51)
HGB BLD-MCNC: 13.3 G/DL — SIGNIFICANT CHANGE UP (ref 13–17)
HGB BLD-MCNC: 14.5 G/DL — SIGNIFICANT CHANGE UP (ref 13–17)
HGB BLD-MCNC: 14.6 G/DL — SIGNIFICANT CHANGE UP (ref 13–17)
HGB BLD-MCNC: 14.6 G/DL — SIGNIFICANT CHANGE UP (ref 13–17)
HGB BLDV-MCNC: 15.2 G/DL — SIGNIFICANT CHANGE UP (ref 13–17)
IMM GRANULOCYTES NFR BLD AUTO: 0.3 % — SIGNIFICANT CHANGE UP (ref 0–1.5)
IMM GRANULOCYTES NFR BLD AUTO: 0.4 % — SIGNIFICANT CHANGE UP (ref 0–1.5)
IMM GRANULOCYTES NFR BLD AUTO: 0.5 % — SIGNIFICANT CHANGE UP (ref 0–1.5)
LACTATE BLDV-MCNC: 1.4 MMOL/L — SIGNIFICANT CHANGE UP (ref 0.5–2)
LYMPHOCYTES # BLD AUTO: 1.58 K/UL — SIGNIFICANT CHANGE UP (ref 1–3.3)
LYMPHOCYTES # BLD AUTO: 1.83 K/UL — SIGNIFICANT CHANGE UP (ref 1–3.3)
LYMPHOCYTES # BLD AUTO: 12.3 % — LOW (ref 13–44)
LYMPHOCYTES # BLD AUTO: 15.5 % — SIGNIFICANT CHANGE UP (ref 13–44)
LYMPHOCYTES # BLD AUTO: 27.4 % — SIGNIFICANT CHANGE UP (ref 13–44)
LYMPHOCYTES # BLD AUTO: 4.54 K/UL — HIGH (ref 1–3.3)
MAGNESIUM SERPL-MCNC: 1.7 MG/DL — SIGNIFICANT CHANGE UP (ref 1.6–2.6)
MAGNESIUM SERPL-MCNC: 1.9 MG/DL — SIGNIFICANT CHANGE UP (ref 1.6–2.6)
MAGNESIUM SERPL-MCNC: 1.9 MG/DL — SIGNIFICANT CHANGE UP (ref 1.6–2.6)
MCHC RBC-ENTMCNC: 33.3 PG — SIGNIFICANT CHANGE UP (ref 27–34)
MCHC RBC-ENTMCNC: 33.4 % — SIGNIFICANT CHANGE UP (ref 32–36)
MCHC RBC-ENTMCNC: 33.6 % — SIGNIFICANT CHANGE UP (ref 32–36)
MCHC RBC-ENTMCNC: 33.6 % — SIGNIFICANT CHANGE UP (ref 32–36)
MCHC RBC-ENTMCNC: 33.9 PG — SIGNIFICANT CHANGE UP (ref 27–34)
MCHC RBC-ENTMCNC: 34.1 % — SIGNIFICANT CHANGE UP (ref 32–36)
MCHC RBC-ENTMCNC: 34.2 PG — HIGH (ref 27–34)
MCHC RBC-ENTMCNC: 34.2 PG — HIGH (ref 27–34)
MCV RBC AUTO: 101.5 FL — HIGH (ref 80–100)
MCV RBC AUTO: 101.6 FL — HIGH (ref 80–100)
MCV RBC AUTO: 101.6 FL — HIGH (ref 80–100)
MCV RBC AUTO: 97.7 FL — SIGNIFICANT CHANGE UP (ref 80–100)
MONOCYTES # BLD AUTO: 0.54 K/UL — SIGNIFICANT CHANGE UP (ref 0–0.9)
MONOCYTES # BLD AUTO: 0.55 K/UL — SIGNIFICANT CHANGE UP (ref 0–0.9)
MONOCYTES # BLD AUTO: 0.71 K/UL — SIGNIFICANT CHANGE UP (ref 0–0.9)
MONOCYTES NFR BLD AUTO: 4.2 % — SIGNIFICANT CHANGE UP (ref 2–14)
MONOCYTES NFR BLD AUTO: 4.3 % — SIGNIFICANT CHANGE UP (ref 2–14)
MONOCYTES NFR BLD AUTO: 4.6 % — SIGNIFICANT CHANGE UP (ref 2–14)
NEUTROPHILS # BLD AUTO: 10.59 K/UL — HIGH (ref 1.8–7.4)
NEUTROPHILS # BLD AUTO: 11.09 K/UL — HIGH (ref 1.8–7.4)
NEUTROPHILS # BLD AUTO: 9.33 K/UL — HIGH (ref 1.8–7.4)
NEUTROPHILS NFR BLD AUTO: 66.8 % — SIGNIFICANT CHANGE UP (ref 43–77)
NEUTROPHILS NFR BLD AUTO: 78.8 % — HIGH (ref 43–77)
NEUTROPHILS NFR BLD AUTO: 82.4 % — HIGH (ref 43–77)
NRBC # FLD: 0 K/UL — SIGNIFICANT CHANGE UP (ref 0–0)
PCO2 BLDV: 43 MMHG — SIGNIFICANT CHANGE UP (ref 41–51)
PH BLDV: 7.37 PH — SIGNIFICANT CHANGE UP (ref 7.32–7.43)
PHOSPHATE SERPL-MCNC: 2.1 MG/DL — LOW (ref 2.5–4.5)
PHOSPHATE SERPL-MCNC: 2.2 MG/DL — LOW (ref 2.5–4.5)
PHOSPHATE SERPL-MCNC: 2.4 MG/DL — LOW (ref 2.5–4.5)
PLATELET # BLD AUTO: 102 K/UL — LOW (ref 150–400)
PLATELET # BLD AUTO: 102 K/UL — LOW (ref 150–400)
PLATELET # BLD AUTO: 112 K/UL — LOW (ref 150–400)
PLATELET # BLD AUTO: 78 K/UL — LOW (ref 150–400)
PMV BLD: 12.3 FL — SIGNIFICANT CHANGE UP (ref 7–13)
PMV BLD: 12.4 FL — SIGNIFICANT CHANGE UP (ref 7–13)
PMV BLD: 12.5 FL — SIGNIFICANT CHANGE UP (ref 7–13)
PMV BLD: 12.5 FL — SIGNIFICANT CHANGE UP (ref 7–13)
PO2 BLDV: 65 MMHG — HIGH (ref 35–40)
POTASSIUM BLDV-SCNC: 4.2 MMOL/L — SIGNIFICANT CHANGE UP (ref 3.4–4.5)
POTASSIUM SERPL-MCNC: 4.6 MMOL/L — SIGNIFICANT CHANGE UP (ref 3.5–5.3)
POTASSIUM SERPL-MCNC: 4.7 MMOL/L — SIGNIFICANT CHANGE UP (ref 3.5–5.3)
POTASSIUM SERPL-MCNC: 4.8 MMOL/L — SIGNIFICANT CHANGE UP (ref 3.5–5.3)
POTASSIUM SERPL-SCNC: 4.6 MMOL/L — SIGNIFICANT CHANGE UP (ref 3.5–5.3)
POTASSIUM SERPL-SCNC: 4.7 MMOL/L — SIGNIFICANT CHANGE UP (ref 3.5–5.3)
POTASSIUM SERPL-SCNC: 4.8 MMOL/L — SIGNIFICANT CHANGE UP (ref 3.5–5.3)
PROT SERPL-MCNC: 6.5 G/DL — SIGNIFICANT CHANGE UP (ref 6–8.3)
PROT SERPL-MCNC: 6.5 G/DL — SIGNIFICANT CHANGE UP (ref 6–8.3)
PROT SERPL-MCNC: 6.6 G/DL — SIGNIFICANT CHANGE UP (ref 6–8.3)
RBC # BLD: 3.92 M/UL — LOW (ref 4.2–5.8)
RBC # BLD: 4.27 M/UL — SIGNIFICANT CHANGE UP (ref 4.2–5.8)
RBC # BLD: 4.27 M/UL — SIGNIFICANT CHANGE UP (ref 4.2–5.8)
RBC # BLD: 4.35 M/UL — SIGNIFICANT CHANGE UP (ref 4.2–5.8)
RBC # FLD: 12.6 % — SIGNIFICANT CHANGE UP (ref 10.3–14.5)
RBC # FLD: 12.9 % — SIGNIFICANT CHANGE UP (ref 10.3–14.5)
RBC # FLD: 13.1 % — SIGNIFICANT CHANGE UP (ref 10.3–14.5)
RBC # FLD: 13.1 % — SIGNIFICANT CHANGE UP (ref 10.3–14.5)
SAO2 % BLDV: 91.3 % — HIGH (ref 60–85)
SODIUM SERPL-SCNC: 131 MMOL/L — LOW (ref 135–145)
SODIUM SERPL-SCNC: 132 MMOL/L — LOW (ref 135–145)
SODIUM SERPL-SCNC: 134 MMOL/L — LOW (ref 135–145)
TRIGL SERPL-MCNC: 1114 MG/DL — HIGH (ref 10–149)
TRIGL SERPL-MCNC: 1697 MG/DL — HIGH (ref 10–149)
TRIGL SERPL-MCNC: 404 MG/DL — HIGH (ref 10–149)
TRIGL SERPL-MCNC: 481 MG/DL — HIGH (ref 10–149)
WBC # BLD: 11.84 K/UL — HIGH (ref 3.8–10.5)
WBC # BLD: 12.85 K/UL — HIGH (ref 3.8–10.5)
WBC # BLD: 16.59 K/UL — HIGH (ref 3.8–10.5)
WBC # BLD: 16.59 K/UL — HIGH (ref 3.8–10.5)
WBC # FLD AUTO: 11.84 K/UL — HIGH (ref 3.8–10.5)
WBC # FLD AUTO: 12.85 K/UL — HIGH (ref 3.8–10.5)
WBC # FLD AUTO: 16.59 K/UL — HIGH (ref 3.8–10.5)
WBC # FLD AUTO: 16.59 K/UL — HIGH (ref 3.8–10.5)

## 2020-08-31 PROCEDURE — 99232 SBSQ HOSP IP/OBS MODERATE 35: CPT | Mod: GC

## 2020-08-31 RX ORDER — INSULIN HUMAN 100 [IU]/ML
2 INJECTION, SOLUTION SUBCUTANEOUS
Qty: 100 | Refills: 0 | Status: DISCONTINUED | OUTPATIENT
Start: 2020-08-31 | End: 2020-08-31

## 2020-08-31 RX ORDER — ATORVASTATIN CALCIUM 80 MG/1
40 TABLET, FILM COATED ORAL AT BEDTIME
Refills: 0 | Status: DISCONTINUED | OUTPATIENT
Start: 2020-08-31 | End: 2020-09-02

## 2020-08-31 RX ORDER — INSULIN HUMAN 100 [IU]/ML
4 INJECTION, SOLUTION SUBCUTANEOUS
Qty: 100 | Refills: 0 | Status: DISCONTINUED | OUTPATIENT
Start: 2020-08-31 | End: 2020-08-31

## 2020-08-31 RX ORDER — SODIUM CHLORIDE 9 MG/ML
1000 INJECTION, SOLUTION INTRAVENOUS
Refills: 0 | Status: DISCONTINUED | OUTPATIENT
Start: 2020-08-31 | End: 2020-08-31

## 2020-08-31 RX ORDER — ACETAMINOPHEN 500 MG
1000 TABLET ORAL ONCE
Refills: 0 | Status: COMPLETED | OUTPATIENT
Start: 2020-08-31 | End: 2020-08-31

## 2020-08-31 RX ORDER — FENOFIBRATE,MICRONIZED 130 MG
48 CAPSULE ORAL DAILY
Refills: 0 | Status: DISCONTINUED | OUTPATIENT
Start: 2020-08-31 | End: 2020-09-01

## 2020-08-31 RX ORDER — DEXTROSE 50 % IN WATER 50 %
50 SYRINGE (ML) INTRAVENOUS ONCE
Refills: 0 | Status: COMPLETED | OUTPATIENT
Start: 2020-08-31 | End: 2020-08-31

## 2020-08-31 RX ADMIN — Medication 50 MILLILITER(S): at 08:30

## 2020-08-31 RX ADMIN — HYDROMORPHONE HYDROCHLORIDE 1 MILLIGRAM(S): 2 INJECTION INTRAMUSCULAR; INTRAVENOUS; SUBCUTANEOUS at 09:15

## 2020-08-31 RX ADMIN — HYDROMORPHONE HYDROCHLORIDE 1 MILLIGRAM(S): 2 INJECTION INTRAMUSCULAR; INTRAVENOUS; SUBCUTANEOUS at 04:40

## 2020-08-31 RX ADMIN — INSULIN HUMAN 3 UNIT(S)/HR: 100 INJECTION, SOLUTION SUBCUTANEOUS at 00:16

## 2020-08-31 RX ADMIN — HYDROMORPHONE HYDROCHLORIDE 1 MILLIGRAM(S): 2 INJECTION INTRAMUSCULAR; INTRAVENOUS; SUBCUTANEOUS at 09:00

## 2020-08-31 RX ADMIN — HYDROMORPHONE HYDROCHLORIDE 1 MILLIGRAM(S): 2 INJECTION INTRAMUSCULAR; INTRAVENOUS; SUBCUTANEOUS at 20:00

## 2020-08-31 RX ADMIN — INSULIN HUMAN 6 UNIT(S)/HR: 100 INJECTION, SOLUTION SUBCUTANEOUS at 08:40

## 2020-08-31 RX ADMIN — INSULIN HUMAN 5 UNIT(S)/HR: 100 INJECTION, SOLUTION SUBCUTANEOUS at 04:19

## 2020-08-31 RX ADMIN — SODIUM CHLORIDE 75 MILLILITER(S): 9 INJECTION, SOLUTION INTRAVENOUS at 00:16

## 2020-08-31 RX ADMIN — SODIUM CHLORIDE 150 MILLILITER(S): 9 INJECTION, SOLUTION INTRAVENOUS at 11:37

## 2020-08-31 RX ADMIN — Medication 48 MILLIGRAM(S): at 23:23

## 2020-08-31 RX ADMIN — Medication 1000 MILLIGRAM(S): at 22:59

## 2020-08-31 RX ADMIN — HYDROMORPHONE HYDROCHLORIDE 1 MILLIGRAM(S): 2 INJECTION INTRAMUSCULAR; INTRAVENOUS; SUBCUTANEOUS at 12:40

## 2020-08-31 RX ADMIN — HYDROMORPHONE HYDROCHLORIDE 1 MILLIGRAM(S): 2 INJECTION INTRAMUSCULAR; INTRAVENOUS; SUBCUTANEOUS at 04:19

## 2020-08-31 RX ADMIN — CHLORHEXIDINE GLUCONATE 1 APPLICATION(S): 213 SOLUTION TOPICAL at 11:35

## 2020-08-31 RX ADMIN — ENOXAPARIN SODIUM 40 MILLIGRAM(S): 100 INJECTION SUBCUTANEOUS at 11:33

## 2020-08-31 RX ADMIN — INSULIN HUMAN 4 UNIT(S)/HR: 100 INJECTION, SOLUTION SUBCUTANEOUS at 11:36

## 2020-08-31 RX ADMIN — HYDROMORPHONE HYDROCHLORIDE 1 MILLIGRAM(S): 2 INJECTION INTRAMUSCULAR; INTRAVENOUS; SUBCUTANEOUS at 19:23

## 2020-08-31 RX ADMIN — HYDROMORPHONE HYDROCHLORIDE 1 MILLIGRAM(S): 2 INJECTION INTRAMUSCULAR; INTRAVENOUS; SUBCUTANEOUS at 12:25

## 2020-08-31 RX ADMIN — SODIUM CHLORIDE 180 MILLILITER(S): 9 INJECTION, SOLUTION INTRAVENOUS at 04:19

## 2020-08-31 RX ADMIN — ATORVASTATIN CALCIUM 40 MILLIGRAM(S): 80 TABLET, FILM COATED ORAL at 22:59

## 2020-08-31 RX ADMIN — SODIUM CHLORIDE 200 MILLILITER(S): 9 INJECTION, SOLUTION INTRAVENOUS at 08:41

## 2020-08-31 RX ADMIN — Medication 400 MILLIGRAM(S): at 20:30

## 2020-08-31 NOTE — PROGRESS NOTE ADULT - SUBJECTIVE AND OBJECTIVE BOX
Siobhan Lillie, PGY1   Pager [phone]    51 yo M with hx of HLD presenting with abdominal pain, N/V found to have pancreatitis 2/2 hypertriglyceridemia.     Patient reports one episode of pancreatitis in the past with similar symptoms. He has worsening epigastric abdominal pain over the last 2 days with nausea and multiple episodes of vomiting. Patient initially placed on insulin drip but stopped due to hypokalemia on initial VBG. Insulin drip restarted after repeat K+.      Overnight events:  - TG downtrended to 1600  - Started D10 .5 NS + K  - Insulin gtt 8 u/h    SUBJECTIVE: Patient seen and examined at bedside.       OBJECTIVE:    VITAL SIGNS:  ICU Vital Signs Last 24 Hrs  T(C): 37.5 (31 Aug 2020 12:00), Max: 37.7 (31 Aug 2020 08:00)  T(F): 99.5 (31 Aug 2020 12:00), Max: 99.9 (31 Aug 2020 08:00)  HR: 97 (31 Aug 2020 13:00) (90 - 111)  BP: 121/82 (31 Aug 2020 13:00) (110/71 - 151/103)  BP(mean): 93 (31 Aug 2020 13:00) (79 - 104)  ABP: --  ABP(mean): --  RR: 18 (31 Aug 2020 13:00) (14 - 26)  SpO2: 94% (31 Aug 2020 13:00) (93% - 100%)        08-30 @ 07:01 - 08-31 @ 07:00  --------------------------------------------------------  IN: 1874 mL / OUT: 300 mL / NET: 1574 mL    08-31 @ 07:01 - 08-31 @ 13:34  --------------------------------------------------------  IN: 0 mL / OUT: 225 mL / NET: -225 mL      CAPILLARY BLOOD GLUCOSE      POCT Blood Glucose.: 72 mg/dL (31 Aug 2020 13:05)      PHYSICAL EXAM:  General: AAOx3 following commands  HEENT: NCAT, PERRL, clear conjunctiva, sclera anicteric  Neck: supple, no JVD  Respiratory: CTAB  Cardiovascular: RRR, S1S2, no m/r/g  Abdomen: TTP in the epigastrium with guarding, nondistended  Extremities: no edema, no cyanosis  Skin: warm, perfused  Neurological: nonfocal    MEDICATIONS:  MEDICATIONS  (STANDING):  chlorhexidine 4% Liquid 1 Application(s) Topical <User Schedule>  dextrose 10% + sodium chloride 0.45% 1000 milliLiter(s) (150 mL/Hr) IV Continuous <Continuous>  enoxaparin Injectable 40 milliGRAM(s) SubCutaneous daily  insulin regular Infusion 2 Unit(s)/Hr (2 mL/Hr) IV Continuous <Continuous>  simvastatin 20 milliGRAM(s) Oral at bedtime    MEDICATIONS  (PRN):  HYDROmorphone  Injectable 1 milliGRAM(s) IV Push every 4 hours PRN Pain      ALLERGIES:  No known allergies         LABS:                        14.6   16.59 )-----------( 102      ( 31 Aug 2020 08:20 )             43.4     08-31    134<L>  |  101  |  4<L>  ----------------------------<  46<L>  4.8   |  22  |  0.80    Ca    8.2<L>      31 Aug 2020 08:20  Phos  2.4     08-31  Mg     1.9     08-31    TPro  6.5  /  Alb  3.1<L>  /  TBili  1.3<H>  /  DBili  x   /  AST  64<H>  /  ALT  64<H>  /  AlkPhos  66  08-31      Urinalysis Basic - ( 30 Aug 2020 19:47 )    Color: YELLOW / Appearance: CLEAR / SG: > 1.040 / pH: 7.0  Gluc: NEGATIVE / Ketone: SMALL  / Bili: NEGATIVE / Urobili: NORMAL   Blood: NEGATIVE / Protein: 30 / Nitrite: NEGATIVE   Leuk Esterase: NEGATIVE / RBC: 0-2 / WBC 0-2   Sq Epi: OCC / Non Sq Epi: x / Bacteria: NEGATIVE        RADIOLOGY & ADDITIONAL TESTS: Reviewed.

## 2020-08-31 NOTE — CHART NOTE - NSCHARTNOTEFT_GEN_A_CORE
: Holley Hubbard    INDICATION: MICU Monitoring     PROCEDURE:  [* ] LIMITED ECHO  [* ] LIMITED CHEST  [ ] LIMITED RETROPERITONEAL  [ ] LIMITED ABDOMINAL  [ ] LIMITED DVT  [ ] NEEDLE GUIDANCE VASCULAR  [ ] NEEDLE GUIDANCE THORACENTESIS  [ ] NEEDLE GUIDANCE PARACENTESIS  [ ] NEEDLE GUIDANCE PERICARDIOCENTESIS  [ ] OTHER    FINDINGS/INTERPRETATION: Grossly normal cardiac function on multiple views, diffuse A lines on lung views          Images uploaded on [employer] [facility] : Holley Hubbard    INDICATION: MICU Monitoring     PROCEDURE:  [* ] LIMITED ECHO  [* ] LIMITED CHEST  [ ] LIMITED RETROPERITONEAL  [ ] LIMITED ABDOMINAL  [ ] LIMITED DVT  [ ] NEEDLE GUIDANCE VASCULAR  [ ] NEEDLE GUIDANCE THORACENTESIS  [ ] NEEDLE GUIDANCE PARACENTESIS  [ ] NEEDLE GUIDANCE PERICARDIOCENTESIS  [ ] OTHER    A line predominant in all lung fields  No bowing of the septum on parasternal short nor on parasternal long    INTERPRETATION:  Clear lungs  No evidence of right ventricular strain from multiple views  Grossly normal LV function from multiple views      Images uploaded on [employer] [facility]

## 2020-09-01 ENCOUNTER — TRANSCRIPTION ENCOUNTER (OUTPATIENT)
Age: 50
End: 2020-09-01

## 2020-09-01 DIAGNOSIS — D69.6 THROMBOCYTOPENIA, UNSPECIFIED: ICD-10-CM

## 2020-09-01 DIAGNOSIS — K85.90 ACUTE PANCREATITIS WITHOUT NECROSIS OR INFECTION, UNSPECIFIED: ICD-10-CM

## 2020-09-01 DIAGNOSIS — E78.1 PURE HYPERGLYCERIDEMIA: ICD-10-CM

## 2020-09-01 DIAGNOSIS — K85.80 OTHER ACUTE PANCREATITIS WITHOUT NECROSIS OR INFECTION: ICD-10-CM

## 2020-09-01 DIAGNOSIS — Z29.9 ENCOUNTER FOR PROPHYLACTIC MEASURES, UNSPECIFIED: ICD-10-CM

## 2020-09-01 DIAGNOSIS — R65.10 SYSTEMIC INFLAMMATORY RESPONSE SYNDROME (SIRS) OF NON-INFECTIOUS ORIGIN WITHOUT ACUTE ORGAN DYSFUNCTION: ICD-10-CM

## 2020-09-01 DIAGNOSIS — E78.5 HYPERLIPIDEMIA, UNSPECIFIED: ICD-10-CM

## 2020-09-01 DIAGNOSIS — I10 ESSENTIAL (PRIMARY) HYPERTENSION: ICD-10-CM

## 2020-09-01 LAB
ALBUMIN SERPL ELPH-MCNC: 3 G/DL — LOW (ref 3.3–5)
ALP SERPL-CCNC: 67 U/L — SIGNIFICANT CHANGE UP (ref 40–120)
ALT FLD-CCNC: 50 U/L — HIGH (ref 4–41)
ANION GAP SERPL CALC-SCNC: 15 MMO/L — HIGH (ref 7–14)
AST SERPL-CCNC: 34 U/L — SIGNIFICANT CHANGE UP (ref 4–40)
BILIRUB SERPL-MCNC: 2.1 MG/DL — HIGH (ref 0.2–1.2)
BUN SERPL-MCNC: 4 MG/DL — LOW (ref 7–23)
CALCIUM SERPL-MCNC: 9 MG/DL — SIGNIFICANT CHANGE UP (ref 8.4–10.5)
CHLORIDE SERPL-SCNC: 96 MMOL/L — LOW (ref 98–107)
CHOLEST SERPL-MCNC: 236 MG/DL — HIGH (ref 120–199)
CO2 SERPL-SCNC: 21 MMOL/L — LOW (ref 22–31)
CREAT SERPL-MCNC: 0.74 MG/DL — SIGNIFICANT CHANGE UP (ref 0.5–1.3)
GLUCOSE SERPL-MCNC: 92 MG/DL — SIGNIFICANT CHANGE UP (ref 70–99)
HCT VFR BLD CALC: 40.7 % — SIGNIFICANT CHANGE UP (ref 39–50)
HDLC SERPL-MCNC: 30 MG/DL — LOW (ref 35–55)
HGB BLD-MCNC: 13.2 G/DL — SIGNIFICANT CHANGE UP (ref 13–17)
LIPID PNL WITH DIRECT LDL SERPL: 152 MG/DL — SIGNIFICANT CHANGE UP
MAGNESIUM SERPL-MCNC: 1.8 MG/DL — SIGNIFICANT CHANGE UP (ref 1.6–2.6)
MCHC RBC-ENTMCNC: 32.4 % — SIGNIFICANT CHANGE UP (ref 32–36)
MCHC RBC-ENTMCNC: 33.1 PG — SIGNIFICANT CHANGE UP (ref 27–34)
MCV RBC AUTO: 102 FL — HIGH (ref 80–100)
NRBC # FLD: 0 K/UL — SIGNIFICANT CHANGE UP (ref 0–0)
PHOSPHATE SERPL-MCNC: 2.4 MG/DL — LOW (ref 2.5–4.5)
PLATELET # BLD AUTO: 78 K/UL — LOW (ref 150–400)
PMV BLD: 12.4 FL — SIGNIFICANT CHANGE UP (ref 7–13)
POTASSIUM SERPL-MCNC: 4.3 MMOL/L — SIGNIFICANT CHANGE UP (ref 3.5–5.3)
POTASSIUM SERPL-SCNC: 4.3 MMOL/L — SIGNIFICANT CHANGE UP (ref 3.5–5.3)
PROT SERPL-MCNC: 6.6 G/DL — SIGNIFICANT CHANGE UP (ref 6–8.3)
RBC # BLD: 3.99 M/UL — LOW (ref 4.2–5.8)
RBC # FLD: 12.8 % — SIGNIFICANT CHANGE UP (ref 10.3–14.5)
SODIUM SERPL-SCNC: 132 MMOL/L — LOW (ref 135–145)
TRIGL SERPL-MCNC: 316 MG/DL — HIGH (ref 10–149)
TRIGL SERPL-MCNC: 368 MG/DL — HIGH (ref 10–149)
TSH SERPL-MCNC: 1.57 UIU/ML — SIGNIFICANT CHANGE UP (ref 0.27–4.2)
VIT B12 SERPL-MCNC: 963 PG/ML — HIGH (ref 200–900)
WBC # BLD: 10.54 K/UL — HIGH (ref 3.8–10.5)
WBC # FLD AUTO: 10.54 K/UL — HIGH (ref 3.8–10.5)

## 2020-09-01 PROCEDURE — 99254 IP/OBS CNSLTJ NEW/EST MOD 60: CPT

## 2020-09-01 PROCEDURE — 99232 SBSQ HOSP IP/OBS MODERATE 35: CPT

## 2020-09-01 RX ORDER — HYDROMORPHONE HYDROCHLORIDE 2 MG/ML
2 INJECTION INTRAMUSCULAR; INTRAVENOUS; SUBCUTANEOUS EVERY 6 HOURS
Refills: 0 | Status: DISCONTINUED | OUTPATIENT
Start: 2020-09-01 | End: 2020-09-01

## 2020-09-01 RX ORDER — FENOFIBRATE,MICRONIZED 130 MG
145 CAPSULE ORAL DAILY
Refills: 0 | Status: DISCONTINUED | OUTPATIENT
Start: 2020-09-01 | End: 2020-09-02

## 2020-09-01 RX ORDER — HYDROMORPHONE HYDROCHLORIDE 2 MG/ML
1 INJECTION INTRAMUSCULAR; INTRAVENOUS; SUBCUTANEOUS EVERY 4 HOURS
Refills: 0 | Status: DISCONTINUED | OUTPATIENT
Start: 2020-09-01 | End: 2020-09-01

## 2020-09-01 RX ORDER — LANOLIN ALCOHOL/MO/W.PET/CERES
3 CREAM (GRAM) TOPICAL AT BEDTIME
Refills: 0 | Status: DISCONTINUED | OUTPATIENT
Start: 2020-09-01 | End: 2020-09-02

## 2020-09-01 RX ORDER — ACETAMINOPHEN 500 MG
650 TABLET ORAL EVERY 6 HOURS
Refills: 0 | Status: DISCONTINUED | OUTPATIENT
Start: 2020-09-01 | End: 2020-09-02

## 2020-09-01 RX ORDER — OMEGA-3 ACID ETHYL ESTERS 1 G
2 CAPSULE ORAL
Refills: 0 | Status: DISCONTINUED | OUTPATIENT
Start: 2020-09-01 | End: 2020-09-02

## 2020-09-01 RX ORDER — MAGNESIUM SULFATE 500 MG/ML
2 VIAL (ML) INJECTION ONCE
Refills: 0 | Status: COMPLETED | OUTPATIENT
Start: 2020-09-01 | End: 2020-09-01

## 2020-09-01 RX ORDER — HYDROMORPHONE HYDROCHLORIDE 2 MG/ML
1 INJECTION INTRAMUSCULAR; INTRAVENOUS; SUBCUTANEOUS EVERY 6 HOURS
Refills: 0 | Status: DISCONTINUED | OUTPATIENT
Start: 2020-09-01 | End: 2020-09-02

## 2020-09-01 RX ORDER — POLYETHYLENE GLYCOL 3350 17 G/17G
17 POWDER, FOR SOLUTION ORAL
Refills: 0 | Status: DISCONTINUED | OUTPATIENT
Start: 2020-09-01 | End: 2020-09-02

## 2020-09-01 RX ORDER — SODIUM,POTASSIUM PHOSPHATES 278-250MG
1 POWDER IN PACKET (EA) ORAL
Refills: 0 | Status: COMPLETED | OUTPATIENT
Start: 2020-09-01 | End: 2020-09-02

## 2020-09-01 RX ORDER — ATORVASTATIN CALCIUM 80 MG/1
1 TABLET, FILM COATED ORAL
Qty: 30 | Refills: 0
Start: 2020-09-01 | End: 2020-09-30

## 2020-09-01 RX ORDER — SENNA PLUS 8.6 MG/1
2 TABLET ORAL AT BEDTIME
Refills: 0 | Status: DISCONTINUED | OUTPATIENT
Start: 2020-09-01 | End: 2020-09-02

## 2020-09-01 RX ADMIN — HYDROMORPHONE HYDROCHLORIDE 2 MILLIGRAM(S): 2 INJECTION INTRAMUSCULAR; INTRAVENOUS; SUBCUTANEOUS at 13:50

## 2020-09-01 RX ADMIN — HYDROMORPHONE HYDROCHLORIDE 1 MILLIGRAM(S): 2 INJECTION INTRAMUSCULAR; INTRAVENOUS; SUBCUTANEOUS at 19:31

## 2020-09-01 RX ADMIN — Medication 2 GRAM(S): at 17:20

## 2020-09-01 RX ADMIN — Medication 3 MILLIGRAM(S): at 21:53

## 2020-09-01 RX ADMIN — HYDROMORPHONE HYDROCHLORIDE 2 MILLIGRAM(S): 2 INJECTION INTRAMUSCULAR; INTRAVENOUS; SUBCUTANEOUS at 03:29

## 2020-09-01 RX ADMIN — HYDROMORPHONE HYDROCHLORIDE 2 MILLIGRAM(S): 2 INJECTION INTRAMUSCULAR; INTRAVENOUS; SUBCUTANEOUS at 04:00

## 2020-09-01 RX ADMIN — Medication 1 PACKET(S): at 12:30

## 2020-09-01 RX ADMIN — Medication 48 MILLIGRAM(S): at 12:30

## 2020-09-01 RX ADMIN — HYDROMORPHONE HYDROCHLORIDE 1 MILLIGRAM(S): 2 INJECTION INTRAMUSCULAR; INTRAVENOUS; SUBCUTANEOUS at 20:00

## 2020-09-01 RX ADMIN — ATORVASTATIN CALCIUM 40 MILLIGRAM(S): 80 TABLET, FILM COATED ORAL at 21:53

## 2020-09-01 RX ADMIN — Medication 50 GRAM(S): at 08:33

## 2020-09-01 RX ADMIN — ENOXAPARIN SODIUM 40 MILLIGRAM(S): 100 INJECTION SUBCUTANEOUS at 12:30

## 2020-09-01 RX ADMIN — HYDROMORPHONE HYDROCHLORIDE 2 MILLIGRAM(S): 2 INJECTION INTRAMUSCULAR; INTRAVENOUS; SUBCUTANEOUS at 13:20

## 2020-09-01 RX ADMIN — SENNA PLUS 2 TABLET(S): 8.6 TABLET ORAL at 21:53

## 2020-09-01 RX ADMIN — Medication 1 PACKET(S): at 17:19

## 2020-09-01 NOTE — PROGRESS NOTE ADULT - SUBJECTIVE AND OBJECTIVE BOX
Authored by Ben Martínez MD ([phone]) [facility]    Patient is a 50y old  Male who presents with a chief complaint of pancreatitis (31 Aug 2020 13:34)    SUBJECTIVE / OVERNIGHT EVENTS:    ADDITIONAL REVIEW OF SYSTEMS:    MEDICATIONS  (STANDING):  atorvastatin 40 milliGRAM(s) Oral at bedtime  enoxaparin Injectable 40 milliGRAM(s) SubCutaneous daily  fenofibrate Tablet 48 milliGRAM(s) Oral daily  potassium phosphate / sodium phosphate Powder (PHOS-NaK) 1 Packet(s) Oral three times a day before meals    MEDICATIONS  (PRN):  acetaminophen   Tablet .. 650 milliGRAM(s) Oral every 6 hours PRN Mild Pain (1 - 3), Moderate Pain (4 - 6)  HYDROmorphone   Tablet 2 milliGRAM(s) Oral every 6 hours PRN Severe Pain (7 - 10)    CAPILLARY BLOOD GLUCOSE  POCT Blood Glucose.: 81 mg/dL (31 Aug 2020 19:09)  POCT Blood Glucose.: 83 mg/dL (31 Aug 2020 18:03)  POCT Blood Glucose.: 85 mg/dL (31 Aug 2020 17:05)  POCT Blood Glucose.: 88 mg/dL (31 Aug 2020 16:05)  POCT Blood Glucose.: 104 mg/dL (31 Aug 2020 14:59)  POCT Blood Glucose.: 92 mg/dL (31 Aug 2020 14:17)  POCT Blood Glucose.: 72 mg/dL (31 Aug 2020 13:05)  POCT Blood Glucose.: 96 mg/dL (31 Aug 2020 12:07)  POCT Blood Glucose.: 87 mg/dL (31 Aug 2020 11:14)  POCT Blood Glucose.: 73 mg/dL (31 Aug 2020 10:06)  POCT Blood Glucose.: 118 mg/dL (31 Aug 2020 08:56)  POCT Blood Glucose.: 65 mg/dL (31 Aug 2020 08:17)  POCT Blood Glucose.: 48 mg/dL (31 Aug 2020 08:14)    I&O's Summary    31 Aug 2020 07:01  -  01 Sep 2020 07:00  --------------------------------------------------------  IN: 1556 mL / OUT: 950 mL / NET: 606 mL    PHYSICAL EXAM:  Vital Signs Last 24 Hrs  T(C): 37 (01 Sep 2020 05:32), Max: 38.5 (31 Aug 2020 20:00)  T(F): 98.6 (01 Sep 2020 05:32), Max: 101.3 (31 Aug 2020 20:00)  HR: 92 (01 Sep 2020 05:32) (87 - 107)  BP: 117/72 (01 Sep 2020 05:32) (108/59 - 139/92)  BP(mean): 92 (01 Sep 2020 03:00) (75 - 105)  RR: 18 (01 Sep 2020 05:32) (10 - 29)  SpO2: 99% (01 Sep 2020 05:32) (94% - 100%)    GENERAL: No acute distress, well-developed  HEAD:  Atraumatic, Normocephalic  EYES: EOMI, PERRLA, conjunctiva and sclera clear  NECK: Supple, no lymphadenopathy, no JVD  CHEST/LUNG: CTAB; No wheezes, rales, or rhonchi  HEART: Regular rate and rhythm; No murmurs, rubs, or gallops  ABDOMEN: Soft, non-tender, non-distended; normal bowel sounds, no organomegaly  EXTREMITIES:  2+ peripheral pulses b/l, No clubbing, cyanosis, or edema  NEUROLOGY: A&O x 3, no focal deficits  SKIN: No rashes or lesions    LABS:                        13.2   10.54 )-----------( 78       ( 01 Sep 2020 02:35 )             40.7     09-01    132<L>  |  96<L>  |  4<L>  ----------------------------<  92  4.3   |  21<L>  |  0.74    Ca    9.0      01 Sep 2020 02:35  Phos  2.4     09-01  Mg     1.8     09-01    TPro  6.6  /  Alb  3.0<L>  /  TBili  2.1<H>  /  DBili  x   /  AST  34  /  ALT  50<H>  /  AlkPhos  67  09-01    Urinalysis Basic - ( 30 Aug 2020 19:47 )  Color: YELLOW / Appearance: CLEAR / SG: > 1.040 / pH: 7.0  Gluc: NEGATIVE / Ketone: SMALL  / Bili: NEGATIVE / Urobili: NORMAL   Blood: NEGATIVE / Protein: 30 / Nitrite: NEGATIVE   Leuk Esterase: NEGATIVE / RBC: 0-2 / WBC 0-2   Sq Epi: OCC / Non Sq Epi: x / Bacteria: NEGATIVE    Culture - Blood (collected 30 Aug 2020 21:23)  Source: .Blood Blood  Preliminary Report (31 Aug 2020 22:02):    No growth to date.    Culture - Blood (collected 30 Aug 2020 21:23)  Source: .Blood Blood  Preliminary Report (31 Aug 2020 22:02):    No growth to date. Authored by Ben Martínez MD ([phone]) [facility]    Patient is a 50y old  Male who presents with a chief complaint of pancreatitis (31 Aug 2020 13:34)    SUBJECTIVE / OVERNIGHT EVENTS: NAEON. This am pt notes abd pain but is improved since admission. Notes the dilaudid reduces the pain from 7/10 to 3/10. Notes last BM on 8/29.     ADDITIONAL REVIEW OF SYSTEMS:  Denies HA, cp, SOB, dysuria, hematuria    MEDICATIONS  (STANDING):  atorvastatin 40 milliGRAM(s) Oral at bedtime  enoxaparin Injectable 40 milliGRAM(s) SubCutaneous daily  fenofibrate Tablet 48 milliGRAM(s) Oral daily  potassium phosphate / sodium phosphate Powder (PHOS-NaK) 1 Packet(s) Oral three times a day before meals    MEDICATIONS  (PRN):  acetaminophen   Tablet .. 650 milliGRAM(s) Oral every 6 hours PRN Mild Pain (1 - 3), Moderate Pain (4 - 6)  HYDROmorphone   Tablet 2 milliGRAM(s) Oral every 6 hours PRN Severe Pain (7 - 10)    CAPILLARY BLOOD GLUCOSE  POCT Blood Glucose.: 81 mg/dL (31 Aug 2020 19:09)  POCT Blood Glucose.: 83 mg/dL (31 Aug 2020 18:03)  POCT Blood Glucose.: 85 mg/dL (31 Aug 2020 17:05)  POCT Blood Glucose.: 88 mg/dL (31 Aug 2020 16:05)  POCT Blood Glucose.: 104 mg/dL (31 Aug 2020 14:59)  POCT Blood Glucose.: 92 mg/dL (31 Aug 2020 14:17)  POCT Blood Glucose.: 72 mg/dL (31 Aug 2020 13:05)  POCT Blood Glucose.: 96 mg/dL (31 Aug 2020 12:07)  POCT Blood Glucose.: 87 mg/dL (31 Aug 2020 11:14)  POCT Blood Glucose.: 73 mg/dL (31 Aug 2020 10:06)  POCT Blood Glucose.: 118 mg/dL (31 Aug 2020 08:56)  POCT Blood Glucose.: 65 mg/dL (31 Aug 2020 08:17)  POCT Blood Glucose.: 48 mg/dL (31 Aug 2020 08:14)    I&O's Summary    31 Aug 2020 07:01  -  01 Sep 2020 07:00  --------------------------------------------------------  IN: 1556 mL / OUT: 950 mL / NET: 606 mL    PHYSICAL EXAM:  Vital Signs Last 24 Hrs  T(C): 37 (01 Sep 2020 05:32), Max: 38.5 (31 Aug 2020 20:00)  T(F): 98.6 (01 Sep 2020 05:32), Max: 101.3 (31 Aug 2020 20:00)  HR: 92 (01 Sep 2020 05:32) (87 - 107)  BP: 117/72 (01 Sep 2020 05:32) (108/59 - 139/92)  BP(mean): 92 (01 Sep 2020 03:00) (75 - 105)  RR: 18 (01 Sep 2020 05:32) (10 - 29)  SpO2: 99% (01 Sep 2020 05:32) (94% - 100%)    GENERAL: No acute distress, well-developed  HEAD:  Atraumatic, Normocephalic  EYES: EOMI, conjunctiva and sclera clear  NECK: Supple, no JVD  CHEST/LUNG: CTAB; No wheezes, rales, or rhonchi  HEART: Regular rate and rhythm; No murmurs, rubs, or gallops  ABDOMEN: +Midepigastric tenderness to palpation, mild tenderness at LLQ. Soft, non-distended; normal bowel sounds, no organomegaly  EXTREMITIES:  2+ peripheral pulses b/l, No clubbing, cyanosis, or edema  NEUROLOGY: A&O x 3, no focal deficits  SKIN: No rashes or lesions    LABS:                        13.2   10.54 )-----------( 78       ( 01 Sep 2020 02:35 )             40.7     09-01    132<L>  |  96<L>  |  4<L>  ----------------------------<  92  4.3   |  21<L>  |  0.74    Ca    9.0      01 Sep 2020 02:35  Phos  2.4     09-01  Mg     1.8     09-01    TPro  6.6  /  Alb  3.0<L>  /  TBili  2.1<H>  /  DBili  x   /  AST  34  /  ALT  50<H>  /  AlkPhos  67  09-01    Urinalysis Basic - ( 30 Aug 2020 19:47 )  Color: YELLOW / Appearance: CLEAR / SG: > 1.040 / pH: 7.0  Gluc: NEGATIVE / Ketone: SMALL  / Bili: NEGATIVE / Urobili: NORMAL   Blood: NEGATIVE / Protein: 30 / Nitrite: NEGATIVE   Leuk Esterase: NEGATIVE / RBC: 0-2 / WBC 0-2   Sq Epi: OCC / Non Sq Epi: x / Bacteria: NEGATIVE    Culture - Blood (collected 30 Aug 2020 21:23)  Source: .Blood Blood  Preliminary Report (31 Aug 2020 22:02):    No growth to date.    Culture - Blood (collected 30 Aug 2020 21:23)  Source: .Blood Blood  Preliminary Report (31 Aug 2020 22:02):    No growth to date.

## 2020-09-01 NOTE — PROGRESS NOTE ADULT - PROBLEM SELECTOR PLAN 3
Improved, currently normotensive.  - c/w Improving  - c/w atorvastatin 40 mg qhs  - c/w fenofibrate 48 mg qd Improving  - c/w atorvastatin 40 mg qhs  - increase fenofibrate to 145 mg qd

## 2020-09-01 NOTE — CONSULT NOTE ADULT - ASSESSMENT
50 yr old M with PMH of HLD here with HTG induced pancreatitis. Patient was treated in the MICU wt insulin gtt and D10. TG [id number]

## 2020-09-01 NOTE — CONSULT NOTE ADULT - PROBLEM SELECTOR RECOMMENDATION 9
-Patient was cousnelled to limit simple sugras, fatty foods as well as avoid ETOH use  -Contineu atroavstatin 40mg daily, increase fenofibrate to 145mg daily and start Lovaza 2g BID  -F/U for repeat TG in 4-6 weeks as outpt  -Patient would beenfit from nutrional consutlation  -A1C and TSH WNL  -He can follow up in Endocrine Office [address] [facility] [place] [phone] -Patient was counselled to limit simple sugars, fatty foods as well as avoid ETOH use  -Continue atorvastatin 40mg daily, increase fenofibrate to 145mg daily and start Lovaza 2g BID  -F/U for repeat TG in 4-6 weeks as outpt  -Patient would benefit from nutritional consultation  -A1C and TSH WNL  -He can follow up in Endocrine Office [address] [facility] [phone]

## 2020-09-01 NOTE — CONSULT NOTE ADULT - SUBJECTIVE AND OBJECTIVE BOX
HPI:  51 yo M with hx of HLD presenting with abdominal pain, N/V found to have pancreatitis 2/2 hypertriglyceridemia.     Patient reports one episode of pancreatitis in the past with similar symptoms. He has worsening epigastric abdominal pain over the last 2 days with nausea and multiple episodes of vomiting. Patient initially placed on insulin drip but stopped due to hypokalemia on initial VBG. Insulin drip restarted after repeat K+. (30 Aug 2020 14:44)      PAST MEDICAL & SURGICAL HISTORY:  Dyslipidemia  Hypertension  No significant past surgical history      FAMILY HISTORY:  FH: GI cancer: father  Family history of bacterial pneumonia: mother      Social History:    Outpatient Medications:    MEDICATIONS  (STANDING):  atorvastatin 40 milliGRAM(s) Oral at bedtime  enoxaparin Injectable 40 milliGRAM(s) SubCutaneous daily  fenofibrate Tablet 48 milliGRAM(s) Oral daily  potassium phosphate / sodium phosphate Powder (PHOS-NaK) 1 Packet(s) Oral three times a day before meals    MEDICATIONS  (PRN):  acetaminophen   Tablet .. 650 milliGRAM(s) Oral every 6 hours PRN Mild Pain (1 - 3), Moderate Pain (4 - 6)  HYDROmorphone   Tablet 2 milliGRAM(s) Oral every 6 hours PRN Severe Pain (7 - 10)      Allergies    No Known Allergies    Intolerances      Review of Systems:  Constitutional: No fever  Eyes: No blurry vision  Neuro: No tremors  HEENT: No pain  Cardiovascular: No chest pain, palpitations  Respiratory: No SOB, no cough  GI: No nausea, vomiting, abdominal pain  : No dysuria  Skin: no rash  Psych: no depression  Endocrine: no polyuria, polydipsia  Hem/lymph: no swelling  Osteoporosis: no fractures    ALL OTHER SYSTEMS REVIEWED AND NEGATIVE    UNABLE TO OBTAIN    PHYSICAL EXAM:  VITALS: T(C): 37 (09-01-20 @ 05:32)  T(F): 98.6 (09-01-20 @ 05:32), Max: 101.3 (08-31-20 @ 20:00)  HR: 92 (09-01-20 @ 05:32) (87 - 107)  BP: 117/72 (09-01-20 @ 05:32) (108/59 - 139/92)  RR:  (10 - 29)  SpO2:  (94% - 100%)  Wt(kg): --  GENERAL: NAD, well-groomed, well-developed  EYES: No proptosis, no lid lag, anicteric  HEENT:  Atraumatic, Normocephalic, moist mucous membranes  THYROID: Normal size, no palpable nodules  RESPIRATORY: Clear to auscultation bilaterally; No rales, rhonchi, wheezing, or rubs  CARDIOVASCULAR: Regular rate and rhythm; No murmurs; no peripheral edema  GI: Soft, nontender, non distended, normal bowel sounds  SKIN: Dry, intact, No rashes or lesions  MUSCULOSKELETAL: Full range of motion, normal strength  NEURO: sensation intact, extraocular movements intact, no tremor, normal reflexes  PSYCH: Alert and oriented x 3, normal affect, normal mood  CUSHING'S SIGNS: no striae    POCT Blood Glucose.: 81 mg/dL (08-31-20 @ 19:09)  POCT Blood Glucose.: 83 mg/dL (08-31-20 @ 18:03)  POCT Blood Glucose.: 85 mg/dL (08-31-20 @ 17:05)  POCT Blood Glucose.: 88 mg/dL (08-31-20 @ 16:05)  POCT Blood Glucose.: 104 mg/dL (08-31-20 @ 14:59)  POCT Blood Glucose.: 92 mg/dL (08-31-20 @ 14:17)  POCT Blood Glucose.: 72 mg/dL (08-31-20 @ 13:05)  POCT Blood Glucose.: 96 mg/dL (08-31-20 @ 12:07)  POCT Blood Glucose.: 87 mg/dL (08-31-20 @ 11:14)  POCT Blood Glucose.: 73 mg/dL (08-31-20 @ 10:06)  POCT Blood Glucose.: 118 mg/dL (08-31-20 @ 08:56)  POCT Blood Glucose.: 65 mg/dL (08-31-20 @ 08:17)  POCT Blood Glucose.: 48 mg/dL (08-31-20 @ 08:14)  POCT Blood Glucose.: 88 mg/dL (08-31-20 @ 06:56)  POCT Blood Glucose.: 90 mg/dL (08-31-20 @ 06:08)  POCT Blood Glucose.: 85 mg/dL (08-31-20 @ 04:58)  POCT Blood Glucose.: 90 mg/dL (08-31-20 @ 04:11)  POCT Blood Glucose.: 82 mg/dL (08-31-20 @ 03:04)  POCT Blood Glucose.: 111 mg/dL (08-31-20 @ 02:04)  POCT Blood Glucose.: 85 mg/dL (08-31-20 @ 01:07)  POCT Blood Glucose.: 85 mg/dL (08-31-20 @ 00:06)  POCT Blood Glucose.: 74 mg/dL (08-30-20 @ 23:15)  POCT Blood Glucose.: 138 mg/dL (08-30-20 @ 21:50)  POCT Blood Glucose.: 162 mg/dL (08-30-20 @ 20:53)  POCT Blood Glucose.: 120 mg/dL (08-30-20 @ 20:09)  POCT Blood Glucose.: 80 mg/dL (08-30-20 @ 16:41)  POCT Blood Glucose.: 102 mg/dL (08-30-20 @ 15:33)  POCT Blood Glucose.: 127 mg/dL (08-30-20 @ 14:38)                            13.2   10.54 )-----------( 78       ( 01 Sep 2020 02:35 )             40.7       09-01    132<L>  |  96<L>  |  4<L>  ----------------------------<  92  4.3   |  21<L>  |  0.74    EGFR if : 124  EGFR if non : 107    Ca    9.0      09-01  Mg     1.8     09-01  Phos  2.4     09-01    TPro  6.6  /  Alb  3.0<L>  /  TBili  2.1<H>  /  DBili  x   /  AST  34  /  ALT  50<H>  /  AlkPhos  67  09-01      Thyroid Function Tests:  09-01 @ 02:35 TSH 1.57 FreeT4 -- T3 -- Anti TPO -- Anti Thyroglobulin Ab -- TSI --          09-01 Chol 236<H>  HDL 30<L> Trig 368<H>, 08-31 Chol -- LDL -- HDL -- Trig 404<H>, 08-31 Chol -- LDL -- HDL -- Trig 481<H>, 08-31 Chol -- LDL -- HDL -- Trig 1114<H>, 08-31 Chol -- LDL -- HDL -- Trig 1697<H>, 08-30 Chol -- LDL -- HDL -- Trig 2334<H>, 08-30 Chol -- LDL -- HDL -- Trig 3801<H>    Radiology: HPI:  49 yo M with hx of HLD presenting with abdominal pain, N/V found to have pancreatitis 2/2 hypertriglyceridemia.     Patient reports one episode of pancreatitis in the past with similar symptoms. He has worsening epigastric abdominal pain over the last 2 days with nausea and multiple episodes of vomiting. Patient initially placed on insulin drip but stopped due to hypokalemia on initial VBG. Insulin drip restarted after repeat K+. (30 Aug 2020 14:44)      Endocrine History:    50 yr old M with PMH of HLD here with pancreatitis due to HTG. Patient found to have TG 1697 on arrival. He was hosptializaed for ETOH induced pancreatitis 1 years ago. Three months ago he was started on simvastatin for high cholestrol by his PMD. No FH of lipid disorders. States that he has been liberal with his diet adn consum,ing more ETOH than usual over the past few weeks. Usually has meals cosnsiting of meat and vegetabeles and tries to avoid fatty foods. Drinsk soda nd juice. No Hx of DM. No thyroid dysfucntion. No xanthomas.       PAST MEDICAL & SURGICAL HISTORY:  Dyslipidemia  Hypertension  No significant past surgical history      FAMILY HISTORY:  FH: GI cancer: father  Family history of bacterial pneumonia: mother      Social History: +ETOH use, No drug use    Outpatient Medications:  · 	ondansetron 4 mg oral tablet: 1 tab(s) orally 3 times a day, As Needed -for nausea   · 	oxycodone-acetaminophen 5 mg-325 mg oral tablet: 1 tab(s) orally 3 times a day, As Needed -for severe pain MDD:3   · 	traMADol 50 mg oral tablet: 25 milligram(s) orally 2 times a day, As Needed -for moderate pain MDD:MDD 50 mg  · 	thiamine 100 mg oral tablet: 1 tab(s) orally once a day  · 	folic acid 1 mg oral tablet: 1 tab(s) orally once a day  · 	Multiple Vitamins oral tablet: 1 tab(s) orally once a day  	Simvastatin (unknown dose)    MEDICATIONS  (STANDING):  atorvastatin 40 milliGRAM(s) Oral at bedtime  enoxaparin Injectable 40 milliGRAM(s) SubCutaneous daily  fenofibrate Tablet 48 milliGRAM(s) Oral daily  potassium phosphate / sodium phosphate Powder (PHOS-NaK) 1 Packet(s) Oral three times a day before meals    MEDICATIONS  (PRN):  acetaminophen   Tablet .. 650 milliGRAM(s) Oral every 6 hours PRN Mild Pain (1 - 3), Moderate Pain (4 - 6)  HYDROmorphone   Tablet 2 milliGRAM(s) Oral every 6 hours PRN Severe Pain (7 - 10)      Allergies    No Known Allergies    Intolerances      Review of Systems:  Constitutional: No fever  Eyes: No blurry vision  Neuro: No tremors  HEENT: No pain  Cardiovascular: No chest pain, palpitations  Respiratory: No SOB, no cough  GI: No nausea, vomiting, abdominal pain  : No dysuria  Skin: no rash  Psych: no depression  Endocrine: no polyuria, polydipsia  Hem/lymph: no swelling  Osteoporosis: no fractures    ALL OTHER SYSTEMS REVIEWED AND NEGATIVE      PHYSICAL EXAM:  VITALS: T(C): 37 (09-01-20 @ 05:32)  T(F): 98.6 (09-01-20 @ 05:32), Max: 101.3 (08-31-20 @ 20:00)  HR: 92 (09-01-20 @ 05:32) (87 - 107)  BP: 117/72 (09-01-20 @ 05:32) (108/59 - 139/92)  RR:  (10 - 29)  SpO2:  (94% - 100%)  Wt(kg): --  GENERAL: NAD, well-groomed, well-developed  EYES: No proptosis, no lid lag, anicteric  HEENT:  Atraumatic, Normocephalic, moist mucous membranes  RESPIRATORY: Clear to auscultation bilaterally; No rales, rhonchi, wheezing, or rubs  CARDIOVASCULAR: Regular rate and rhythm; No murmurs; no peripheral edema  GI: Soft, nontender, non distended, normal bowel sounds  PSYCH: AAO X 3  NEURO: No Tremors, normal reflexes      POCT Blood Glucose.: 81 mg/dL (08-31-20 @ 19:09)  POCT Blood Glucose.: 83 mg/dL (08-31-20 @ 18:03)  POCT Blood Glucose.: 85 mg/dL (08-31-20 @ 17:05)  POCT Blood Glucose.: 88 mg/dL (08-31-20 @ 16:05)  POCT Blood Glucose.: 104 mg/dL (08-31-20 @ 14:59)  POCT Blood Glucose.: 92 mg/dL (08-31-20 @ 14:17)  POCT Blood Glucose.: 72 mg/dL (08-31-20 @ 13:05)  POCT Blood Glucose.: 96 mg/dL (08-31-20 @ 12:07)  POCT Blood Glucose.: 87 mg/dL (08-31-20 @ 11:14)  POCT Blood Glucose.: 73 mg/dL (08-31-20 @ 10:06)  POCT Blood Glucose.: 118 mg/dL (08-31-20 @ 08:56)  POCT Blood Glucose.: 65 mg/dL (08-31-20 @ 08:17)  POCT Blood Glucose.: 48 mg/dL (08-31-20 @ 08:14)  POCT Blood Glucose.: 88 mg/dL (08-31-20 @ 06:56)  POCT Blood Glucose.: 90 mg/dL (08-31-20 @ 06:08)  POCT Blood Glucose.: 85 mg/dL (08-31-20 @ 04:58)  POCT Blood Glucose.: 90 mg/dL (08-31-20 @ 04:11)  POCT Blood Glucose.: 82 mg/dL (08-31-20 @ 03:04)  POCT Blood Glucose.: 111 mg/dL (08-31-20 @ 02:04)  POCT Blood Glucose.: 85 mg/dL (08-31-20 @ 01:07)  POCT Blood Glucose.: 85 mg/dL (08-31-20 @ 00:06)  POCT Blood Glucose.: 74 mg/dL (08-30-20 @ 23:15)  POCT Blood Glucose.: 138 mg/dL (08-30-20 @ 21:50)  POCT Blood Glucose.: 162 mg/dL (08-30-20 @ 20:53)  POCT Blood Glucose.: 120 mg/dL (08-30-20 @ 20:09)  POCT Blood Glucose.: 80 mg/dL (08-30-20 @ 16:41)  POCT Blood Glucose.: 102 mg/dL (08-30-20 @ 15:33)  POCT Blood Glucose.: 127 mg/dL (08-30-20 @ 14:38)                            13.2   10.54 )-----------( 78       ( 01 Sep 2020 02:35 )             40.7       09-01    132<L>  |  96<L>  |  4<L>  ----------------------------<  92  4.3   |  21<L>  |  0.74    EGFR if : 124  EGFR if non : 107    Ca    9.0      09-01  Mg     1.8     09-01  Phos  2.4     09-01    TPro  6.6  /  Alb  3.0<L>  /  TBili  2.1<H>  /  DBili  x   /  AST  34  /  ALT  50<H>  /  AlkPhos  67  09-01      Thyroid Function Tests:  09-01 @ 02:35 TSH 1.57 FreeT4 -- T3 -- Anti TPO -- Anti Thyroglobulin Ab -- TSI --          09-01 Chol 236<H>  HDL 30<L> Trig 368<H>, 08-31 Chol -- LDL -- HDL -- Trig 404<H>, 08-31 Chol -- LDL -- HDL -- Trig 481<H>, 08-31 Chol -- LDL -- HDL -- Trig 1114<H>, 08-31 Chol -- LDL -- HDL -- Trig 1697<H>, 08-30 Chol -- LDL -- HDL -- Trig 2334<H>, 08-30 Chol -- LDL -- HDL -- Trig 3801<H> HPI:  49 yo M with hx of HLD presenting with abdominal pain, N/V found to have pancreatitis 2/2 hypertriglyceridemia.     Patient reports one episode of pancreatitis in the past with similar symptoms. He has worsening epigastric abdominal pain over the last 2 days with nausea and multiple episodes of vomiting. Patient initially placed on insulin drip but stopped due to hypokalemia on initial VBG. Insulin drip restarted after repeat K+. (30 Aug 2020 14:44)      Endocrine History:    50 yr old M with PMH of HLD here with pancreatitis due to HTG. Patient found to have TG 1697 on arrival. He was hospitalized for ETOH induced pancreatitis 1 years ago. Three months ago he was started on simvastatin for high cholesterol by his PMD. No FH of lipid disorders. States that he has been liberal with his diet and consuming more ETOH than usual over the past few weeks. Usually has meals cosnsiting of meat and vegetables and tries to avoid fatty foods. Drinsk soda nd juice. No Hx of DM. No thyroid dysfunction. No xanthomas.       PAST MEDICAL & SURGICAL HISTORY:  Dyslipidemia  Hypertension  No significant past surgical history      FAMILY HISTORY:  FH: GI cancer: father  Family history of bacterial pneumonia: mother      Social History: +ETOH use, No drug use    Outpatient Medications:  · 	ondansetron 4 mg oral tablet: 1 tab(s) orally 3 times a day, As Needed -for nausea   · 	oxycodone-acetaminophen 5 mg-325 mg oral tablet: 1 tab(s) orally 3 times a day, As Needed -for severe pain MDD:3   · 	traMADol 50 mg oral tablet: 25 milligram(s) orally 2 times a day, As Needed -for moderate pain MDD:MDD 50 mg  · 	thiamine 100 mg oral tablet: 1 tab(s) orally once a day  · 	folic acid 1 mg oral tablet: 1 tab(s) orally once a day  · 	Multiple Vitamins oral tablet: 1 tab(s) orally once a day  	Simvastatin (unknown dose)    MEDICATIONS  (STANDING):  atorvastatin 40 milliGRAM(s) Oral at bedtime  enoxaparin Injectable 40 milliGRAM(s) SubCutaneous daily  fenofibrate Tablet 48 milliGRAM(s) Oral daily  potassium phosphate / sodium phosphate Powder (PHOS-NaK) 1 Packet(s) Oral three times a day before meals    MEDICATIONS  (PRN):  acetaminophen   Tablet .. 650 milliGRAM(s) Oral every 6 hours PRN Mild Pain (1 - 3), Moderate Pain (4 - 6)  HYDROmorphone   Tablet 2 milliGRAM(s) Oral every 6 hours PRN Severe Pain (7 - 10)      Allergies    No Known Allergies    Intolerances      Review of Systems:  Constitutional: No fever  Eyes: No blurry vision  Neuro: No tremors  HEENT: No pain  Cardiovascular: No chest pain, palpitations  Respiratory: No SOB, no cough  GI: No nausea, vomiting, abdominal pain  : No dysuria  Skin: no rash  Psych: no depression  Endocrine: no polyuria, polydipsia  Hem/lymph: no swelling  Osteoporosis: no fractures    ALL OTHER SYSTEMS REVIEWED AND NEGATIVE      PHYSICAL EXAM:  VITALS: T(C): 37 (09-01-20 @ 05:32)  T(F): 98.6 (09-01-20 @ 05:32), Max: 101.3 (08-31-20 @ 20:00)  HR: 92 (09-01-20 @ 05:32) (87 - 107)  BP: 117/72 (09-01-20 @ 05:32) (108/59 - 139/92)  RR:  (10 - 29)  SpO2:  (94% - 100%)  Wt(kg): --  GENERAL: NAD, well-groomed, well-developed  EYES: No proptosis, no lid lag, anicteric  HEENT:  Atraumatic, Normocephalic, moist mucous membranes  RESPIRATORY: Clear to auscultation bilaterally; No rales, rhonchi, wheezing, or rubs  CARDIOVASCULAR: Regular rate and rhythm; No murmurs; no peripheral edema  GI: Soft, nontender, non distended, normal bowel sounds  PSYCH: AAO X 3  NEURO: No Tremors, normal reflexes      POCT Blood Glucose.: 81 mg/dL (08-31-20 @ 19:09)  POCT Blood Glucose.: 83 mg/dL (08-31-20 @ 18:03)  POCT Blood Glucose.: 85 mg/dL (08-31-20 @ 17:05)  POCT Blood Glucose.: 88 mg/dL (08-31-20 @ 16:05)  POCT Blood Glucose.: 104 mg/dL (08-31-20 @ 14:59)  POCT Blood Glucose.: 92 mg/dL (08-31-20 @ 14:17)  POCT Blood Glucose.: 72 mg/dL (08-31-20 @ 13:05)  POCT Blood Glucose.: 96 mg/dL (08-31-20 @ 12:07)  POCT Blood Glucose.: 87 mg/dL (08-31-20 @ 11:14)  POCT Blood Glucose.: 73 mg/dL (08-31-20 @ 10:06)  POCT Blood Glucose.: 118 mg/dL (08-31-20 @ 08:56)  POCT Blood Glucose.: 65 mg/dL (08-31-20 @ 08:17)  POCT Blood Glucose.: 48 mg/dL (08-31-20 @ 08:14)  POCT Blood Glucose.: 88 mg/dL (08-31-20 @ 06:56)  POCT Blood Glucose.: 90 mg/dL (08-31-20 @ 06:08)  POCT Blood Glucose.: 85 mg/dL (08-31-20 @ 04:58)  POCT Blood Glucose.: 90 mg/dL (08-31-20 @ 04:11)  POCT Blood Glucose.: 82 mg/dL (08-31-20 @ 03:04)  POCT Blood Glucose.: 111 mg/dL (08-31-20 @ 02:04)  POCT Blood Glucose.: 85 mg/dL (08-31-20 @ 01:07)  POCT Blood Glucose.: 85 mg/dL (08-31-20 @ 00:06)  POCT Blood Glucose.: 74 mg/dL (08-30-20 @ 23:15)  POCT Blood Glucose.: 138 mg/dL (08-30-20 @ 21:50)  POCT Blood Glucose.: 162 mg/dL (08-30-20 @ 20:53)  POCT Blood Glucose.: 120 mg/dL (08-30-20 @ 20:09)  POCT Blood Glucose.: 80 mg/dL (08-30-20 @ 16:41)  POCT Blood Glucose.: 102 mg/dL (08-30-20 @ 15:33)  POCT Blood Glucose.: 127 mg/dL (08-30-20 @ 14:38)                            13.2   10.54 )-----------( 78       ( 01 Sep 2020 02:35 )             40.7       09-01    132<L>  |  96<L>  |  4<L>  ----------------------------<  92  4.3   |  21<L>  |  0.74    EGFR if : 124  EGFR if non : 107    Ca    9.0      09-01  Mg     1.8     09-01  Phos  2.4     09-01    TPro  6.6  /  Alb  3.0<L>  /  TBili  2.1<H>  /  DBili  x   /  AST  34  /  ALT  50<H>  /  AlkPhos  67  09-01      Thyroid Function Tests:  09-01 @ 02:35 TSH 1.57 FreeT4 -- T3 -- Anti TPO -- Anti Thyroglobulin Ab -- TSI --          09-01 Chol 236<H>  HDL 30<L> Trig 368<H>, 08-31 Chol -- LDL -- HDL -- Trig 404<H>, 08-31 Chol -- LDL -- HDL -- Trig 481<H>, 08-31 Chol -- LDL -- HDL -- Trig 1114<H>, 08-31 Chol -- LDL -- HDL -- Trig 1697<H>, 08-30 Chol -- LDL -- HDL -- Trig 2334<H>, 08-30 Chol -- LDL -- HDL -- Trig 3801<H>

## 2020-09-01 NOTE — DISCHARGE NOTE PROVIDER - HOSPITAL COURSE
50M PMH HLD, pancreatitis (one prior episode) presented to ED with epigastric pain, N/V 2/2 pancreatitis 2/2 hypertriglyceridemia. Admitted to MICU 8/30. Initial labs significant for TG 3801, lipase 774.8, hyponatremia 122. S/p insulin gtt (8/30-8/31) and IVF (NS bolus, D51/2NS, D10LR), TG downtrended to 400s, Na to 130s. Patient also with persistent leukocytosis, most likely reactive 2/2 pancreatitis, BCx 8/30 x 2 NGTD. BCx 8/31 x 1 also sent after patient febrile to 101.3 8/31 PM. [facility] endocrine consult recommended ISS, but HbA1C 4.5 and FS in 80s, decided not to start ISS. Pain regimen PRN acetaminophen, Dilaudid. On 9/1 patient stable for transfer to floor. 50M PMH HLD, pancreatitis (one prior episode) presented to ED with epigastric pain, N/V 2/2 pancreatitis 2/2 hypertriglyceridemia. Admitted to MICU 8/30. Initial labs significant for TG 3801, lipase 774.8, hyponatremia 122. S/p insulin gtt (8/30-8/31) and IVF (NS bolus, D51/2NS, D10LR), TG downtrended to 400s, Na to 130s. Patient also with persistent leukocytosis, most likely reactive 2/2 pancreatitis, BCx 8/30 x 2 NGTD. BCx 8/31 x 1 also sent after patient febrile to 101.3 8/31 PM. [facility] endocrine consult recommended ISS, but HbA1C 4.5 and FS in 80s, decided not to start ISS. Pain regimen PRN acetaminophen, Dilaudid. On 9/1 patient stable for transfer to floor. TG continued to downtrend over admission, to 296 on 9/2 with abd pain better managed. Pt seen and deemed HD stable for discharge on 9/2. 50M PMH HLD, pancreatitis (one prior episode) presented to ED with epigastric pain, N/V 2/2 pancreatitis 2/2 hypertriglyceridemia. Admitted to MICU 8/30. Initial labs significant for TG 3801, lipase 774.8, hyponatremia 122. S/p insulin gtt (8/30-8/31) and IVF (NS bolus, D51/2NS, D10LR), TG downtrended to 400s, Na to 130s. Patient also with persistent leukocytosis, most likely reactive 2/2 pancreatitis, BCx 8/30 x 2 NGTD. BCx 8/31 x 1 also sent after patient febrile to 101.3 8/31 PM. [facility] endocrine consult recommended ISS, but HbA1C 4.5 and FS in 80s, decided not to start ISS. Pain regimen PRN acetaminophen, Dilaudid. On 9/1 patient stable for transfer to floor. TG continued to downtrend over admission, to 296 on 9/2 with abd pain better managed. Pt seen and deemed HD stable for discharge on 9/2. .

## 2020-09-01 NOTE — PROGRESS NOTE ADULT - PROBLEM SELECTOR PLAN 1
Improving. 2/2 hyperTG with possible component of EtOH use (last drink 8/27). TG downtrending, no signs of EtOH withdrawal. S/p insulin gtt (8/30-8/31) and IVF (NS bolus, D51/2NS, D10LR)  - pain management: Dilaudid 2mg PO q6h, tylenol 650 mg PO q6h  - f/u TG Improving. 2/2 hyperTG with possible component of EtOH use (last drink 8/27). TG downtrending, no signs of EtOH withdrawal. S/p insulin gtt (8/30-8/31) and IVF (NS bolus, D51/2NS, D10LR)  - pain management: Dilaudid 1mg PO q6h, tylenol 650 mg PO q6h  - f/u TG in am  - increase fenofibrate to 145 mg qd  - start lovaza 2 g BID  - f/u outpt with endo for TG recheck

## 2020-09-01 NOTE — CHART NOTE - NSCHARTNOTEFT_GEN_A_CORE
MAR Accept Note  Transfer to:    Accepting Attending Physician:    Assigned Room:      Patient seen and examined.   Labs and data reviewed.   No findings precluding transfer of service.       HPI/MICU COURSE:   Please refer to MICU transfer note for full details. Briefly, this is a 51yo M with PMHx of hypertriglyceridemia and pancreatitis who presented with abdominal pain 2/2 to hypertriglyceridemia. Pt was accepted to the MICU for an insulin drip. His triglycerides dropped to 400 and is medically stable to go to the floors. Pt does endorse drinking alcohol and has not had a drink in 72 hours.      FOR FOLLOW-UP:    F/U triglycerides in AM. Continue pain management. Instate CIWA if patient develops symptoms.    Jose Beard  EM/IM  PGY-3

## 2020-09-01 NOTE — CHART NOTE - NSCHARTNOTEFT_GEN_A_CORE
This report was requested by: Chuck Vidal | Reference #: [id number]        Others' Prescriptions        Patient Name: Prieto Beach     YOB: 1970     Address: [address] [place] [address]     Sex: Male       07/20/2020 07/20/2020 oxycodone-acetaminophen 5-325 mg tab  9 3 [id number]       No results from other states.

## 2020-09-01 NOTE — CHART NOTE - NSCHARTNOTEFT_GEN_A_CORE
MICU Transfer Note    Transfer from: MICU  Transfer to:  (X) Medicine    (  ) Telemetry    (  ) RCU    (  ) Palliative    (  ) Stroke Unit    (  ) _______________    Accepting physician:    MICU COURSE:  50M PMH HLD, pancreatitis (one prior episode) presented to ED with epigastric pain, N/V 2/2 pancreatitis 2/2 hypertriglyceridemia. Admitted to MICU 8/30. Initial labs significant for TG 3801, lipase 774.8, hyponatremia 122. S/p insulin gtt (8/30-8/31) and IVF (NS bolus, D51/2NS, D10LR), TG downtrended to 400s, Na to 130s. Patient also with persistent leukocytosis, most likely reactive 2/2 pancreatitis, BCx 8/30 x 2 NGTD. BCx 8/31 x 1 also sent after patient febrile to 101.3 8/31 PM. Carrollbside endocrine consult recommended ISS, but HbA1C 4.5 and FS in 80s, decided not to start ISS. Pain regimen PRN acetaminophen, Dilaudid. On 9/1 patient stable for transfer to floor.    ASSESSMENT & PLAN:   50M with PMH of HLD and pancreatitis admitted for pancreatitis 2/2 hypertriglyceridemia.     #Neuro  - No active issues    #Cardiovascular  - Hypertensive on presentation likely 2/2 pain  - Blood pressures controlled in the MICU  - c/w home med simvastatin 20 mg    #Respiratory  - No acute issues  - SaO2 100% on room air    #GI/Nutrition  - Elevated TGs at presentation  - Diet advanced DASH/TLC 8/31  - mild transaminitis improved (AST nl, ALT 52), continue to monitor    #Renal  - No active issues  - Monitor I/Os     #ID  LEUKOCYTOSIS  - persistent since admission  - UA negative, BCx 8/30 x 2 NGTD  - febrile 8/31 evening, BCx x 1 sent  - f/u 8/31 BCx  - observe off ABx    #Endocrine   - TGs downtrended, FS 80s  - D/c insulin gtt, D10 1/2NS  - curbside consulted endocrine  - hold off on ISS; FS in 80s, HbA1C 4.5    #Hematologic/DVT ppx  - Lovenox    For Follow-Up:  [ ] reconsult endocrine if needed  [ ] pain control: PRN PO Tylenol and Dilaudid    Vital Signs Last 24 Hrs  T(C): 37.4 (01 Sep 2020 00:00), Max: 38.5 (31 Aug 2020 20:00)  T(F): 99.3 (01 Sep 2020 00:00), Max: 101.3 (31 Aug 2020 20:00)  HR: 91 (01 Sep 2020 00:00) (87 - 107)  BP: 109/74 (01 Sep 2020 00:00) (108/59 - 139/92)  BP(mean): 84 (01 Sep 2020 00:00) (75 - 105)  RR: 20 (01 Sep 2020 00:00) (10 - 26)  SpO2: 97% (01 Sep 2020 00:00) (94% - 100%)  I&O's Summary    30 Aug 2020 07:01  -  31 Aug 2020 07:00  --------------------------------------------------------  IN: 1874 mL / OUT: 300 mL / NET: 1574 mL    31 Aug 2020 07:01  -  01 Sep 2020 00:42  --------------------------------------------------------  IN: 1556 mL / OUT: 950 mL / NET: 606 mL    MEDICATIONS  (STANDING):  atorvastatin 40 milliGRAM(s) Oral at bedtime  enoxaparin Injectable 40 milliGRAM(s) SubCutaneous daily  fenofibrate Tablet 48 milliGRAM(s) Oral daily    MEDICATIONS  (PRN):  acetaminophen   Tablet .. 650 milliGRAM(s) Oral every 6 hours PRN Mild Pain (1 - 3), Moderate Pain (4 - 6)  HYDROmorphone   Tablet 2 milliGRAM(s) Oral every 6 hours PRN Severe Pain (7 - 10)      LABS                                            13.3                  Neurophils% (auto):   78.8   (08-31 @ 21:00):    11.84)-----------(78           Lymphocytes% (auto):  15.5                                          39.8                   Eosinphils% (auto):   0.7      Manual%: Neutrophils x    ; Lymphocytes x    ; Eosinophils x    ; Bands%: x    ; Blasts x                                    131    |  97     |  4                   Calcium: 8.8   / iCa: x      (08-31 @ 21:00)    ----------------------------<  104       Magnesium: 1.7                              4.6     |  24     |  0.84             Phosphorous: 2.1      TPro  6.6    /  Alb  3.2    /  TBili  1.9    /  DBili  x      /  AST  35     /  ALT  52     /  AlkPhos  67     31 Aug 2020 21:00 MICU Transfer Note    Transfer from: MICU  Transfer to:  (X) Medicine    (  ) Telemetry    (  ) RCU    (  ) Palliative    (  ) Stroke Unit    (  ) _______________    Accepting physician: Dr. Suarez    MICU COURSE:  50M PMH HLD, pancreatitis (one prior episode) presented to ED with epigastric pain, N/V 2/2 pancreatitis 2/2 hypertriglyceridemia. Admitted to MICU 8/30. Initial labs significant for TG 3801, lipase 774.8, hyponatremia 122. S/p insulin gtt (8/30-8/31) and IVF (NS bolus, D51/2NS, D10LR), TG downtrended to 400s, Na to 130s. Patient also with persistent leukocytosis, most likely reactive 2/2 pancreatitis, BCx 8/30 x 2 NGTD. BCx 8/31 x 1 also sent after patient febrile to 101.3 8/31 PM. Curbside endocrine consult recommended ISS, but HbA1C 4.5 and FS in 80s, decided not to start ISS. Pain regimen PRN acetaminophen, Dilaudid. On 9/1 patient stable for transfer to floor.    ASSESSMENT & PLAN:   50M with PMH of HLD and pancreatitis admitted for pancreatitis 2/2 hypertriglyceridemia.     #Neuro  - No active issues  - Not currently exhibiting s/s of EtOH withdrawal, last drink a few days before admission (1 beer, 1 cocktail)  - Continue to monitor    #Cardiovascular  - Hypertensive on presentation likely 2/2 pain  - Blood pressures controlled in the MICU    HLD  - f/u lipid panel  - c/w statin    #Respiratory  - No acute issues  - SaO2 100% on room air    #GI/Nutrition  - Elevated TGs at presentation  - Diet advanced DASH/TLC 8/31  - mild transaminitis improved (last AST WNL, ALT 52); continue to monitor    #Renal  - No active issues  - Monitor I/Os     #ID  LEUKOCYTOSIS  - persistent since admission  - UA negative, BCx 8/30 x 2 NGTD  - febrile 8/31 evening, BCx x 1 sent  - f/u 8/31 BCx  - observe off ABx    #Endocrine   - TGs downtrended, FS 80s  - D/c insulin gtt, D10 1/2NS  - curbside consulted endocrine  - hold off on ISS; FS in 80s, HbA1C 4.5    #Hematologic  MACROCYTOSIS  - f/u B12, TSH  - possibly 2/2 alcohol consumption; everyday drinker 1-2 drinks/day    #DVT ppx  - Lovenox    For Follow-Up:  [ ] f/u lipid panel, B12, TSH  [ ] reconsult endocrine if needed  [ ] pain control: PRN PO Tylenol and Dilaudid    Vital Signs Last 24 Hrs  T(C): 37.4 (01 Sep 2020 00:00), Max: 38.5 (31 Aug 2020 20:00)  T(F): 99.3 (01 Sep 2020 00:00), Max: 101.3 (31 Aug 2020 20:00)  HR: 91 (01 Sep 2020 00:00) (87 - 107)  BP: 109/74 (01 Sep 2020 00:00) (108/59 - 139/92)  BP(mean): 84 (01 Sep 2020 00:00) (75 - 105)  RR: 20 (01 Sep 2020 00:00) (10 - 26)  SpO2: 97% (01 Sep 2020 00:00) (94% - 100%)  I&O's Summary    30 Aug 2020 07:01  -  31 Aug 2020 07:00  --------------------------------------------------------  IN: 1874 mL / OUT: 300 mL / NET: 1574 mL    31 Aug 2020 07:01  -  01 Sep 2020 00:42  --------------------------------------------------------  IN: 1556 mL / OUT: 950 mL / NET: 606 mL    MEDICATIONS  (STANDING):  atorvastatin 40 milliGRAM(s) Oral at bedtime  enoxaparin Injectable 40 milliGRAM(s) SubCutaneous daily  fenofibrate Tablet 48 milliGRAM(s) Oral daily    MEDICATIONS  (PRN):  acetaminophen   Tablet .. 650 milliGRAM(s) Oral every 6 hours PRN Mild Pain (1 - 3), Moderate Pain (4 - 6)  HYDROmorphone   Tablet 2 milliGRAM(s) Oral every 6 hours PRN Severe Pain (7 - 10)      LABS                                            13.3                  Neurophils% (auto):   78.8   (08-31 @ 21:00):    11.84)-----------(78           Lymphocytes% (auto):  15.5                                          39.8                   Eosinphils% (auto):   0.7      Manual%: Neutrophils x    ; Lymphocytes x    ; Eosinophils x    ; Bands%: x    ; Blasts x                                    131    |  97     |  4                   Calcium: 8.8   / iCa: x      (08-31 @ 21:00)    ----------------------------<  104       Magnesium: 1.7                              4.6     |  24     |  0.84             Phosphorous: 2.1      TPro  6.6    /  Alb  3.2    /  TBili  1.9    /  DBili  x      /  AST  35     /  ALT  52     /  AlkPhos  67     31 Aug 2020 21:00 MICU Transfer Note    Transfer from: MICU  Transfer to:  (X) Medicine    (  ) Telemetry    (  ) RCU    (  ) Palliative    (  ) Stroke Unit    (  ) _______________    Accepting physician: Dr. Suarez    MICU COURSE:  50M PMH HLD, pancreatitis (one prior episode) presented to ED with epigastric pain, N/V 2/2 pancreatitis 2/2 hypertriglyceridemia. Admitted to MICU 8/30. Initial labs significant for TG 3801, lipase 774.8, hyponatremia 122. S/p insulin gtt (8/30-8/31) and IVF (NS bolus, D51/2NS, D10LR), TG downtrended to 400s, Na to 130s. Patient also with persistent leukocytosis, most likely reactive 2/2 pancreatitis, BCx 8/30 x 2 NGTD. BCx 8/31 x 1 also sent after patient febrile to 101.3 8/31 PM. Curbside endocrine consult recommended ISS, but HbA1C 4.5 and FS in 80s, decided not to start ISS. Pain regimen PRN acetaminophen, Dilaudid. On 9/1 patient stable for transfer to floor.    ASSESSMENT & PLAN:   50M with PMH of HLD and pancreatitis admitted for pancreatitis 2/2 hypertriglyceridemia.     #Neuro  - No active issues  - not currently exhibiting s/s of EtOH withdrawal, last drink a few days before admission (1 beer, 1 cocktail)  - Continue to monitor    #Cardiovascular  - Hypertensive on presentation likely 2/2 pain  - Blood pressures controlled in the MICU    HLD  - f/u lipid panel  - c/w statin    #Respiratory  - No acute issues  - SaO2 100% on room air    #GI/Nutrition  - Elevated TGs at presentation, downtrended s/p insulin gtt  - diet advanced DASH/TLC 8/31  - mild transaminitis improved (last AST WNL, ALT 52); continue to monitor    #Renal  - No active issues  - Monitor I/Os     #ID  LEUKOCYTOSIS  - persistent since admission  - UA negative, BCx 8/30 x 2 NGTD  - febrile 8/31 evening, BCx x 1 sent  - f/u 8/31 BCx  - observe off ABx    #Endocrine   - TGs downtrended, FS 80s  - D/c insulin gtt, D10 1/2NS  - curbsided endocrine  - hold off on ISS; FS in 80s, HbA1C 4.5    #Hematologic  MACROCYTOSIS  - f/u B12, TSH  - possibly 2/2 alcohol consumption; everyday drinker 1-2 drinks/day    #DVT ppx  - Lovenox    For Follow-Up:  [ ] f/u lipid panel, B12, TSH  [ ] reconsult endocrine if needed  [ ] pain control: PRN PO Tylenol and Dilaudid    Vital Signs Last 24 Hrs  T(C): 37.4 (01 Sep 2020 00:00), Max: 38.5 (31 Aug 2020 20:00)  T(F): 99.3 (01 Sep 2020 00:00), Max: 101.3 (31 Aug 2020 20:00)  HR: 91 (01 Sep 2020 00:00) (87 - 107)  BP: 109/74 (01 Sep 2020 00:00) (108/59 - 139/92)  BP(mean): 84 (01 Sep 2020 00:00) (75 - 105)  RR: 20 (01 Sep 2020 00:00) (10 - 26)  SpO2: 97% (01 Sep 2020 00:00) (94% - 100%)  I&O's Summary    30 Aug 2020 07:01  -  31 Aug 2020 07:00  --------------------------------------------------------  IN: 1874 mL / OUT: 300 mL / NET: 1574 mL    31 Aug 2020 07:01  -  01 Sep 2020 00:42  --------------------------------------------------------  IN: 1556 mL / OUT: 950 mL / NET: 606 mL    MEDICATIONS  (STANDING):  atorvastatin 40 milliGRAM(s) Oral at bedtime  enoxaparin Injectable 40 milliGRAM(s) SubCutaneous daily  fenofibrate Tablet 48 milliGRAM(s) Oral daily    MEDICATIONS  (PRN):  acetaminophen   Tablet .. 650 milliGRAM(s) Oral every 6 hours PRN Mild Pain (1 - 3), Moderate Pain (4 - 6)  HYDROmorphone   Tablet 2 milliGRAM(s) Oral every 6 hours PRN Severe Pain (7 - 10)      LABS                                            13.3                  Neurophils% (auto):   78.8   (08-31 @ 21:00):    11.84)-----------(78           Lymphocytes% (auto):  15.5                                          39.8                   Eosinphils% (auto):   0.7      Manual%: Neutrophils x    ; Lymphocytes x    ; Eosinophils x    ; Bands%: x    ; Blasts x                                    131    |  97     |  4                   Calcium: 8.8   / iCa: x      (08-31 @ 21:00)    ----------------------------<  104       Magnesium: 1.7                              4.6     |  24     |  0.84             Phosphorous: 2.1      TPro  6.6    /  Alb  3.2    /  TBili  1.9    /  DBili  x      /  AST  35     /  ALT  52     /  AlkPhos  67     31 Aug 2020 21:00

## 2020-09-01 NOTE — DISCHARGE NOTE PROVIDER - CARE PROVIDER_API CALL
ALBERTO RIOS  Internal Medicine  [address] [place] [address]  Phone: [phone]  Fax: [phone]  Established Patient  Follow Up Time: 2 weeks

## 2020-09-01 NOTE — CONSULT NOTE ADULT - PROBLEM SELECTOR RECOMMENDATION 2
Patient counselled on dietary modication and avoiding ETOH use to prevent recurrance of pancreatitis Patient counselled on dietary modification and avoiding ETOH use to prevent recurrence of pancreatitis

## 2020-09-01 NOTE — DISCHARGE NOTE PROVIDER - NSDCMRMEDTOKEN_GEN_ALL_CORE_FT
folic acid 1 mg oral tablet: 1 tab(s) orally once a day  Multiple Vitamins oral tablet: 1 tab(s) orally once a day  ondansetron 4 mg oral tablet: 1 tab(s) orally 3 times a day, As Needed -for nausea   oxycodone-acetaminophen 5 mg-325 mg oral tablet: 1 tab(s) orally 3 times a day, As Needed -for severe pain MDD:3   simvastatin 20 mg oral tablet: 1 tab(s) orally once a day (at bedtime)  thiamine 100 mg oral tablet: 1 tab(s) orally once a day  traMADol 50 mg oral tablet: 25 milligram(s) orally 2 times a day, As Needed -for moderate pain MDD:MDD 50 mg atorvastatin 40 mg oral tablet: 1 tab(s) orally once a day (at bedtime)  fenofibrate 145 mg oral tablet: 1 tab(s) orally once a day  folic acid 1 mg oral tablet: 1 tab(s) orally once a day  Multiple Vitamins oral tablet: 1 tab(s) orally once a day  omega-3 polyunsaturated fatty acids ethyl esters 1000 mg oral capsule: 2 cap(s) orally 2 times a day  oxycodone-acetaminophen 5 mg-300 mg oral tablet: 1 tab(s) orally every 6 hours, As Needed -for severe pain MDD:4 tabs   thiamine 100 mg oral tablet: 1 tab(s) orally once a day  traMADol 50 mg oral tablet: 25 milligram(s) orally 2 times a day, As Needed -for moderate pain MDD:MDD 50 mg

## 2020-09-01 NOTE — CONSULT NOTE ADULT - ATTENDING COMMENTS
Joan Martínez (pager [phone])  On evenings and weekends, please call [phone] or page endocrine fellow on call.   Please note that this patient may be followed by different provider tomorrow. If no answer, contact endocrine fellow on call.

## 2020-09-01 NOTE — PROGRESS NOTE ADULT - PROBLEM SELECTOR PLAN 4
DVT ppx: lovenox  Diet: DASH Improved, currently normotensive.  - c/w Likely 2/2 bone marrow suppression 2/2 EtOH use. HD stable. No hemolysis noted on labs, no source of bleeding identified on exam.   - CTM

## 2020-09-01 NOTE — PROGRESS NOTE ADULT - PROBLEM SELECTOR PLAN 2
Improving  - c/w atorvastatin 40 mg qhs  - c/w fenofibrate 48 mg qd +fever, tachypnea. Persistent since admission- no identifiable cause. Leukocytosis likely reactive 2/2 pancreatitis. UA, BCx 8/30 x 2 NGTD. Febrile to 101.3 8/31 @2000.   - f/u bc  - Observe off Abx

## 2020-09-02 ENCOUNTER — TRANSCRIPTION ENCOUNTER (OUTPATIENT)
Age: 50
End: 2020-09-02

## 2020-09-02 VITALS
RESPIRATION RATE: 17 BRPM | HEART RATE: 73 BPM | OXYGEN SATURATION: 98 % | DIASTOLIC BLOOD PRESSURE: 82 MMHG | TEMPERATURE: 98 F | SYSTOLIC BLOOD PRESSURE: 121 MMHG

## 2020-09-02 DIAGNOSIS — I10 ESSENTIAL (PRIMARY) HYPERTENSION: ICD-10-CM

## 2020-09-02 DIAGNOSIS — E87.1 HYPO-OSMOLALITY AND HYPONATREMIA: ICD-10-CM

## 2020-09-02 DIAGNOSIS — Z02.9 ENCOUNTER FOR ADMINISTRATIVE EXAMINATIONS, UNSPECIFIED: ICD-10-CM

## 2020-09-02 LAB
ALBUMIN SERPL ELPH-MCNC: 3.1 G/DL — LOW (ref 3.3–5)
ALP SERPL-CCNC: 64 U/L — SIGNIFICANT CHANGE UP (ref 40–120)
ALT FLD-CCNC: 39 U/L — SIGNIFICANT CHANGE UP (ref 4–41)
ANION GAP SERPL CALC-SCNC: 10 MMO/L — SIGNIFICANT CHANGE UP (ref 7–14)
AST SERPL-CCNC: 30 U/L — SIGNIFICANT CHANGE UP (ref 4–40)
BILIRUB SERPL-MCNC: 1.7 MG/DL — HIGH (ref 0.2–1.2)
BUN SERPL-MCNC: 7 MG/DL — SIGNIFICANT CHANGE UP (ref 7–23)
CALCIUM SERPL-MCNC: 9.1 MG/DL — SIGNIFICANT CHANGE UP (ref 8.4–10.5)
CHLORIDE SERPL-SCNC: 95 MMOL/L — LOW (ref 98–107)
CO2 SERPL-SCNC: 26 MMOL/L — SIGNIFICANT CHANGE UP (ref 22–31)
CREAT SERPL-MCNC: 0.74 MG/DL — SIGNIFICANT CHANGE UP (ref 0.5–1.3)
GLUCOSE SERPL-MCNC: 94 MG/DL — SIGNIFICANT CHANGE UP (ref 70–99)
HCT VFR BLD CALC: 39.3 % — SIGNIFICANT CHANGE UP (ref 39–50)
HGB BLD-MCNC: 12.4 G/DL — LOW (ref 13–17)
MAGNESIUM SERPL-MCNC: 2.1 MG/DL — SIGNIFICANT CHANGE UP (ref 1.6–2.6)
MCHC RBC-ENTMCNC: 31.6 % — LOW (ref 32–36)
MCHC RBC-ENTMCNC: 33 PG — SIGNIFICANT CHANGE UP (ref 27–34)
MCV RBC AUTO: 104.5 FL — HIGH (ref 80–100)
NRBC # FLD: 0 K/UL — SIGNIFICANT CHANGE UP (ref 0–0)
PHOSPHATE SERPL-MCNC: 3 MG/DL — SIGNIFICANT CHANGE UP (ref 2.5–4.5)
PLATELET # BLD AUTO: 96 K/UL — LOW (ref 150–400)
PMV BLD: 12.5 FL — SIGNIFICANT CHANGE UP (ref 7–13)
POTASSIUM SERPL-MCNC: 4.2 MMOL/L — SIGNIFICANT CHANGE UP (ref 3.5–5.3)
POTASSIUM SERPL-SCNC: 4.2 MMOL/L — SIGNIFICANT CHANGE UP (ref 3.5–5.3)
PROT SERPL-MCNC: 6.6 G/DL — SIGNIFICANT CHANGE UP (ref 6–8.3)
RBC # BLD: 3.76 M/UL — LOW (ref 4.2–5.8)
RBC # FLD: 12.6 % — SIGNIFICANT CHANGE UP (ref 10.3–14.5)
SARS-COV-2 IGG SERPL QL IA: NEGATIVE — SIGNIFICANT CHANGE UP
SARS-COV-2 IGM SERPL IA-ACNC: <0.1 INDEX — SIGNIFICANT CHANGE UP
SODIUM SERPL-SCNC: 131 MMOL/L — LOW (ref 135–145)
TRIGL SERPL-MCNC: 296 MG/DL — HIGH (ref 10–149)
WBC # BLD: 7.45 K/UL — SIGNIFICANT CHANGE UP (ref 3.8–10.5)
WBC # FLD AUTO: 7.45 K/UL — SIGNIFICANT CHANGE UP (ref 3.8–10.5)

## 2020-09-02 PROCEDURE — 99232 SBSQ HOSP IP/OBS MODERATE 35: CPT

## 2020-09-02 PROCEDURE — 99239 HOSP IP/OBS DSCHRG MGMT >30: CPT | Mod: GC

## 2020-09-02 RX ORDER — SENNA PLUS 8.6 MG/1
2 TABLET ORAL DAILY
Refills: 0 | Status: DISCONTINUED | OUTPATIENT
Start: 2020-09-02 | End: 2020-09-02

## 2020-09-02 RX ORDER — OMEGA-3 ACID ETHYL ESTERS 1 G
2 CAPSULE ORAL
Qty: 0 | Refills: 0 | DISCHARGE
Start: 2020-09-02

## 2020-09-02 RX ORDER — POLYETHYLENE GLYCOL 3350 17 G/17G
17 POWDER, FOR SOLUTION ORAL
Refills: 0 | Status: DISCONTINUED | OUTPATIENT
Start: 2020-09-02 | End: 2020-09-02

## 2020-09-02 RX ORDER — ATORVASTATIN CALCIUM 80 MG/1
1 TABLET, FILM COATED ORAL
Qty: 30 | Refills: 0
Start: 2020-09-02 | End: 2020-10-01

## 2020-09-02 RX ORDER — FENOFIBRATE,MICRONIZED 130 MG
1 CAPSULE ORAL
Qty: 0 | Refills: 0 | DISCHARGE
Start: 2020-09-02

## 2020-09-02 RX ORDER — SIMVASTATIN 20 MG/1
1 TABLET, FILM COATED ORAL
Qty: 0 | Refills: 0 | DISCHARGE

## 2020-09-02 RX ORDER — FENOFIBRATE,MICRONIZED 130 MG
1 CAPSULE ORAL
Qty: 30 | Refills: 0
Start: 2020-09-02 | End: 2020-10-01

## 2020-09-02 RX ORDER — SENNA PLUS 8.6 MG/1
2 TABLET ORAL AT BEDTIME
Refills: 0 | Status: DISCONTINUED | OUTPATIENT
Start: 2020-09-02 | End: 2020-09-02

## 2020-09-02 RX ORDER — OMEGA-3 ACID ETHYL ESTERS 1 G
2 CAPSULE ORAL
Qty: 120 | Refills: 0
Start: 2020-09-02 | End: 2020-10-01

## 2020-09-02 RX ADMIN — Medication 1 PACKET(S): at 05:39

## 2020-09-02 RX ADMIN — HYDROMORPHONE HYDROCHLORIDE 1 MILLIGRAM(S): 2 INJECTION INTRAMUSCULAR; INTRAVENOUS; SUBCUTANEOUS at 09:53

## 2020-09-02 RX ADMIN — Medication 2 GRAM(S): at 05:39

## 2020-09-02 RX ADMIN — SENNA PLUS 2 TABLET(S): 8.6 TABLET ORAL at 11:26

## 2020-09-02 RX ADMIN — POLYETHYLENE GLYCOL 3350 17 GRAM(S): 17 POWDER, FOR SOLUTION ORAL at 11:26

## 2020-09-02 RX ADMIN — HYDROMORPHONE HYDROCHLORIDE 1 MILLIGRAM(S): 2 INJECTION INTRAMUSCULAR; INTRAVENOUS; SUBCUTANEOUS at 09:23

## 2020-09-02 RX ADMIN — ENOXAPARIN SODIUM 40 MILLIGRAM(S): 100 INJECTION SUBCUTANEOUS at 09:23

## 2020-09-02 RX ADMIN — Medication 145 MILLIGRAM(S): at 09:23

## 2020-09-02 NOTE — DISCHARGE NOTE NURSING/CASE MANAGEMENT/SOCIAL WORK - PATIENT PORTAL LINK FT
You can access the FollowMyHealth Patient Portal offered by [facility] by registering at the following website: [email or web address]. By joining [email or web address] FollowMyHealth portal, you will also be able to view your health information using other applications (apps) compatible with our system.

## 2020-09-02 NOTE — PROGRESS NOTE ADULT - PROBLEM SELECTOR PLAN 1
Improving. 2/2 hyperTG with possible component of EtOH use (last drink 8/27). TG downtrending, no signs of EtOH withdrawal. S/p insulin gtt (8/30-8/31) and IVF (NS bolus, D51/2NS, D10LR)  - pain management: Dilaudid 1mg PO q6h, tylenol 650 mg PO q6h  - f/u TG in am  - increase fenofibrate to 145 mg qd  - start lovaza 2 g BID  - f/u outpt with endo for TG recheck Improving. 2/2 hyperTG with possible component of EtOH use (last drink 8/27). TG downtrending, no signs of EtOH withdrawal. S/p insulin gtt (8/30-8/31) and IVF (NS bolus, D51/2NS, D10LR). TG downtrending  - pain management: Dilaudid 1mg PO q6h, tylenol 650 mg PO q6h  - c/w fenofibrate 145 mg qd  - c/w lovaza 2 g BID  - f/u outpt with endo for TG recheck

## 2020-09-02 NOTE — PROGRESS NOTE ADULT - ATTENDING COMMENTS
50y M PMHx of pancreatitis a/w acute pancreatitis in the setting of hypertriglyceridemia now s/p insulin ggt in the MICU. Pt also meeting SIRS criteria likely in the setting of his pancreatitis w/o localizing symptoms of infection.     Pt tolerating diet and abdominal pain overall improved. He has been afebrile for >24hrs    -Blood cxs NTD. Leukocytosis resolved. Monitoring off abx.   -Endo following. Will continue to appreciate recs. C/w lovaza 2mg BID and will increase fenofibrate to 145mg qd. C/w atorvastatin 40mg qd.   -serial abdominal exam   -c/w pain control  -noted thrombocytopenia w/o evidence of acute bleeding. Pt w/ intermittent thrombocytopenia based on prior labs. Improved today. Suspect hemodilutional component given fluids he was given for pancreatitis. Reported ETOH hx may also be playing a role. He is not anemic and low suspicion for MAHA process.     Dc planning for today. Management plan and follow up discussed. Further details outlined in discharge documentation. Spent 37 min in discharge planning and coordination.
50y M PMHx of pancreatitis a/w acute pancreatitis in the setting of hypertriglyceridemia now s/p insulin ggt in the MICU. Pt also meeting SIRS criteria likely in the setting of his pancreatitis w/o localizing symptoms of infection.     Pt tolerating diet but reporting continued abdominal pain. States current pain regimen dose helps when he gets it.     -Blood cxs NTD. Leukocytosis improving. Monitoring off abx.   -Endo following. Will continue to appreciate recs. Starting lovaza 2mg BID and will increase fenofibrate to 145mg qd. C/w atorvastatin 40mg qd.   -serial abdominal exam   -c/w pain control  -noted thrombocytopenia w/o evidence of acute bleeding. Pt w/ intermittent thrombocytopenia based on prior labs. Suspect hemodilutional component given fluids he was given for pancreatitis. Reported ETOH hx may also be playing a role. He is not anemic and low suspicion for MAHA process.     Dc planning pending clinical improvement of abdominal pain.

## 2020-09-02 NOTE — PROGRESS NOTE ADULT - ASSESSMENT
50y M PMHx of pancreatitis presenting with new episode of pancreatitis 2/2 hypertriglyceridemia.
50 yr old M with PMH of HLD here with HTG induced pancreatitis. Patient was treated in the MICU [facility] insulin gtt and D10. TG [id number]    Hypertriglyceridemia/HLD  Recommendation: -Patient was counselled to limit simple sugars, fatty foods as well as avoid ETOH use  - Continue atorvastatin 40mg daily,   - c/w fenofibrate to 145mg daily  - c/w Lovaza 2g BID  - Patient would benefit from nutritional consultation  - A1C and TSH WNL  - Patient referred to lipid center.  Lipid center calling patient to schedule appointment.  - Located [address] [place] [address]   [phone]          Other acute pancreatitis.    Recommendation: Patient counselled on dietary modification and avoiding ETOH use to prevent recurrence of pancreatitis.  - Trigs are downtrending  - referred to lipid center as above  - c/w pain management    HTN  - Target BP < 130/80  - Likely related to pain, continue to monitor  - consider low dose ACEi or ARB, if consistently above goal BP    Jeimy Lanier  Nurse Practitioner  Division of Endocrinology & Diabetes  Pager # [phone]      If after 6PM or before 9AM, or on weekends/holidays, please call endocrine answering service for assistance ([phone]).  For nonurgent matters email [email or web address] for assistance.
50y M PMHx of pancreatitis presenting with new episode of pancreatitis 2/2 hypertriglyceridemia.
51 yo M with hx of pancreatitis presenting with new episode of pancreatitis 2/2 hypertriglyceridemia.     #Neuro  - No active issues  - Not currently exhibiting s/s of EtOH withdrawal, last drink 3 days ago (1 beer, 1 cocktail)  - Will continue to monitor for possible withdrawal    #Cardiovascular  - Hypertensive on presentation likely 2/2 pain  - Blood pressures controlled in the MICU  - Will continue home med simvastatin 20 mg    #Respiratory  - No acute issues  - SaO2 100% on room air    #GI/Nutrition  - Elevated TGs at presentation  - Advancing diet as tolerated    #/Renal  - Monitor I/Os   - No EVITA but patient has progression of acidosis, will monitor   - Trend VBGs    #ID  - Leukocytosis likely reactive 2/2 pancreatitis    #Endocrine   - C/w FS q1  - FS continue to downtrend  - TGs continue to downtrend  - D/c insulin gtt  - D/c D10 1/2 NS   - Endo consulted, f/u recs    #Hematologic/DVT ppx  - Lovenox ppx    #Ethics  - Ongoing [facility]    Siobhan Moreira DO  EM PGY1  Pager: [phone]

## 2020-09-02 NOTE — PROGRESS NOTE ADULT - PROBLEM SELECTOR PLAN 3
Improving  - c/w atorvastatin 40 mg qhs  - increase fenofibrate to 145 mg qd Improving  - c/w atorvastatin 40 mg qhs  - c/w fenofibrate 145 mg qd

## 2020-09-02 NOTE — PROGRESS NOTE ADULT - PROBLEM SELECTOR PLAN 2
+fever, tachypnea. Persistent since admission- no identifiable cause. Leukocytosis likely reactive 2/2 pancreatitis. UA, BCx 8/30 x 2 NGTD. Febrile to 101.3 8/31 @2000.   - f/u bc  - Observe off Abx

## 2020-09-02 NOTE — SBIRT NOTE ADULT - NSSBIRTBRIEFINTDET_GEN_A_CORE
Pt receptive to engagement in consult and agreeable to reviewing healthy drinking guidelines. This writer provided pt with ARS/DARS Addiction Services at [facility]: [address], [address], [address] [place] [address] p: [phone], [facility] Crisis walk in clinic p:[phone], [facility] Treatment/Care Services for Substance Use List, and [facility] Tobacco Control Information: [address] [place] [address] , [place] [address] p: [phone], as pt notes he engages in a half pack of cigarettes weekly (increased with ETOH consumption). This writer additionally reviewed "Rethink Drinking" booklet from [employer] - [place] [employer] of [employer] and [employer].

## 2020-09-02 NOTE — PROGRESS NOTE ADULT - SUBJECTIVE AND OBJECTIVE BOX
Authored by Ben Martínez MD ([phone]) [facility]    Patient is a 50y old  Male who presents with a chief complaint of pancreatitis (02 Sep 2020 07:25)    SUBJECTIVE / OVERNIGHT EVENTS: NAEON. This am pt is awake and alert in bed, NAD. Notes abd pain but per chart review has been spacing out his dilaudid, which was transitioned to a low dose (1mg PO) on 9/1. Still has not had BM since 8/29 but notes that he "feels like he could have gone yesterday but I just didn't eat enough"    ADDITIONAL REVIEW OF SYSTEMS:  Denies HA, cp, SOB    MEDICATIONS  (STANDING):  atorvastatin 40 milliGRAM(s) Oral at bedtime  enoxaparin Injectable 40 milliGRAM(s) SubCutaneous daily  fenofibrate Tablet 145 milliGRAM(s) Oral daily  melatonin 3 milliGRAM(s) Oral at bedtime  omega-3-Acid Ethyl Esters 2 Gram(s) Oral two times a day    MEDICATIONS  (PRN):  acetaminophen   Tablet .. 650 milliGRAM(s) Oral every 6 hours PRN Mild Pain (1 - 3), Moderate Pain (4 - 6)  HYDROmorphone   Tablet 1 milliGRAM(s) Oral every 6 hours PRN Severe Pain (7 - 10)  polyethylene glycol 3350 17 Gram(s) Oral two times a day PRN Constipation  senna 2 Tablet(s) Oral at bedtime PRN Constipation    PHYSICAL EXAM:  Vital Signs Last 24 Hrs  T(C): 36.7 (02 Sep 2020 05:30), Max: 37.2 (01 Sep 2020 12:47)  T(F): 98 (02 Sep 2020 05:30), Max: 98.9 (01 Sep 2020 12:47)  HR: 73 (02 Sep 2020 05:30) (73 - 108)  BP: 121/82 (02 Sep 2020 05:30) (121/82 - 146/79)  BP(mean): --  RR: 17 (02 Sep 2020 05:30) (17 - 18)  SpO2: 98% (02 Sep 2020 05:30) (98% - 100%)    GENERAL: No acute distress, well-developed  HEAD:  Atraumatic, Normocephalic  EYES: EOMI, conjunctiva and sclera clear  NECK: Supple, no JVD  CHEST/LUNG: CTAB; No wheezes, rales, or rhonchi  HEART: Regular rate and rhythm; No murmurs, rubs, or gallops  ABDOMEN: +Midepigastric tenderness to palpation, mild tenderness at LLQ. Soft, non-distended; normal bowel sounds, no organomegaly  EXTREMITIES:  2+ peripheral pulses b/l, No clubbing, cyanosis, or edema  NEUROLOGY: A&O x 3, no focal deficits  SKIN: No rashes or lesions    LABS:                        12.4   7.45  )-----------( 96       ( 02 Sep 2020 06:25 )             39.3     09-02    131<L>  |  95<L>  |  7   ----------------------------<  94  4.2   |  26  |  0.74    Ca    9.1      02 Sep 2020 06:25  Phos  3.0     09-02  Mg     2.1     09-02    TPro  6.6  /  Alb  3.1<L>  /  TBili  1.7<H>  /  DBili  x   /  AST  30  /  ALT  39  /  AlkPhos  64  09-02    Culture - Blood (collected 31 Aug 2020 23:18)  Source: .Blood Blood-Peripheral  Preliminary Report (02 Sep 2020 01:02):    No growth to date.    Culture - Blood (collected 30 Aug 2020 21:23)  Source: .Blood Blood  Preliminary Report (31 Aug 2020 22:02):    No growth to date.    Culture - Blood (collected 30 Aug 2020 21:23)  Source: .Blood Blood  Preliminary Report (31 Aug 2020 22:02):    No growth to date. Authored by Ben Martínez MD ([phone]) [facility]    Patient is a 50y old  Male who presents with a chief complaint of pancreatitis (02 Sep 2020 07:25)    SUBJECTIVE / OVERNIGHT EVENTS: NAEON. This am pt is awake and alert in bed, NAD. Notes abd pain but per chart review has been spacing out his dilaudid, which was transitioned to a low dose (1mg PO) on 9/1. Still has not had BM since 8/29 but notes that he "feels like he could have gone yesterday but I just didn't eat enough".     ADDITIONAL REVIEW OF SYSTEMS:  Denies HA, cp, SOB    MEDICATIONS  (STANDING):  atorvastatin 40 milliGRAM(s) Oral at bedtime  enoxaparin Injectable 40 milliGRAM(s) SubCutaneous daily  fenofibrate Tablet 145 milliGRAM(s) Oral daily  melatonin 3 milliGRAM(s) Oral at bedtime  omega-3-Acid Ethyl Esters 2 Gram(s) Oral two times a day    MEDICATIONS  (PRN):  acetaminophen   Tablet .. 650 milliGRAM(s) Oral every 6 hours PRN Mild Pain (1 - 3), Moderate Pain (4 - 6)  HYDROmorphone   Tablet 1 milliGRAM(s) Oral every 6 hours PRN Severe Pain (7 - 10)  polyethylene glycol 3350 17 Gram(s) Oral two times a day PRN Constipation  senna 2 Tablet(s) Oral at bedtime PRN Constipation    PHYSICAL EXAM:  Vital Signs Last 24 Hrs  T(C): 36.7 (02 Sep 2020 05:30), Max: 37.2 (01 Sep 2020 12:47)  T(F): 98 (02 Sep 2020 05:30), Max: 98.9 (01 Sep 2020 12:47)  HR: 73 (02 Sep 2020 05:30) (73 - 108)  BP: 121/82 (02 Sep 2020 05:30) (121/82 - 146/79)  BP(mean): --  RR: 17 (02 Sep 2020 05:30) (17 - 18)  SpO2: 98% (02 Sep 2020 05:30) (98% - 100%)    GENERAL: No acute distress, well-developed  HEAD:  Atraumatic, Normocephalic  EYES: EOMI, conjunctiva and sclera clear  NECK: Supple, no JVD  CHEST/LUNG: CTAB; No wheezes, rales, or rhonchi  HEART: Regular rate and rhythm; No murmurs, rubs, or gallops  ABDOMEN: +Midepigastric tenderness to palpation, mild tenderness at LLQ. Soft, non-distended; normal bowel sounds, no organomegaly  EXTREMITIES:  2+ peripheral pulses b/l, No clubbing, cyanosis, or edema  NEUROLOGY: A&O x 3, no focal deficits  SKIN: No rashes or lesions    LABS:                        12.4   7.45  )-----------( 96       ( 02 Sep 2020 06:25 )             39.3     09-02    131<L>  |  95<L>  |  7   ----------------------------<  94  4.2   |  26  |  0.74    Ca    9.1      02 Sep 2020 06:25  Phos  3.0     09-02  Mg     2.1     09-02    TPro  6.6  /  Alb  3.1<L>  /  TBili  1.7<H>  /  DBili  x   /  AST  30  /  ALT  39  /  AlkPhos  64  09-02    Culture - Blood (collected 31 Aug 2020 23:18)  Source: .Blood Blood-Peripheral  Preliminary Report (02 Sep 2020 01:02):    No growth to date.    Culture - Blood (collected 30 Aug 2020 21:23)  Source: .Blood Blood  Preliminary Report (31 Aug 2020 22:02):    No growth to date.    Culture - Blood (collected 30 Aug 2020 21:23)  Source: .Blood Blood  Preliminary Report (31 Aug 2020 22:02):    No growth to date.

## 2020-09-02 NOTE — PROGRESS NOTE ADULT - PROBLEM SELECTOR PLAN 7
HD stable for discharge. Attempted to contact PCP Dr. Ewa Gomez on 9/2 to provide signout but unable to reach.

## 2020-09-02 NOTE — PROGRESS NOTE ADULT - PROBLEM SELECTOR PLAN 4
Likely 2/2 bone marrow suppression 2/2 EtOH use. HD stable. No hemolysis noted on labs, no source of bleeding identified on exam.   - CTM

## 2020-09-02 NOTE — PROGRESS NOTE ADULT - PROBLEM SELECTOR PLAN 5
DVT ppx: lovenox  Diet: DASH Likely pseudohyponatremia 2/2 hypertriglyceridemia vs dilutional 2/2 fluids tx for acute pancreatitis. Pt is asymptomatic, fluids stopped  - CTM  - Encourage PO intake

## 2020-09-02 NOTE — PROGRESS NOTE ADULT - SUBJECTIVE AND OBJECTIVE BOX
Chief Complaint: pancreatitis 2/2 hypertriglyceridemia    History:  denies n/v.  reports mild abdominal pain but is tolerating eating a small amount of food with each meal.      MEDICATIONS  (STANDING):  atorvastatin 40 milliGRAM(s) Oral at bedtime  enoxaparin Injectable 40 milliGRAM(s) SubCutaneous daily  fenofibrate Tablet 145 milliGRAM(s) Oral daily  melatonin 3 milliGRAM(s) Oral at bedtime  omega-3-Acid Ethyl Esters 2 Gram(s) Oral two times a day  polyethylene glycol 3350 17 Gram(s) Oral two times a day  senna 2 Tablet(s) Oral daily    MEDICATIONS  (PRN):  acetaminophen   Tablet .. 650 milliGRAM(s) Oral every 6 hours PRN Mild Pain (1 - 3), Moderate Pain (4 - 6)  HYDROmorphone   Tablet 1 milliGRAM(s) Oral every 6 hours PRN Severe Pain (7 - 10)      Allergies    No Known Allergies    Intolerances      Review of Systems:  Constitutional: No fever  Eyes: No blurry vision  Neuro: No tremors  HEENT: No pain  GI: reports mild abdominal pain    ALL OTHER SYSTEMS REVIEWED AND NEGATIVE      PHYSICAL EXAM:  VITALS: T(C): 36.7 (09-02-20 @ 05:30)  T(F): 98 (09-02-20 @ 05:30), Max: 98.8 (09-01-20 @ 21:21)  HR: 73 (09-02-20 @ 05:30) (73 - 108)  BP: 121/82 (09-02-20 @ 05:30) (121/82 - 146/79)  RR:  (17 - 18)  SpO2:  (98% - 100%)  Wt(kg): --  GENERAL: NAD, well-developed  EYES: No proptosis, no lid lag, anicteric  GI: Soft, +tenderness, non distended  PSYCH: Alert and oriented x 3, normal affect, normal mood      A1C with Estimated Average Glucose (08.31.20 @ 02:35)    A1C with Estimated Average Glucose: 4.5: High Risk (prediabetic)    5.7 - 6.4 %  Diabetic, diagnostic           > 6.5 %  ADA diabetic treatment goal    < 7.0 %    HbA1C values may not accurately reflect mean blood glucose  in patients with Hb variants.  Suggest clinical correlation. %    Lipid Profile in AM (09.01.20 @ 02:35)    Cholesterol, Serum: 236 mg/dL    Triglycerides, Serum: 368 mg/dL    HDL Cholesterol, Serum: 30 mg/dL    Direct LDL: 152:   RESULT (mg/dL)   (For adults 18 years and older)  ----------------------------------------------------------  Below 70         Ideal for people at very high risk of  heart disease  Below 100        Ideal for people at risk of heart disease  100 - 129        [place]  130 - 159        Borderline high  160 - 189        High  190 and above    Very high mg/dL          09-02    131<L>  |  95<L>  |  7   ----------------------------<  94  4.2   |  26  |  0.74    EGFR if : 124  EGFR if non : 107    Ca    9.1      09-02  Mg     2.1     09-02  Phos  3.0     09-02    TPro  6.6  /  Alb  3.1<L>  /  TBili  1.7<H>  /  DBili  x   /  AST  30  /  ALT  39  /  AlkPhos  64  09-02          Thyroid Function Tests:  09-01 @ 02:35 TSH 1.57 FreeT4 -- T3 -- Anti TPO -- Anti Thyroglobulin Ab -- TSI --

## 2020-09-02 NOTE — SBIRT NOTE ADULT - NSSBIRTALCPOSREINDET_GEN_A_CORE
Pt AAOx3, euthymic mood and congruent affect, speech clear and concise, behavior appropriate to this writer.   Full screen positive. Brief Intervention Performed. Passive Referral To Treatment Attempted. Screening results were reviewed with the patient and patient was provided information about healthy guidelines and potential negative consequences associated with level of risk. Motivation and readiness to reduce or stop use was discussed and goals and activities to make changes were suggested/offered. Options discussed for further evaluation and treatment, but referral to treatment was not completed because: Patient refused  Pt notes following his recent discussion with MD he will refrain from ETOH consumption as he realizes engagement has a direct link to his admitting dx. Pt declines interest in connecting to inpt/outpt/detox or AA meetings at this time but receptive to resources.

## 2020-09-04 ENCOUNTER — EMERGENCY (EMERGENCY)
Facility: HOSPITAL | Age: 50
LOS: 1 days | Discharge: HOME | End: 2020-09-04
Attending: EMERGENCY MEDICINE | Admitting: EMERGENCY MEDICINE
Payer: MEDICAID

## 2020-09-04 VITALS
HEART RATE: 80 BPM | TEMPERATURE: 97 F | HEIGHT: 67 IN | OXYGEN SATURATION: 100 % | SYSTOLIC BLOOD PRESSURE: 156 MMHG | DIASTOLIC BLOOD PRESSURE: 97 MMHG | RESPIRATION RATE: 16 BRPM

## 2020-09-04 LAB
CULTURE RESULTS: SIGNIFICANT CHANGE UP
CULTURE RESULTS: SIGNIFICANT CHANGE UP
SPECIMEN SOURCE: SIGNIFICANT CHANGE UP
SPECIMEN SOURCE: SIGNIFICANT CHANGE UP

## 2020-09-04 PROCEDURE — 99283 EMERGENCY DEPT VISIT LOW MDM: CPT

## 2020-09-04 NOTE — ED PROVIDER NOTE - PATIENT PORTAL LINK FT
You can access the FollowMyHealth Patient Portal offered by [facility] by registering at the following website: [email or web address]. By joining [facility]’s FollowMyHealth portal, you will also be able to view your health information using other applications (apps) compatible with our system.

## 2020-09-04 NOTE — ED ADULT TRIAGE NOTE - CHIEF COMPLAINT QUOTE
Pt c/o blurry vision b/l eyes since today, denies blurry vision at this time. Also c/o "feeling bad but I have no pain, also I lost 10lbs since Sunday." Reports discharged on Wed for pancreatitis. Denies n/v. PMHx htn, pancreatitis.

## 2020-09-04 NOTE — ED PROVIDER NOTE - OBJECTIVE STATEMENT
50 year old male with a pmhx of pancreatitis, presents to ED for evaluation of episode of blurry vision that has since resolved. Patient states he was reading and got blurry vision. He also felt sweaty at that time. Patient took a nap and symptoms resolved. He states he is worried his blood sugar is low because he got hypoglycemic while on an insulin drip during his hospitalization. Patient also notes a 10 lbs weight loss since he was admitted to the hospital. Patient denies any other symptoms. No fever, chills, cp, sob, cough, vomiting, or nausea.

## 2020-09-04 NOTE — ED PROVIDER NOTE - PROGRESS NOTE DETAILS
Aiden BHATTI: Pt signed out to me.  He is stable for dc home, provided with rx for glucometer/lancet/strips.  Plan for outpatient PMD follow-up.

## 2020-09-04 NOTE — ED PROVIDER NOTE - CLINICAL SUMMARY MEDICAL DECISION MAKING FREE TEXT BOX
[place]: H/o ETOH and TG-related pancreatitis. Dc'd from here recently. Noted he's unintentionally lost weight. Today, had blurry vision (B) and sweating. Check TSH, look to see if he's had abd and chest imaging to eval for CA, check HIV. Likely d/c. [place]: H/o ETOH and TG-related pancreatitis. Dc'd from here recently. Noted he's unintentionally lost weight. Today, had blurry vision (B) and sweating. TSH and CT abd recently neg. Check HIV, and PT/PTT (as indication of liver synthetic function, as today's blurry vision and sweating may have been 2/2 low BG 2/2 poor liver function 2/2 ETOH).

## 2020-09-04 NOTE — ED ADULT NURSE NOTE - OBJECTIVE STATEMENT
blurry vision napped felt better feel low energy abdomen diffusely tender same since d/cHTn compliant with meds pt received to room 18 ambulatory d/c 2 days ago with pancreatitis, c/o multiple medical complaints. pt denies these complaints at present. pt c/o blurry vision while reading, took a nap and woke up feeling better. PMH HTN compliant with meds denies dizziness, lightheadedness.  pt also low energy and intermittent diaphoresis while at rest, associated with a feeling of nervousness. abdomen diffusely tender same since d/c. admits to drinking everyday 2-3 beers per day prior to recent admission. denies hx alcohol withdrawals or seizures. awaiting MD cruz, handoff report given to primary RN Amanda.

## 2020-09-04 NOTE — ED PROVIDER NOTE - PHYSICAL EXAMINATION
General: well appearing, no acute distress, AOx3  Skin: normal color for race, no rash  Head: normocephalic, atraumatic  Eyes: clear conjunctiva, EOMI  ENMT: airway patent, no nasal discharge  Cardiovascular: normal rate, normal rhythm, S1/S2  Pulmonary: clear to auscultation bilaterally, no rales, rhonchi, or wheeze  Abdomen: soft, nontender, no guarding  Musculoskeletal: moving extremities well, no deformity  Neuro: CN II-XII intact, 5/5 strength b/l   Psych: normal mood, normal affect

## 2020-09-04 NOTE — ED PROVIDER NOTE - ATTENDING CONTRIBUTION TO CARE
I performed a face-to-face evaluation of the patient and performed a history and physical examination. I agree with the history and physical examination.    H/o ETOH and TG-related pancreatitis. Dc'd from here recently. Noted he's unintentionally lost weight. Today, had blurry vision (B) and sweating. Check TSH, look to see if he's had abd and chest imaging to eval for CA, check HIV. Likely d/c. I performed a face-to-face evaluation of the patient and performed a history and physical examination. I agree with the history and physical examination.    H/o ETOH and TG-related pancreatitis. Dc'd from here recently. Noted he's unintentionally lost weight. Today, had blurry vision (B) and sweating. TSH and CT abd recently neg. Check HIV, and PT/PTT (as indication of liver synthetic function, as today's blurry vision and sweating may have been 2/2 low BG 2/2 poor liver function 2/2 ETOH).

## 2020-09-04 NOTE — ED PROVIDER NOTE - NS ED ROS FT
General: no fever, no chills, +weight loss  Eyes: no vision changes, no eye pain, +blurry vision- now resolved  Cardiovascular: no chest pain, no edema  Respiratory: no cough, no shortness of breath  Gastrointestinal: no abdominal pain, no nausea, no vomiting, no diarrhea  Genitourinary: no dysuria, no hematuria  Musculoskeletal: no muscle pain, no joint pain  Skin: no rash, no lesions  Neuro: no numbness, no tingling  Psych: no depression, no anxiety

## 2020-09-05 VITALS
DIASTOLIC BLOOD PRESSURE: 92 MMHG | HEART RATE: 79 BPM | TEMPERATURE: 98 F | SYSTOLIC BLOOD PRESSURE: 127 MMHG | OXYGEN SATURATION: 100 % | RESPIRATION RATE: 16 BRPM

## 2020-09-05 LAB
APTT BLD: 32.8 SEC — SIGNIFICANT CHANGE UP (ref 27–36.3)
HIV COMBO RESULT: SIGNIFICANT CHANGE UP
HIV1+2 AB SPEC QL: SIGNIFICANT CHANGE UP
INR BLD: 1.16 — SIGNIFICANT CHANGE UP (ref 0.88–1.16)
PROTHROM AB SERPL-ACNC: 13.2 SEC — SIGNIFICANT CHANGE UP (ref 10.6–13.6)

## 2020-09-06 LAB
CULTURE RESULTS: SIGNIFICANT CHANGE UP
SPECIMEN SOURCE: SIGNIFICANT CHANGE UP

## 2020-09-11 PROBLEM — K85.90 PANCREATITIS: Status: ACTIVE | Noted: 2020-09-11

## 2020-09-11 PROBLEM — E78.00 HYPERCHOLESTEROLEMIA: Status: ACTIVE | Noted: 2020-09-11

## 2020-09-11 PROBLEM — E78.1 HYPERTRIGLYCERIDEMIA: Status: ACTIVE | Noted: 2020-09-11

## 2020-09-11 RX ORDER — OMEGA-3-ACID ETHYL ESTERS CAPSULES 1 G/1
1 CAPSULE, LIQUID FILLED ORAL
Refills: 0 | Status: ACTIVE | COMMUNITY
Start: 2020-09-11

## 2020-09-11 RX ORDER — FENOFIBRATE 145 MG/1
145 TABLET, COATED ORAL
Qty: 90 | Refills: 1 | Status: ACTIVE | COMMUNITY
Start: 2020-09-11

## 2020-09-11 RX ORDER — FOLIC ACID 1 MG/1
1 TABLET ORAL
Qty: 90 | Refills: 3 | Status: ACTIVE | COMMUNITY
Start: 2020-09-11

## 2020-09-14 ENCOUNTER — APPOINTMENT (OUTPATIENT)
Dept: CARDIOLOGY | Facility: CLINIC | Age: 50
End: 2020-09-14

## 2020-09-14 DIAGNOSIS — E78.00 PURE HYPERCHOLESTEROLEMIA, UNSPECIFIED: ICD-10-CM

## 2020-09-14 DIAGNOSIS — E78.1 PURE HYPERGLYCERIDEMIA: ICD-10-CM

## 2020-09-14 DIAGNOSIS — K85.90 ACUTE PANCREATITIS WITHOUT NECROSIS OR INFECTION, UNSPECIFIED: ICD-10-CM

## 2021-03-06 ENCOUNTER — INPATIENT (INPATIENT)
Facility: HOSPITAL | Age: 51
LOS: 4 days | Discharge: HOME | End: 2021-03-11
Attending: STUDENT IN AN ORGANIZED HEALTH CARE EDUCATION/TRAINING PROGRAM | Admitting: STUDENT IN AN ORGANIZED HEALTH CARE EDUCATION/TRAINING PROGRAM
Payer: MEDICAID

## 2021-03-06 VITALS
HEART RATE: 115 BPM | SYSTOLIC BLOOD PRESSURE: 146 MMHG | DIASTOLIC BLOOD PRESSURE: 106 MMHG | OXYGEN SATURATION: 98 % | HEIGHT: 67 IN | TEMPERATURE: 98 F | RESPIRATION RATE: 18 BRPM

## 2021-03-06 DIAGNOSIS — K85.90 ACUTE PANCREATITIS WITHOUT NECROSIS OR INFECTION, UNSPECIFIED: ICD-10-CM

## 2021-03-06 LAB
ALBUMIN SERPL ELPH-MCNC: 3.8 G/DL — SIGNIFICANT CHANGE UP (ref 3.3–5)
ALBUMIN SERPL ELPH-MCNC: 3.8 G/DL — SIGNIFICANT CHANGE UP (ref 3.3–5)
ALP SERPL-CCNC: 84 U/L — SIGNIFICANT CHANGE UP (ref 40–120)
ALP SERPL-CCNC: 85 U/L — SIGNIFICANT CHANGE UP (ref 40–120)
ALT FLD-CCNC: 172 U/L — HIGH (ref 4–41)
ALT FLD-CCNC: 180 U/L — HIGH (ref 4–41)
ANION GAP SERPL CALC-SCNC: 13 MMOL/L — SIGNIFICANT CHANGE UP (ref 7–14)
ANION GAP SERPL CALC-SCNC: 13 MMOL/L — SIGNIFICANT CHANGE UP (ref 7–14)
ANION GAP SERPL CALC-SCNC: 14 MMOL/L — SIGNIFICANT CHANGE UP (ref 7–14)
ANION GAP SERPL CALC-SCNC: 16 MMOL/L — HIGH (ref 7–14)
AST SERPL-CCNC: 352 U/L — HIGH (ref 4–40)
AST SERPL-CCNC: 382 U/L — HIGH (ref 4–40)
BASE EXCESS BLDV CALC-SCNC: 2.9 MMOL/L — HIGH (ref -3–2)
BASOPHILS # BLD AUTO: 0.03 K/UL — SIGNIFICANT CHANGE UP (ref 0–0.2)
BASOPHILS NFR BLD AUTO: 0.2 % — SIGNIFICANT CHANGE UP (ref 0–2)
BILIRUB SERPL-MCNC: 1.8 MG/DL — HIGH (ref 0.2–1.2)
BILIRUB SERPL-MCNC: 1.8 MG/DL — HIGH (ref 0.2–1.2)
BLOOD GAS VENOUS - CREATININE: SIGNIFICANT CHANGE UP MG/DL (ref 0.5–1.3)
BLOOD GAS VENOUS COMPREHENSIVE RESULT: SIGNIFICANT CHANGE UP
BLOOD GAS VENOUS COMPREHENSIVE RESULT: SIGNIFICANT CHANGE UP
BUN SERPL-MCNC: 11 MG/DL — SIGNIFICANT CHANGE UP (ref 7–23)
BUN SERPL-MCNC: 11 MG/DL — SIGNIFICANT CHANGE UP (ref 7–23)
BUN SERPL-MCNC: 12 MG/DL — SIGNIFICANT CHANGE UP (ref 7–23)
BUN SERPL-MCNC: 13 MG/DL — SIGNIFICANT CHANGE UP (ref 7–23)
CALCIUM SERPL-MCNC: 8.4 MG/DL — SIGNIFICANT CHANGE UP (ref 8.4–10.5)
CALCIUM SERPL-MCNC: 8.6 MG/DL — SIGNIFICANT CHANGE UP (ref 8.4–10.5)
CALCIUM SERPL-MCNC: 8.8 MG/DL — SIGNIFICANT CHANGE UP (ref 8.4–10.5)
CALCIUM SERPL-MCNC: 8.9 MG/DL — SIGNIFICANT CHANGE UP (ref 8.4–10.5)
CHLORIDE BLDV-SCNC: 106 MMOL/L — SIGNIFICANT CHANGE UP (ref 96–108)
CHLORIDE SERPL-SCNC: 93 MMOL/L — LOW (ref 98–107)
CHLORIDE SERPL-SCNC: 95 MMOL/L — LOW (ref 98–107)
CHLORIDE SERPL-SCNC: 96 MMOL/L — LOW (ref 98–107)
CHLORIDE SERPL-SCNC: 96 MMOL/L — LOW (ref 98–107)
CO2 SERPL-SCNC: 19 MMOL/L — LOW (ref 22–31)
CO2 SERPL-SCNC: 20 MMOL/L — LOW (ref 22–31)
CO2 SERPL-SCNC: 21 MMOL/L — LOW (ref 22–31)
CO2 SERPL-SCNC: 23 MMOL/L — SIGNIFICANT CHANGE UP (ref 22–31)
CREAT SERPL-MCNC: 0.75 MG/DL — SIGNIFICANT CHANGE UP (ref 0.5–1.3)
CREAT SERPL-MCNC: 0.76 MG/DL — SIGNIFICANT CHANGE UP (ref 0.5–1.3)
CREAT SERPL-MCNC: 0.78 MG/DL — SIGNIFICANT CHANGE UP (ref 0.5–1.3)
CREAT SERPL-MCNC: 0.81 MG/DL — SIGNIFICANT CHANGE UP (ref 0.5–1.3)
EOSINOPHIL # BLD AUTO: 0.03 K/UL — SIGNIFICANT CHANGE UP (ref 0–0.5)
EOSINOPHIL NFR BLD AUTO: 0.2 % — SIGNIFICANT CHANGE UP (ref 0–6)
GAS PNL BLDV: 136 MMOL/L — SIGNIFICANT CHANGE UP (ref 136–146)
GLUCOSE BLDC GLUCOMTR-MCNC: 91 MG/DL — SIGNIFICANT CHANGE UP (ref 70–99)
GLUCOSE BLDV-MCNC: 94 MG/DL — SIGNIFICANT CHANGE UP (ref 70–99)
GLUCOSE SERPL-MCNC: 80 MG/DL — SIGNIFICANT CHANGE UP (ref 70–99)
GLUCOSE SERPL-MCNC: 83 MG/DL — SIGNIFICANT CHANGE UP (ref 70–99)
GLUCOSE SERPL-MCNC: 86 MG/DL — SIGNIFICANT CHANGE UP (ref 70–99)
GLUCOSE SERPL-MCNC: 87 MG/DL — SIGNIFICANT CHANGE UP (ref 70–99)
HCO3 BLDV-SCNC: 26 MMOL/L — SIGNIFICANT CHANGE UP (ref 20–27)
HCT VFR BLD CALC: 45.9 % — SIGNIFICANT CHANGE UP (ref 39–50)
HCT VFR BLDA CALC: 46.9 % — SIGNIFICANT CHANGE UP (ref 39–51)
HGB BLD CALC-MCNC: 15.3 G/DL — SIGNIFICANT CHANGE UP (ref 13–17)
HGB BLD-MCNC: 16 G/DL — SIGNIFICANT CHANGE UP (ref 13–17)
IANC: 10 K/UL — HIGH (ref 1.5–8.5)
IMM GRANULOCYTES NFR BLD AUTO: 0.7 % — SIGNIFICANT CHANGE UP (ref 0–1.5)
LACTATE BLDV-MCNC: 2.6 MMOL/L — HIGH (ref 0.5–2)
LIDOCAIN IGE QN: 227 U/L — HIGH (ref 7–60)
LYMPHOCYTES # BLD AUTO: 1.64 K/UL — SIGNIFICANT CHANGE UP (ref 1–3.3)
LYMPHOCYTES # BLD AUTO: 13.1 % — SIGNIFICANT CHANGE UP (ref 13–44)
MCHC RBC-ENTMCNC: 34.9 GM/DL — SIGNIFICANT CHANGE UP (ref 32–36)
MCHC RBC-ENTMCNC: 35.3 PG — HIGH (ref 27–34)
MCV RBC AUTO: 101.3 FL — HIGH (ref 80–100)
MONOCYTES # BLD AUTO: 0.76 K/UL — SIGNIFICANT CHANGE UP (ref 0–0.9)
MONOCYTES NFR BLD AUTO: 6.1 % — SIGNIFICANT CHANGE UP (ref 2–14)
NEUTROPHILS # BLD AUTO: 10 K/UL — HIGH (ref 1.8–7.4)
NEUTROPHILS NFR BLD AUTO: 79.7 % — HIGH (ref 43–77)
NRBC # BLD: 0 /100 WBCS — SIGNIFICANT CHANGE UP
NRBC # FLD: 0 K/UL — SIGNIFICANT CHANGE UP
PCO2 BLDV: 42 MMHG — SIGNIFICANT CHANGE UP (ref 41–51)
PH BLDV: 7.43 — SIGNIFICANT CHANGE UP (ref 7.32–7.43)
PLATELET # BLD AUTO: 140 K/UL — LOW (ref 150–400)
PO2 BLDV: 36 MMHG — SIGNIFICANT CHANGE UP (ref 35–40)
POTASSIUM BLDV-SCNC: 4.5 MMOL/L — SIGNIFICANT CHANGE UP (ref 3.4–4.5)
POTASSIUM SERPL-MCNC: SIGNIFICANT CHANGE UP MMOL/L (ref 3.5–5.3)
POTASSIUM SERPL-SCNC: SIGNIFICANT CHANGE UP MMOL/L (ref 3.5–5.3)
PROT SERPL-MCNC: SIGNIFICANT CHANGE UP G/DL (ref 6–8.3)
PROT SERPL-MCNC: SIGNIFICANT CHANGE UP G/DL (ref 6–8.3)
RBC # BLD: 4.53 M/UL — SIGNIFICANT CHANGE UP (ref 4.2–5.8)
RBC # FLD: 11.6 % — SIGNIFICANT CHANGE UP (ref 10.3–14.5)
SAO2 % BLDV: 68.5 % — SIGNIFICANT CHANGE UP (ref 60–85)
SODIUM SERPL-SCNC: 129 MMOL/L — LOW (ref 135–145)
SODIUM SERPL-SCNC: 130 MMOL/L — LOW (ref 135–145)
WBC # BLD: 12.55 K/UL — HIGH (ref 3.8–10.5)
WBC # FLD AUTO: 12.55 K/UL — HIGH (ref 3.8–10.5)

## 2021-03-06 PROCEDURE — 74177 CT ABD & PELVIS W/CONTRAST: CPT | Mod: 26

## 2021-03-06 PROCEDURE — 99291 CRITICAL CARE FIRST HOUR: CPT

## 2021-03-06 RX ORDER — INSULIN HUMAN 100 [IU]/ML
7 INJECTION, SOLUTION SUBCUTANEOUS
Qty: 100 | Refills: 0 | Status: DISCONTINUED | OUTPATIENT
Start: 2021-03-06 | End: 2021-03-07

## 2021-03-06 RX ORDER — MORPHINE SULFATE 50 MG/1
4 CAPSULE, EXTENDED RELEASE ORAL EVERY 4 HOURS
Refills: 0 | Status: DISCONTINUED | OUTPATIENT
Start: 2021-03-06 | End: 2021-03-08

## 2021-03-06 RX ORDER — SODIUM CHLORIDE 9 MG/ML
1000 INJECTION INTRAMUSCULAR; INTRAVENOUS; SUBCUTANEOUS ONCE
Refills: 0 | Status: COMPLETED | OUTPATIENT
Start: 2021-03-06 | End: 2021-03-06

## 2021-03-06 RX ORDER — ENOXAPARIN SODIUM 100 MG/ML
40 INJECTION SUBCUTANEOUS AT BEDTIME
Refills: 0 | Status: DISCONTINUED | OUTPATIENT
Start: 2021-03-06 | End: 2021-03-11

## 2021-03-06 RX ORDER — CHLORHEXIDINE GLUCONATE 213 G/1000ML
1 SOLUTION TOPICAL
Refills: 0 | Status: DISCONTINUED | OUTPATIENT
Start: 2021-03-06 | End: 2021-03-11

## 2021-03-06 RX ORDER — DEXTROSE 50 % IN WATER 50 %
25 SYRINGE (ML) INTRAVENOUS
Refills: 0 | Status: DISCONTINUED | OUTPATIENT
Start: 2021-03-06 | End: 2021-03-07

## 2021-03-06 RX ORDER — SODIUM CHLORIDE 9 MG/ML
1000 INJECTION, SOLUTION INTRAVENOUS
Refills: 0 | Status: DISCONTINUED | OUTPATIENT
Start: 2021-03-06 | End: 2021-03-07

## 2021-03-06 RX ORDER — MORPHINE SULFATE 50 MG/1
4 CAPSULE, EXTENDED RELEASE ORAL ONCE
Refills: 0 | Status: DISCONTINUED | OUTPATIENT
Start: 2021-03-06 | End: 2021-03-06

## 2021-03-06 RX ORDER — SODIUM CHLORIDE 9 MG/ML
1000 INJECTION, SOLUTION INTRAVENOUS ONCE
Refills: 0 | Status: COMPLETED | OUTPATIENT
Start: 2021-03-06 | End: 2021-03-06

## 2021-03-06 RX ORDER — DEXTROSE 50 % IN WATER 50 %
50 SYRINGE (ML) INTRAVENOUS
Refills: 0 | Status: DISCONTINUED | OUTPATIENT
Start: 2021-03-06 | End: 2021-03-07

## 2021-03-06 RX ADMIN — SODIUM CHLORIDE 150 MILLILITER(S): 9 INJECTION, SOLUTION INTRAVENOUS at 23:51

## 2021-03-06 RX ADMIN — ENOXAPARIN SODIUM 40 MILLIGRAM(S): 100 INJECTION SUBCUTANEOUS at 23:45

## 2021-03-06 RX ADMIN — MORPHINE SULFATE 4 MILLIGRAM(S): 50 CAPSULE, EXTENDED RELEASE ORAL at 23:59

## 2021-03-06 RX ADMIN — MORPHINE SULFATE 4 MILLIGRAM(S): 50 CAPSULE, EXTENDED RELEASE ORAL at 18:04

## 2021-03-06 RX ADMIN — MORPHINE SULFATE 4 MILLIGRAM(S): 50 CAPSULE, EXTENDED RELEASE ORAL at 18:38

## 2021-03-06 RX ADMIN — SODIUM CHLORIDE 1000 MILLILITER(S): 9 INJECTION, SOLUTION INTRAVENOUS at 19:16

## 2021-03-06 RX ADMIN — SODIUM CHLORIDE 1000 MILLILITER(S): 9 INJECTION INTRAMUSCULAR; INTRAVENOUS; SUBCUTANEOUS at 18:04

## 2021-03-06 RX ADMIN — MORPHINE SULFATE 4 MILLIGRAM(S): 50 CAPSULE, EXTENDED RELEASE ORAL at 19:58

## 2021-03-06 RX ADMIN — INSULIN HUMAN 7 UNIT(S)/HR: 100 INJECTION, SOLUTION SUBCUTANEOUS at 23:51

## 2021-03-06 NOTE — H&P ADULT - NSHPLABSRESULTS_GEN_ALL_CORE
16.0   12.55 )-----------( 140      ( 06 Mar 2021 18:25 )             45.9       03-06    130<L>  |  96<L>  |  11  ----------------------------<  87  TNP   |  21<L>  |  0.78    Ca    8.4      06 Mar 2021 22:43    TPro  TNP  /  Alb  3.8  /  TBili  1.8<H>  /  DBili  x   /  AST  352<H>  /  ALT  172<H>  /  AlkPhos  84  03-06    CAPILLARY BLOOD GLUCOSE    POCT Blood Glucose.: 91 mg/dL (06 Mar 2021 23:42)    < from: CT Abdomen and Pelvis w/ IV Cont (03.06.21 @ 19:09) >    IMPRESSION:    Fat stranding and inflammatory changes surrounding the pancreatic head. There is infiltration ofthe mesenteric fat. No evidence of discrete fluid collections. Findings suspicious for acute interstitial edematous pancreatitis. Correlate clinically.    5 mm nodule right middle lobe lung (2:6), unchanged from 6/5/2019. Recommend nonemergent noncontrast chest CT for further evaluation of the lungs.    Hepatic steatosis    < end of copied text >

## 2021-03-06 NOTE — PATIENT PROFILE ADULT - LEGAL HELP
Colorectal Surgery Note    Stable overnight. Bowel viable. Patient's questions have toni answered. Ready to proceed with ileostomy revision. no

## 2021-03-06 NOTE — ED PROVIDER NOTE - CLINICAL SUMMARY MEDICAL DECISION MAKING FREE TEXT BOX
51 y/o male pmh ETOH abuse c/o abd pain x1 day- concern for pancreatitis vs gallbladder pathology vs gastritis. Back pain appears MSK in nature.- will check labs, pain control, ekg, CT, reassess

## 2021-03-06 NOTE — H&P ADULT - ATTENDING COMMENTS
pt is a 52 yo male with hx etoh drinks daily, pancreatitis last   admitted one year ago who presents with left sided upper quadrant  pain, ct scan showing interstitial edema pancreatitis no collection, elevated  triglycerides 3200. Pt admitted to icu for  close monitoring and insulin drip.  bp 130/70 rr 18 heent no jvd lungs clear abdomen mild luq tenderness, labs noted  as above,  bicarb 21 cr 0.78 wbc 12 ,  noted covid pcr positive    -admit to icu for hypertriglyceridemia, pancreatitis, start ivf d5lr at 150 and insulin  drip, check fs q 1 hrs  -monitor bmp, calcium, k, mg, phos,  -monitor urine output closely.  -will panculture if spikes, hold off on abx  -pain management for pancreatitits, iv dilaudid  -

## 2021-03-06 NOTE — ED ADULT NURSE NOTE - NSIMPLEMENTINTERV_GEN_ALL_ED
Implemented All Universal Safety Interventions:  [place] to call system. Call bell, personal items and telephone within reach. Instruct patient to call for assistance. Room bathroom lighting operational. Non-slip footwear when patient is off stretcher. Physically safe environment: no spills, clutter or unnecessary equipment. Stretcher in lowest position, wheels locked, appropriate side rails in place.

## 2021-03-06 NOTE — ED PROVIDER NOTE - ATTENDING CONTRIBUTION TO CARE
51M with pmh HTN, HLD, etoh dependence presenting with epigastric discomfort x 2 days with associated episodes of nausea and vomiting. Endorses hx of similar symptoms in setting of pancreatitis. no hx of abd surgeries. Endorses daily drinking, last drink yesterday night, no hx of significant withdrawal symptoms, tremors or seizures per patient. Denies fever, chills, cp, diarrhea, dysuria, headache, weakness    GEN: NAD, awake, well appearing  HEENT: NCAT, MMM, normal conjunctiva, perrl  CHEST/LUNGS: Non-tachypneic, CTAB, bilateral breath sounds  CARDIAC: Non-tachycardic, s1s2, normal perfusion, no peripheral edema  ABDOMEN: Soft, diffuse upper abd tenderness greater in epigastric region, No rebound/guarding  MSK: No joint tenderness, no gross deformity of extremities  SKIN: No rashes, no petechiae, no vesicles  NEURO: CN grossly intact, normal coordination, no focal motor or sensory deficits  PSYCH: Alert, appropriate, cooperative     Patient presenting with abdominal pain, nausea, vomiting. DDx including pancreatitis, gastritis and lower suspicion biliary colic. Screening labs, symptomatic control and CT to assess. 51M with pmh HTN, HLD, etoh dependence presenting with epigastric discomfort x 2 days with associated episodes of nausea and vomiting. Endorses hx of similar symptoms in setting of pancreatitis. no hx of abd surgeries. Endorses daily drinking, last drink yesterday night, no hx of significant withdrawal symptoms, tremors or seizures per patient. Denies fever, chills, cp, diarrhea, dysuria, headache, weakness    GEN: NAD, awake, well appearing  HEENT: NCAT, MMM, normal conjunctiva, perrl  CHEST/LUNGS: Non-tachypneic, CTAB, bilateral breath sounds  CARDIAC: Non-tachycardic, s1s2, normal perfusion, no peripheral edema  ABDOMEN: Soft, diffuse upper abd tenderness greater in epigastric region, No rebound/guarding  MSK: No joint tenderness, no gross deformity of extremities  SKIN: No rashes, no petechiae, no vesicles  NEURO: CN grossly intact, normal coordination, no focal motor or sensory deficits  PSYCH: Alert, appropriate, cooperative     Patient presenting with abdominal pain, nausea, vomiting. DDx including pancreatitis, gastritis and lower suspicion biliary colic. Screening labs, symptomatic control and CT to assess.   Patient noted with pancreatitis on labs and imaging with elevated triglycerides. MICU consulted for insulin gtt initiation and close monitoring.     Upon my evaluation, this patient had a high probability of imminent or life-threatening deterioration due to acute pancreatitis which required my direct attention, intervention, and personal management.  The patient has a  medical condition that impairs one or more vital organ systems.  Frequent personal assessment and adjustment of medical interventions was performed.      I have personally provided 40 minutes of critical care time exclusive of time spent on separately billable procedures. Time includes review of laboratory data, radiology results, discussion with consultants, patient and family; monitoring for potential decompensation, as well as time spent retrieving data and reviewing the chart and documenting the visit. Interventions were performed as documented above.

## 2021-03-06 NOTE — ED PROVIDER NOTE - PROGRESS NOTE DETAILS
Patient with pancreatitis 2/2 hypertriglyceridemia. MICU consulted. Given likely insulin gtt initiation, patient transferred from  to Main ED. Discussed with and signed out to Dr. Santiago and Blue team.

## 2021-03-06 NOTE — H&P ADULT - NSHPSOCIALHISTORY_GEN_ALL_CORE
Alcohol use: Beer 7 days/wk and vodka 3x/week.   Denies tobacco or illicit drug use. Alcohol use: Gin 3x/week.   Denies tobacco or illicit drug use.

## 2021-03-06 NOTE — H&P ADULT - NSHPPHYSICALEXAM_GEN_ALL_CORE
PHYSICAL EXAM:  T(C): 36.7 (03-07-21 @ 02:00), Max: 37.5 (03-06-21 @ 20:29)  HR: 87 (03-07-21 @ 02:08) (81 - 115)  BP: 145/74 (03-07-21 @ 02:00) (142/71 - 154/96)  RR: 14 (03-07-21 @ 02:08) (12 - 18)  SpO2: 96% (03-07-21 @ 02:08) (96% - 99%)  GENERAL: NAD, well-developed  HEAD:  Atraumatic, Normocephalic  EYES: EOMI, PERRLA, conjunctiva and sclera clear  NECK: Supple, No JVD  CHEST/LUNG: Clear to auscultation bilaterally; No wheeze  HEART: Regular rate and rhythm; No murmurs, rubs, or gallops  ABDOMEN: Soft, tenderness to palpation in upper quadrants and epigastrium. Nondistended; Bowel sounds present  EXTREMITIES:  2+ Peripheral Pulses, No clubbing, cyanosis, or edema  NEUROLOGY: AAOx4, no focal deficits  SKIN: No rashes or lesions

## 2021-03-06 NOTE — H&P ADULT - HISTORY OF PRESENT ILLNESS
52 y/o male with pmh of hyperlipidemia and previous admission of pancreatitis in the setting of hypertriglyceridemia who presented to the ED for abdominal pain with nausea and episodes of NBNB emesis.       While in the ED, the pt was found to have  The pt received 1L NS Bolus and 1L LR Bolus and morphine 4 mg IVP x2.   Previous admission for  52 y/o male with pmh of hyperlipidemia and previous admission of pancreatitis in the setting of hypertriglyceridemia who presented to the ED for abdominal pain with nausea and episodes of NBNB emesis.       While in the ED, the pt was found to have an elevated lipase of 227 and hypertriglyceridemia of 3127 with a CT of the abdomen and pelvis demonstrating acute interstitial edematous pancreatitis. The pt received 1L NS Bolus and 1L LR Bolus and morphine 4 mg IVP x2.   She was recently admitted to the MICU on 9/2020 for pancreatitis secondary to hypertriglyceridemia with labs demonstrating triglycerides of 3801, lipase 774.8, and hyponatremia 122. She received an insulin gtt and IVF with an improvement of her triglycerides. 52 y/o male with pmh of hyperlipidemia and previous admission of pancreatitis in the setting of hypertriglyceridemia who presented to the ED for abdominal pain with nausea and episodes of emesis. The pt has been having abdominal discomfort with back pain for the past few days. Also noted to having 2 episodes of NBNB emesis. He also endorses alcohol use, beer daily and vodka 3x/week, with his last drink of 1 pint of vodka approximately 2 days ago. While in the ED, the pt was found to have an elevated lipase of 227 and hypertriglyceridemia of 3127 with a CT of the abdomen and pelvis demonstrating acute interstitial edematous pancreatitis. The pt received 1L NS Bolus and 1L LR Bolus and morphine 4 mg IVP x2. He was recently admitted to the MICU on 9/2020 for pancreatitis secondary to hypertriglyceridemia with labs demonstrating triglycerides of 3801, lipase 774.8, and hyponatremia 122. He received an insulin gtt and IVF with an improvement of he triglycerides. 50 y/o male with pmh of hyperlipidemia and previous admission of pancreatitis in the setting of hypertriglyceridemia who presented to the ED for abdominal pain with nausea and episodes of emesis. The pt has been having abdominal discomfort described as a tight feeling with back pain for the past few days, however endorses that today it was a 10/10. Also noted to having 2 episodes of NBNB emesis. He also endorses alcohol use, 1 pint of gin 3x/week, with his last drink of 1 pint of gin approximately 2 days ago. While in the ED, the pt was found to have an elevated lipase of 227 and hypertriglyceridemia of 3127 with a CT of the abdomen and pelvis demonstrating acute interstitial edematous pancreatitis. The pt received 1L NS Bolus and 1L LR Bolus and morphine 4 mg IVP x2. He was recently admitted to the MICU on 9/2020 for pancreatitis secondary to hypertriglyceridemia with labs demonstrating triglycerides of 3801, lipase 774.8, and hyponatremia 122. He received an insulin gtt and IVF with an improvement of he triglycerides. 50 y/o male with pmh of hyperlipidemia and previous admission of pancreatitis in the setting of hypertriglyceridemia who presented to the ED for abdominal pain with nausea and episodes of emesis. The pt has been having abdominal discomfort described as a tight feeling with back pain for the past few days, however endorses that today it was a 10/10. Also noted to having 2 episodes of NBNB emesis. He also endorses alcohol use, 1 pint of gin 3x/week, with his last drink of 1 pint of gin approximately 2 days ago. While in the ED, the pt was found to have an elevated lipase of 227 and hypertriglyceridemia of 3127 with a CT of the abdomen and pelvis demonstrating acute interstitial edematous pancreatitis. The pt received 1L NS Bolus and 1L LR Bolus and morphine 4 mg IVP x2. He was recently admitted to the MICU on 9/2020 for pancreatitis secondary to hypertriglyceridemia with labs demonstrating triglycerides of 3801, lipase 774.8, and hyponatremia 122. He received an insulin gtt and IVF with an improvement of his triglycerides. Pt admitted to MICU for insulin gtt in the setting of hypertriglyceridemia and further monitoring.

## 2021-03-06 NOTE — ED ADULT NURSE NOTE - OBJECTIVE STATEMENT
Pt rec'd in intake, c/o N/V and abd pain since this morning. Pt reports daily alcohol intake, denies hx of withdrawal, last drink last night.

## 2021-03-06 NOTE — ED ADULT TRIAGE NOTE - CHIEF COMPLAINT QUOTE
Pt complaining of abdominal pain, vomiting, and upper back pain starting this AM. Denies fever, chills, diarrhea. Hx HTN, HLD

## 2021-03-06 NOTE — ED ADULT NURSE REASSESSMENT NOTE - NS ED NURSE REASSESS COMMENT FT1
Pt resting comfortably. pt offered no complains at present. states pain is tolerable. respiration even and non-labored. in NAD. Vs as noted. REGGIE Fraire made aware, no new order at this time. IVF infusing

## 2021-03-06 NOTE — ED ADULT NURSE REASSESSMENT NOTE - NS ED NURSE REASSESS COMMENT FT1
Patient received to spot 28, A&Ox4, in NAD. Denies any abdominal pain this time. Repeat labs sent. Pending results for insulin drip initiation.

## 2021-03-06 NOTE — H&P ADULT - ASSESSMENT
52 y/o male with a past medical history of ______ , admitted for ___, now with _____, admitted to ICU for further assessment and management.     NEUROLOGY:  AAOx4    PULMONARY:  -  -    CARDIOLOGY:  -  -    GASTROINTESTINAL:  # Acute pancreatitis  Abdominal discomfort w/ NBNB episodes of emesis  Lipase elevated at 227, pseudohyponatremia  CT A&P w/ IV contrast demonstrating fat stranding, inflammatory changes surrounding the pancreatic head, and infiltration of the mesenteric fat, possibly suggestive of acute interstitial edematous pancreatitis.  Likely in the setting of hypertriglyceridemia and alcohol use  C/w IVF  For symptomatic pain, morphine  Remain NPO, advance diet when off insulin gtt  Will start on insulin gtt once repeat K results  Monitor for signs of alcohol withdrawal    # Transaminitis  Elevated in setting of elevated transaminases  Suggestive of alcohol use  Trend LFTs    RENAL/:  No active issues    INFECTIOUS DISEASE:  No active issues    HEMATOLOGY:  No active issues    ENDOCRINE  # Hypertriglyceridemia  Elevated triglycerides: 3127  Start on Insulin gtt  Endocrinology consulted for further management    PROPHYLAXIS  Lovenox    ETHICS/DISPOSITION:  -      LINES/DRAINS/TUBES/DEVICES:  -  -    iLlian Cooper MD, PGY1  Pager: [facility]: [phone]/[facility]: [phone]    50 y/o male with a past medical history of ______ , admitted for ___, now with _____, admitted to ICU for further assessment and management.     NEUROLOGY:  AAOx4    PULMONARY:  -  -    CARDIOLOGY:  -  -    GASTROINTESTINAL:  # Acute pancreatitis  Abdominal discomfort w/ NBNB episodes of emesis  Lipase elevated at 227, pseudohyponatremia  CT A&P w/ IV contrast demonstrating fat stranding, inflammatory changes surrounding the pancreatic head, and infiltration of the mesenteric fat, possibly suggestive of acute interstitial edematous pancreatitis.  Likely in the setting of hypertriglyceridemia and alcohol use  C/w IVF  For symptomatic pain, morphine  Remain NPO, advance diet when off insulin gtt  Will start on insulin gtt once repeat K results  Monitor for signs of alcohol withdrawal    # Transaminitis  Elevated in setting of elevated transaminases  Suggestive of alcohol use  Trend LFTs    RENAL/:  No active issues    INFECTIOUS DISEASE:  # Leukocytosis  In the setting of acute pancreatitis  Previously noted to have leukocytosis on prior admission with pancreatitis  Will monitor trend, if febrile, will     HEMATOLOGY:  No active issues    ENDOCRINE  # Hypertriglyceridemia  Elevated triglycerides: 3127  Start on Insulin gtt  Endocrinology consulted for further management    PROPHYLAXIS  Lovenox    ETHICS/DISPOSITION:  -      LINES/DRAINS/TUBES/DEVICES:  -  -    Lilian Cooper MD, PGY1  Pager: [facility]: [phone]/[facility]: [phone]    50 y/o male with pmh of hyperlipidemia and previous admission of pancreatitis in the setting of hypertriglyceridemia admitted for pancreatitis in the setting of hypertriglyceridemia, requiring an insulin drip, transferred to ICU for further assessment and management.     NEUROLOGY:  AAOx4    PULMONARY:  Monitor resp. status   No active issues at this time    CARDIOLOGY:  No active issues    GASTROINTESTINAL:  # Acute pancreatitis  Abdominal discomfort w/ NBNB episodes of emesis  Lipase elevated at 227, pseudohyponatremia  CT A&P w/ IV contrast demonstrating fat stranding, inflammatory changes surrounding the pancreatic head, and infiltration of the mesenteric fat, possibly suggestive of acute interstitial edematous pancreatitis.  Likely in the setting of hypertriglyceridemia and alcohol use  C/w IVF  For symptomatic pain, morphine  Remain NPO, advance diet when off insulin gtt  Will start on insulin gtt once repeat K results  Monitor for signs of alcohol withdrawal    # Transaminitis  Elevated in setting of elevated transaminases  Suggestive of alcohol use  Trend LFTs    RENAL/:  No active issues    INFECTIOUS DISEASE:  # Leukocytosis  In the setting of acute pancreatitis  Previously noted to have leukocytosis on prior admission with pancreatitis  Will monitor trend, if febrile, will     HEMATOLOGY:  No active issues    ENDOCRINE  # Hypertriglyceridemia  Elevated triglycerides: 3127  Start on Insulin gtt  Endocrinology consulted for further management    PROPHYLAXIS  Lovenox    ETHICS/DISPOSITION:  Full code    LINES/DRAINS/TUBES/DEVICES:    Lilian Cooper MD, PGY1  Pager: [facility]: [phone]/[facility]: [phone]    50 y/o male with pmh of hyperlipidemia and previous admission of pancreatitis in the setting of hypertriglyceridemia admitted for pancreatitis in the setting of hypertriglyceridemia, requiring an insulin drip, transferred to ICU for further assessment and management.     NEUROLOGY:  AAOx4    PULMONARY:  Monitor resp. status   No active issues at this time    CARDIOLOGY:  No active issues    GASTROINTESTINAL:  # Acute pancreatitis  Abdominal discomfort w/ NBNB episodes of emesis  Lipase elevated at 227, pseudohyponatremia  CT A&P w/ IV contrast demonstrating fat stranding, inflammatory changes surrounding the pancreatic head, and infiltration of the mesenteric fat, possibly suggestive of acute interstitial edematous pancreatitis.  Likely in the setting of hypertriglyceridemia and alcohol use  C/w IVF  For symptomatic pain, morphine  Remain NPO, advance diet when off insulin gtt  Insulin gtt started  Started on Fenofibrate 145 mg QD, Lovaza 2 g BID, and atorvastatin 40 mg QD  Monitor for signs of alcohol withdrawal    # Transaminitis  Elevated in setting of elevated transaminases  Suggestive of alcohol use  Trend LFTs    RENAL/:  No active issues    INFECTIOUS DISEASE:  # Leukocytosis  In the setting of acute pancreatitis  Previously noted to have leukocytosis on prior admission with pancreatitis  Will monitor trend, if febrile, will     HEMATOLOGY:  No active issues    ENDOCRINE  # Hypertriglyceridemia  Elevated triglycerides: 3127  Start on Insulin gtt  Endocrinology consulted for further management  Trend BMPq6 for hypokalemia    PROPHYLAXIS  Lovenox    ETHICS/DISPOSITION:  Full code    LINES/DRAINS/TUBES/DEVICES:    Lilian Cooper MD, PGY1  Pager: [facility]: [phone]/[facility]: [phone]    52 y/o male with pmh of hyperlipidemia and previous admission of pancreatitis in the setting of hypertriglyceridemia admitted for pancreatitis in the setting of hypertriglyceridemia, requiring an insulin drip, transferred to ICU for further assessment and management.     NEUROLOGY:  AAOx4    PULMONARY:  # COVID +   Asymptomatic   Will hold off on monoclonal Ab infusion given pt is 52 y/o, BMI of 24, only w/ pmh of HLD and is not immunosuppressed  Monitor pulse oximetry     CARDIOLOGY:  No active issues    GASTROINTESTINAL:  # Acute pancreatitis  Abdominal discomfort w/ NBNB episodes of emesis  Lipase elevated at 227, pseudohyponatremia  CT A&P w/ IV contrast demonstrating fat stranding, inflammatory changes surrounding the pancreatic head, and infiltration of the mesenteric fat, possibly suggestive of acute interstitial edematous pancreatitis.  Likely in the setting of hypertriglyceridemia and alcohol use  C/w IVF  For symptomatic pain, morphine 4 mg q4hrs  Remain NPO, advance diet when off insulin gtt  Insulin gtt started  Monitor FSq1hr  Monitor BMP, K, mag, phos, and calcium  Started on Fenofibrate 145 mg QD, Lovaza 2 g BID, and atorvastatin 40 mg QD  Monitor for signs of alcohol withdrawal    # Transaminitis  Elevated in setting of elevated transaminases  Suggestive of alcohol use  Trend LFTs    RENAL/:  No active issues    INFECTIOUS DISEASE:  # Leukocytosis  In the setting of acute pancreatitis  Previously noted to have leukocytosis on prior admission with pancreatitis  Will monitor trend, if febrile, will consider adding Abx    HEMATOLOGY:  No active issues    ENDOCRINE  # Hypertriglyceridemia  Elevated triglycerides: 3127  Start on Insulin gtt  Endocrinology consulted for further management    PROPHYLAXIS  Lovenox    ETHICS/DISPOSITION:  Full code    LINES/DRAINS/TUBES/DEVICES:    Lilian Cooper MD, PGY1  Pager: [facility]: [phone]/[facility]: [phone]    50 y/o male with pmh of hyperlipidemia and previous admission of pancreatitis in the setting of hypertriglyceridemia admitted for pancreatitis in the setting of hypertriglyceridemia, requiring an insulin drip, transferred to ICU for further assessment and management.     NEUROLOGY:  AAOx4    PULMONARY:  # COVID +   Asymptomatic   Will hold off on monoclonal Ab infusion given pt is 50 y/o, BMI of 24, only w/ pmh of HLD and is not immunosuppressed  Monitor pulse oximetry     CARDIOLOGY:  No active issues    GASTROINTESTINAL:  # Acute pancreatitis  Abdominal discomfort w/ NBNB episodes of emesis  Lipase elevated at 227, pseudohyponatremia  CT A&P w/ IV contrast demonstrating fat stranding, inflammatory changes surrounding the pancreatic head, and infiltration of the mesenteric fat, possibly suggestive of acute interstitial edematous pancreatitis.  Likely in the setting of hypertriglyceridemia and alcohol use  C/w D10-0.45%NS IVF  For symptomatic pain, morphine 4 mg q4hrs  Remain NPO, advance diet when off insulin gtt  Insulin gtt started  Monitor FSq1hr  Monitor and replete BMPq6, K, mag, phos, and calcium  Started on Fenofibrate 145 mg QD, Lovaza 2 g BID, and atorvastatin 40 mg QD  Monitor for signs of alcohol withdrawal, on phenobarbital 130 mg IVP q4hrs    # Transaminitis  Elevated in setting of elevated transaminases  Suggestive of alcohol use  Trend LFTs    RENAL/:  No active issues    INFECTIOUS DISEASE:  # Leukocytosis  In the setting of acute pancreatitis  Previously noted to have leukocytosis on prior admission with pancreatitis  Will monitor trend, if febrile, will consider adding Abx    HEMATOLOGY:  No active issues    ENDOCRINE  # Hypertriglyceridemia  Elevated triglycerides: 3127  Start on Insulin gtt  Endocrinology consulted for further management    PROPHYLAXIS  Lovenox    ETHICS/DISPOSITION:  Full code    LINES/DRAINS/TUBES/DEVICES:    Lilian Cooper MD, PGY1  Pager: [facility]: [phone]/[facility]: [phone]    52 y/o male with pmh of hyperlipidemia and previous admission of pancreatitis in the setting of hypertriglyceridemia admitted for pancreatitis in the setting of hypertriglyceridemia, requiring an insulin drip, transferred to ICU for further assessment and management.     NEUROLOGY:  AAOx4    PULMONARY:  # COVID +   Asymptomatic   Will hold off on monoclonal Ab infusion given pt is 52 y/o, BMI of 24, only w/ pmh of HLD and is not immunosuppressed  Monitor pulse oximetry     CARDIOLOGY:  No active issues    GASTROINTESTINAL:  # Acute pancreatitis  Abdominal discomfort w/ NBNB episodes of emesis  Lipase elevated at 227, pseudohyponatremia  CT A&P w/ IV contrast demonstrating fat stranding, inflammatory changes surrounding the pancreatic head, and infiltration of the mesenteric fat, possibly suggestive of acute interstitial edematous pancreatitis.  Likely in the setting of hypertriglyceridemia and alcohol use  C/w D10-0.45%NS IVF  For symptomatic pain, morphine 4 mg q4hrs  Remain NPO, advance diet when off insulin gtt  Insulin gtt started  Monitor FSq1hr  Monitor and replete BMPq6, K, mag, phos, and calcium  Started on Fenofibrate 145 mg QD, Lovaza 2 g BID, and atorvastatin 40 mg QD  Monitor for signs of alcohol withdrawal, on phenobarbital 130 mg IVP q4hrs    # Transaminitis  Elevated in setting of elevated transaminases  Suggestive of alcohol use  Trend LFTs    RENAL/:  No active issues    INFECTIOUS DISEASE:  # Leukocytosis  In the setting of acute pancreatitis  Previously noted to have leukocytosis on prior admission with pancreatitis  Will monitor trend, if febrile, will consider adding Abx    HEMATOLOGY:  No active issues    ENDOCRINE  # Hypertriglyceridemia  Elevated triglycerides: 3127  Start on Insulin gtt  Consider endo consult in AM    PROPHYLAXIS  Lovenox    ETHICS/DISPOSITION:  Full code    LINES/DRAINS/TUBES/DEVICES:    Lilian Cooper MD, PGY1  Pager: [facility]: [phone]/[facility]: [phone]    50 y/o male with pmh of hyperlipidemia and previous admission of pancreatitis in the setting of hypertriglyceridemia admitted for pancreatitis in the setting of hypertriglyceridemia, requiring an insulin drip, transferred to ICU for further assessment and management.     NEUROLOGY:  AAOx4    PULMONARY:  # COVID +   Asymptomatic   Will hold off on monoclonal Ab infusion given pt is 50 y/o, BMI of 24, only w/ pmh of HLD and is not immunosuppressed  Monitor pulse oximetry     CARDIOLOGY:  No active issues    GASTROINTESTINAL:  # Acute pancreatitis  Abdominal discomfort w/ NBNB episodes of emesis  Lipase elevated at 227, pseudohyponatremia  CT A&P w/ IV contrast demonstrating fat stranding, inflammatory changes surrounding the pancreatic head, and infiltration of the mesenteric fat, possibly suggestive of acute interstitial edematous pancreatitis.  Likely in the setting of hypertriglyceridemia and alcohol use  C/w D10-0.45%NS w/ KCl 80mEq IVF  For symptomatic pain, morphine 4 mg q4hrs  Remain NPO, advance diet when off insulin gtt  Insulin gtt started  Monitor FSq1hr  Monitor and replete BMPq6, K, mag, phos, and calcium  Started on Fenofibrate 145 mg QD, Lovaza 2 g BID, and atorvastatin 40 mg QD  Monitor for signs of alcohol withdrawal, on phenobarbital 130 mg IVP q4hrs    # Transaminitis  Elevated in setting of elevated transaminases  Suggestive of alcohol use  Trend LFTs    RENAL/:  No active issues    INFECTIOUS DISEASE:  # Leukocytosis  In the setting of acute pancreatitis  Previously noted to have leukocytosis on prior admission with pancreatitis  Will monitor trend, if febrile, will consider adding Abx    HEMATOLOGY:  No active issues    ENDOCRINE  # Hypertriglyceridemia  Elevated triglycerides: 3127  Start on Insulin gtt  Consider endo consult in AM    PROPHYLAXIS  Lovenox    ETHICS/DISPOSITION:  Full code    LINES/DRAINS/TUBES/DEVICES:  Peripheral line    Lilian Cooper MD, PGY1  Pager: [facility]: [phone]/[facility]: [phone]

## 2021-03-06 NOTE — ED PROVIDER NOTE - OBJECTIVE STATEMENT
52 y/o male no pmh c/o abd pain x1 day. Pt admits to having intermittent upper back pain x1-2 days. 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 Pt then developed upper abd pain today, worse with palpation. Pt admits to nausea and 2 episodes of emesis, prompting ER visit. Pt admits to drinking about 1 pint of vodka x 2 days ago. Admits to drinking beer daily and vodka 2-3 times per week. Denies hx of withdrawal. Denies chest pain, sob, diarrhea, numbness, tingling, weakness, dizziness, syncope, fever or chills.

## 2021-03-07 LAB
ALBUMIN SERPL ELPH-MCNC: 3.1 G/DL — LOW (ref 3.3–5)
ALP SERPL-CCNC: 84 U/L — SIGNIFICANT CHANGE UP (ref 40–120)
ALT FLD-CCNC: 119 U/L — HIGH (ref 4–41)
ANION GAP SERPL CALC-SCNC: 12 MMOL/L — SIGNIFICANT CHANGE UP (ref 7–14)
ANION GAP SERPL CALC-SCNC: 15 MMOL/L — HIGH (ref 7–14)
ANION GAP SERPL CALC-SCNC: 7 MMOL/L — SIGNIFICANT CHANGE UP (ref 7–14)
ANION GAP SERPL CALC-SCNC: 8 MMOL/L — SIGNIFICANT CHANGE UP (ref 7–14)
ANION GAP SERPL CALC-SCNC: 9 MMOL/L — SIGNIFICANT CHANGE UP (ref 7–14)
AST SERPL-CCNC: 149 U/L — HIGH (ref 4–40)
B PERT DNA SPEC QL NAA+PROBE: SIGNIFICANT CHANGE UP
BILIRUB DIRECT SERPL-MCNC: 0.3 MG/DL — HIGH (ref 0–0.2)
BILIRUB INDIRECT FLD-MCNC: 1.3 MG/DL — HIGH (ref 0–1)
BILIRUB SERPL-MCNC: 1.6 MG/DL — HIGH (ref 0.2–1.2)
BUN SERPL-MCNC: 10 MG/DL — SIGNIFICANT CHANGE UP (ref 7–23)
BUN SERPL-MCNC: 4 MG/DL — LOW (ref 7–23)
BUN SERPL-MCNC: 4 MG/DL — LOW (ref 7–23)
BUN SERPL-MCNC: 6 MG/DL — LOW (ref 7–23)
BUN SERPL-MCNC: 8 MG/DL — SIGNIFICANT CHANGE UP (ref 7–23)
C PNEUM DNA SPEC QL NAA+PROBE: SIGNIFICANT CHANGE UP
CA-I BLD-SCNC: 1.06 MMOL/L — LOW (ref 1.15–1.29)
CALCIUM SERPL-MCNC: 8.6 MG/DL — SIGNIFICANT CHANGE UP (ref 8.4–10.5)
CALCIUM SERPL-MCNC: 8.7 MG/DL — SIGNIFICANT CHANGE UP (ref 8.4–10.5)
CALCIUM SERPL-MCNC: 8.7 MG/DL — SIGNIFICANT CHANGE UP (ref 8.4–10.5)
CHLORIDE SERPL-SCNC: 100 MMOL/L — SIGNIFICANT CHANGE UP (ref 98–107)
CHLORIDE SERPL-SCNC: 102 MMOL/L — SIGNIFICANT CHANGE UP (ref 98–107)
CHLORIDE SERPL-SCNC: 103 MMOL/L — SIGNIFICANT CHANGE UP (ref 98–107)
CHLORIDE SERPL-SCNC: 104 MMOL/L — SIGNIFICANT CHANGE UP (ref 98–107)
CHLORIDE SERPL-SCNC: 99 MMOL/L — SIGNIFICANT CHANGE UP (ref 98–107)
CO2 SERPL-SCNC: 21 MMOL/L — LOW (ref 22–31)
CO2 SERPL-SCNC: 21 MMOL/L — LOW (ref 22–31)
CO2 SERPL-SCNC: 23 MMOL/L — SIGNIFICANT CHANGE UP (ref 22–31)
CO2 SERPL-SCNC: 23 MMOL/L — SIGNIFICANT CHANGE UP (ref 22–31)
CO2 SERPL-SCNC: 24 MMOL/L — SIGNIFICANT CHANGE UP (ref 22–31)
CREAT SERPL-MCNC: 0.71 MG/DL — SIGNIFICANT CHANGE UP (ref 0.5–1.3)
CREAT SERPL-MCNC: 0.72 MG/DL — SIGNIFICANT CHANGE UP (ref 0.5–1.3)
CREAT SERPL-MCNC: 0.73 MG/DL — SIGNIFICANT CHANGE UP (ref 0.5–1.3)
CREAT SERPL-MCNC: 0.82 MG/DL — SIGNIFICANT CHANGE UP (ref 0.5–1.3)
CREAT SERPL-MCNC: 0.83 MG/DL — SIGNIFICANT CHANGE UP (ref 0.5–1.3)
FLUAV SUBTYP SPEC NAA+PROBE: SIGNIFICANT CHANGE UP
FLUBV RNA SPEC QL NAA+PROBE: SIGNIFICANT CHANGE UP
FOLATE SERPL-MCNC: 19.5 NG/ML — HIGH (ref 3.1–17.5)
GLUCOSE BLDC GLUCOMTR-MCNC: 104 MG/DL — HIGH (ref 70–99)
GLUCOSE BLDC GLUCOMTR-MCNC: 104 MG/DL — HIGH (ref 70–99)
GLUCOSE BLDC GLUCOMTR-MCNC: 106 MG/DL — HIGH (ref 70–99)
GLUCOSE BLDC GLUCOMTR-MCNC: 106 MG/DL — HIGH (ref 70–99)
GLUCOSE BLDC GLUCOMTR-MCNC: 111 MG/DL — HIGH (ref 70–99)
GLUCOSE BLDC GLUCOMTR-MCNC: 112 MG/DL — HIGH (ref 70–99)
GLUCOSE BLDC GLUCOMTR-MCNC: 113 MG/DL — HIGH (ref 70–99)
GLUCOSE BLDC GLUCOMTR-MCNC: 114 MG/DL — HIGH (ref 70–99)
GLUCOSE BLDC GLUCOMTR-MCNC: 136 MG/DL — HIGH (ref 70–99)
GLUCOSE BLDC GLUCOMTR-MCNC: 222 MG/DL — HIGH (ref 70–99)
GLUCOSE BLDC GLUCOMTR-MCNC: 56 MG/DL — LOW (ref 70–99)
GLUCOSE BLDC GLUCOMTR-MCNC: 66 MG/DL — LOW (ref 70–99)
GLUCOSE BLDC GLUCOMTR-MCNC: 77 MG/DL — SIGNIFICANT CHANGE UP (ref 70–99)
GLUCOSE BLDC GLUCOMTR-MCNC: 80 MG/DL — SIGNIFICANT CHANGE UP (ref 70–99)
GLUCOSE BLDC GLUCOMTR-MCNC: 83 MG/DL — SIGNIFICANT CHANGE UP (ref 70–99)
GLUCOSE BLDC GLUCOMTR-MCNC: 86 MG/DL — SIGNIFICANT CHANGE UP (ref 70–99)
GLUCOSE BLDC GLUCOMTR-MCNC: 89 MG/DL — SIGNIFICANT CHANGE UP (ref 70–99)
GLUCOSE BLDC GLUCOMTR-MCNC: 91 MG/DL — SIGNIFICANT CHANGE UP (ref 70–99)
GLUCOSE BLDC GLUCOMTR-MCNC: 95 MG/DL — SIGNIFICANT CHANGE UP (ref 70–99)
GLUCOSE BLDC GLUCOMTR-MCNC: 98 MG/DL — SIGNIFICANT CHANGE UP (ref 70–99)
GLUCOSE SERPL-MCNC: 104 MG/DL — HIGH (ref 70–99)
GLUCOSE SERPL-MCNC: 54 MG/DL — CRITICAL LOW (ref 70–99)
GLUCOSE SERPL-MCNC: 77 MG/DL — SIGNIFICANT CHANGE UP (ref 70–99)
GLUCOSE SERPL-MCNC: 81 MG/DL — SIGNIFICANT CHANGE UP (ref 70–99)
GLUCOSE SERPL-MCNC: 85 MG/DL — SIGNIFICANT CHANGE UP (ref 70–99)
HADV DNA SPEC QL NAA+PROBE: SIGNIFICANT CHANGE UP
HCOV 229E RNA SPEC QL NAA+PROBE: SIGNIFICANT CHANGE UP
HCOV HKU1 RNA SPEC QL NAA+PROBE: SIGNIFICANT CHANGE UP
HCOV NL63 RNA SPEC QL NAA+PROBE: SIGNIFICANT CHANGE UP
HCOV OC43 RNA SPEC QL NAA+PROBE: SIGNIFICANT CHANGE UP
HCT VFR BLD CALC: 39.3 % — SIGNIFICANT CHANGE UP (ref 39–50)
HCT VFR BLD CALC: 39.8 % — SIGNIFICANT CHANGE UP (ref 39–50)
HGB BLD-MCNC: 12.8 G/DL — LOW (ref 13–17)
HGB BLD-MCNC: 13.4 G/DL — SIGNIFICANT CHANGE UP (ref 13–17)
HMPV RNA SPEC QL NAA+PROBE: SIGNIFICANT CHANGE UP
HPIV1 RNA SPEC QL NAA+PROBE: SIGNIFICANT CHANGE UP
HPIV2 RNA SPEC QL NAA+PROBE: SIGNIFICANT CHANGE UP
HPIV3 RNA SPEC QL NAA+PROBE: SIGNIFICANT CHANGE UP
HPIV4 RNA SPEC QL NAA+PROBE: SIGNIFICANT CHANGE UP
MAGNESIUM SERPL-MCNC: 1.6 MG/DL — SIGNIFICANT CHANGE UP (ref 1.6–2.6)
MAGNESIUM SERPL-MCNC: 1.9 MG/DL — SIGNIFICANT CHANGE UP (ref 1.6–2.6)
MCHC RBC-ENTMCNC: 32.2 GM/DL — SIGNIFICANT CHANGE UP (ref 32–36)
MCHC RBC-ENTMCNC: 34 PG — SIGNIFICANT CHANGE UP (ref 27–34)
MCHC RBC-ENTMCNC: 34.1 GM/DL — SIGNIFICANT CHANGE UP (ref 32–36)
MCHC RBC-ENTMCNC: 34.6 PG — HIGH (ref 27–34)
MCV RBC AUTO: 101.6 FL — HIGH (ref 80–100)
MCV RBC AUTO: 105.9 FL — HIGH (ref 80–100)
NRBC # BLD: 0 /100 WBCS — SIGNIFICANT CHANGE UP
NRBC # BLD: 0 /100 WBCS — SIGNIFICANT CHANGE UP
NRBC # FLD: 0 K/UL — SIGNIFICANT CHANGE UP
NRBC # FLD: 0 K/UL — SIGNIFICANT CHANGE UP
PHENOBARB SERPL-MCNC: 7.7 UG/ML — LOW (ref 10–40)
PHOSPHATE SERPL-MCNC: 2.4 MG/DL — LOW (ref 2.5–4.5)
PHOSPHATE SERPL-MCNC: 2.5 MG/DL — SIGNIFICANT CHANGE UP (ref 2.5–4.5)
PLATELET # BLD AUTO: 89 K/UL — LOW (ref 150–400)
PLATELET # BLD AUTO: 98 K/UL — LOW (ref 150–400)
POTASSIUM SERPL-MCNC: 3 MMOL/L — LOW (ref 3.5–5.3)
POTASSIUM SERPL-MCNC: 4 MMOL/L — SIGNIFICANT CHANGE UP (ref 3.5–5.3)
POTASSIUM SERPL-MCNC: 4 MMOL/L — SIGNIFICANT CHANGE UP (ref 3.5–5.3)
POTASSIUM SERPL-MCNC: 4.2 MMOL/L — SIGNIFICANT CHANGE UP (ref 3.5–5.3)
POTASSIUM SERPL-MCNC: 4.6 MMOL/L — SIGNIFICANT CHANGE UP (ref 3.5–5.3)
POTASSIUM SERPL-SCNC: 3 MMOL/L — LOW (ref 3.5–5.3)
POTASSIUM SERPL-SCNC: 4 MMOL/L — SIGNIFICANT CHANGE UP (ref 3.5–5.3)
POTASSIUM SERPL-SCNC: 4 MMOL/L — SIGNIFICANT CHANGE UP (ref 3.5–5.3)
POTASSIUM SERPL-SCNC: 4.2 MMOL/L — SIGNIFICANT CHANGE UP (ref 3.5–5.3)
POTASSIUM SERPL-SCNC: 4.6 MMOL/L — SIGNIFICANT CHANGE UP (ref 3.5–5.3)
PROT SERPL-MCNC: 6.4 G/DL — SIGNIFICANT CHANGE UP (ref 6–8.3)
RAPID RVP RESULT: DETECTED
RBC # BLD: 3.76 M/UL — LOW (ref 4.2–5.8)
RBC # BLD: 3.87 M/UL — LOW (ref 4.2–5.8)
RBC # FLD: 11.7 % — SIGNIFICANT CHANGE UP (ref 10.3–14.5)
RBC # FLD: 11.9 % — SIGNIFICANT CHANGE UP (ref 10.3–14.5)
RSV RNA SPEC QL NAA+PROBE: SIGNIFICANT CHANGE UP
RV+EV RNA SPEC QL NAA+PROBE: SIGNIFICANT CHANGE UP
SARS-COV-2 RNA SPEC QL NAA+PROBE: DETECTED
SODIUM SERPL-SCNC: 133 MMOL/L — LOW (ref 135–145)
SODIUM SERPL-SCNC: 134 MMOL/L — LOW (ref 135–145)
SODIUM SERPL-SCNC: 136 MMOL/L — SIGNIFICANT CHANGE UP (ref 135–145)
TRIGL SERPL-MCNC: 1017 MG/DL — HIGH
TRIGL SERPL-MCNC: 1256 MG/DL — HIGH
TRIGL SERPL-MCNC: 383 MG/DL — HIGH
TRIGL SERPL-MCNC: 588 MG/DL — HIGH
VIT B12 SERPL-MCNC: 989 PG/ML — HIGH (ref 200–900)
WBC # BLD: 10.31 K/UL — SIGNIFICANT CHANGE UP (ref 3.8–10.5)
WBC # BLD: 4.89 K/UL — SIGNIFICANT CHANGE UP (ref 3.8–10.5)
WBC # FLD AUTO: 10.31 K/UL — SIGNIFICANT CHANGE UP (ref 3.8–10.5)
WBC # FLD AUTO: 4.89 K/UL — SIGNIFICANT CHANGE UP (ref 3.8–10.5)

## 2021-03-07 PROCEDURE — 99291 CRITICAL CARE FIRST HOUR: CPT | Mod: 25

## 2021-03-07 RX ORDER — SODIUM CHLORIDE 9 MG/ML
1000 INJECTION, SOLUTION INTRAVENOUS
Refills: 0 | Status: DISCONTINUED | OUTPATIENT
Start: 2021-03-07 | End: 2021-03-07

## 2021-03-07 RX ORDER — DEXMEDETOMIDINE HYDROCHLORIDE IN 0.9% SODIUM CHLORIDE 4 UG/ML
0.2 INJECTION INTRAVENOUS
Qty: 400 | Refills: 0 | Status: DISCONTINUED | OUTPATIENT
Start: 2021-03-07 | End: 2021-03-07

## 2021-03-07 RX ORDER — PHENOBARBITAL 60 MG
130 TABLET ORAL ONCE
Refills: 0 | Status: DISCONTINUED | OUTPATIENT
Start: 2021-03-07 | End: 2021-03-07

## 2021-03-07 RX ORDER — DEXTROSE 50 % IN WATER 50 %
25 SYRINGE (ML) INTRAVENOUS ONCE
Refills: 0 | Status: DISCONTINUED | OUTPATIENT
Start: 2021-03-07 | End: 2021-03-11

## 2021-03-07 RX ORDER — DEXTROSE 50 % IN WATER 50 %
15 SYRINGE (ML) INTRAVENOUS ONCE
Refills: 0 | Status: DISCONTINUED | OUTPATIENT
Start: 2021-03-07 | End: 2021-03-11

## 2021-03-07 RX ORDER — PHENOBARBITAL 60 MG
65 TABLET ORAL ONCE
Refills: 0 | Status: DISCONTINUED | OUTPATIENT
Start: 2021-03-07 | End: 2021-03-07

## 2021-03-07 RX ORDER — PHENOBARBITAL 60 MG
60 TABLET ORAL ONCE
Refills: 0 | Status: DISCONTINUED | OUTPATIENT
Start: 2021-03-07 | End: 2021-03-07

## 2021-03-07 RX ORDER — ATORVASTATIN CALCIUM 80 MG/1
40 TABLET, FILM COATED ORAL AT BEDTIME
Refills: 0 | Status: DISCONTINUED | OUTPATIENT
Start: 2021-03-07 | End: 2021-03-11

## 2021-03-07 RX ORDER — INSULIN HUMAN 100 [IU]/ML
7 INJECTION, SOLUTION SUBCUTANEOUS
Qty: 100 | Refills: 0 | Status: DISCONTINUED | OUTPATIENT
Start: 2021-03-07 | End: 2021-03-07

## 2021-03-07 RX ORDER — POTASSIUM CHLORIDE 20 MEQ
40 PACKET (EA) ORAL EVERY 4 HOURS
Refills: 0 | Status: COMPLETED | OUTPATIENT
Start: 2021-03-07 | End: 2021-03-07

## 2021-03-07 RX ORDER — DEXTROSE 50 % IN WATER 50 %
50 SYRINGE (ML) INTRAVENOUS ONCE
Refills: 0 | Status: COMPLETED | OUTPATIENT
Start: 2021-03-07 | End: 2021-03-07

## 2021-03-07 RX ORDER — THIAMINE MONONITRATE (VIT B1) 100 MG
100 TABLET ORAL ONCE
Refills: 0 | Status: COMPLETED | OUTPATIENT
Start: 2021-03-07 | End: 2021-03-07

## 2021-03-07 RX ORDER — DEXTROSE 50 % IN WATER 50 %
12.5 SYRINGE (ML) INTRAVENOUS ONCE
Refills: 0 | Status: DISCONTINUED | OUTPATIENT
Start: 2021-03-07 | End: 2021-03-11

## 2021-03-07 RX ORDER — FENOFIBRATE,MICRONIZED 130 MG
145 CAPSULE ORAL DAILY
Refills: 0 | Status: DISCONTINUED | OUTPATIENT
Start: 2021-03-07 | End: 2021-03-11

## 2021-03-07 RX ORDER — PHENOBARBITAL 60 MG
64.8 TABLET ORAL EVERY 8 HOURS
Refills: 0 | Status: DISCONTINUED | OUTPATIENT
Start: 2021-03-09 | End: 2021-03-09

## 2021-03-07 RX ORDER — POTASSIUM CHLORIDE 20 MEQ
10 PACKET (EA) ORAL
Refills: 0 | Status: COMPLETED | OUTPATIENT
Start: 2021-03-07 | End: 2021-03-07

## 2021-03-07 RX ORDER — PHENOBARBITAL 60 MG
64.8 TABLET ORAL EVERY 6 HOURS
Refills: 0 | Status: DISCONTINUED | OUTPATIENT
Start: 2021-03-08 | End: 2021-03-09

## 2021-03-07 RX ORDER — OMEGA-3 ACID ETHYL ESTERS 1 G
2 CAPSULE ORAL
Refills: 0 | Status: DISCONTINUED | OUTPATIENT
Start: 2021-03-07 | End: 2021-03-11

## 2021-03-07 RX ORDER — PHENOBARBITAL 60 MG
130 TABLET ORAL EVERY 6 HOURS
Refills: 0 | Status: DISCONTINUED | OUTPATIENT
Start: 2021-03-07 | End: 2021-03-07

## 2021-03-07 RX ORDER — DEXTROSE 50 % IN WATER 50 %
25 SYRINGE (ML) INTRAVENOUS ONCE
Refills: 0 | Status: COMPLETED | OUTPATIENT
Start: 2021-03-07 | End: 2021-03-07

## 2021-03-07 RX ORDER — GLUCAGON INJECTION, SOLUTION 0.5 MG/.1ML
1 INJECTION, SOLUTION SUBCUTANEOUS ONCE
Refills: 0 | Status: DISCONTINUED | OUTPATIENT
Start: 2021-03-07 | End: 2021-03-11

## 2021-03-07 RX ORDER — INSULIN HUMAN 100 [IU]/ML
2 INJECTION, SOLUTION SUBCUTANEOUS
Qty: 100 | Refills: 0 | Status: DISCONTINUED | OUTPATIENT
Start: 2021-03-07 | End: 2021-03-07

## 2021-03-07 RX ADMIN — INSULIN HUMAN 2 UNIT(S)/HR: 100 INJECTION, SOLUTION SUBCUTANEOUS at 08:46

## 2021-03-07 RX ADMIN — INSULIN HUMAN 7 UNIT(S)/HR: 100 INJECTION, SOLUTION SUBCUTANEOUS at 01:00

## 2021-03-07 RX ADMIN — SODIUM CHLORIDE 150 MILLILITER(S): 9 INJECTION, SOLUTION INTRAVENOUS at 02:00

## 2021-03-07 RX ADMIN — SODIUM CHLORIDE 200 MILLILITER(S): 9 INJECTION, SOLUTION INTRAVENOUS at 05:44

## 2021-03-07 RX ADMIN — SODIUM CHLORIDE 175 MILLILITER(S): 9 INJECTION, SOLUTION INTRAVENOUS at 05:21

## 2021-03-07 RX ADMIN — Medication 100 MILLIEQUIVALENT(S): at 09:48

## 2021-03-07 RX ADMIN — DEXMEDETOMIDINE HYDROCHLORIDE IN 0.9% SODIUM CHLORIDE 3.41 MICROGRAM(S)/KG/HR: 4 INJECTION INTRAVENOUS at 05:21

## 2021-03-07 RX ADMIN — ATORVASTATIN CALCIUM 40 MILLIGRAM(S): 80 TABLET, FILM COATED ORAL at 21:57

## 2021-03-07 RX ADMIN — Medication 50 MILLILITER(S): at 05:39

## 2021-03-07 RX ADMIN — CHLORHEXIDINE GLUCONATE 1 APPLICATION(S): 213 SOLUTION TOPICAL at 12:43

## 2021-03-07 RX ADMIN — MORPHINE SULFATE 4 MILLIGRAM(S): 50 CAPSULE, EXTENDED RELEASE ORAL at 03:07

## 2021-03-07 RX ADMIN — ENOXAPARIN SODIUM 40 MILLIGRAM(S): 100 INJECTION SUBCUTANEOUS at 21:57

## 2021-03-07 RX ADMIN — Medication 40 MILLIEQUIVALENT(S): at 12:43

## 2021-03-07 RX ADMIN — Medication 65 MILLIGRAM(S): at 03:28

## 2021-03-07 RX ADMIN — MORPHINE SULFATE 4 MILLIGRAM(S): 50 CAPSULE, EXTENDED RELEASE ORAL at 06:32

## 2021-03-07 RX ADMIN — INSULIN HUMAN 3 UNIT(S)/HR: 100 INJECTION, SOLUTION SUBCUTANEOUS at 08:05

## 2021-03-07 RX ADMIN — Medication 145 MILLIGRAM(S): at 12:44

## 2021-03-07 RX ADMIN — Medication 25 MILLILITER(S): at 02:09

## 2021-03-07 RX ADMIN — Medication 2 GRAM(S): at 05:21

## 2021-03-07 RX ADMIN — Medication 130 MILLIGRAM(S): at 17:13

## 2021-03-07 RX ADMIN — INSULIN HUMAN 2 UNIT(S)/HR: 100 INJECTION, SOLUTION SUBCUTANEOUS at 19:25

## 2021-03-07 RX ADMIN — DEXMEDETOMIDINE HYDROCHLORIDE IN 0.9% SODIUM CHLORIDE 3.41 MICROGRAM(S)/KG/HR: 4 INJECTION INTRAVENOUS at 06:31

## 2021-03-07 RX ADMIN — DEXMEDETOMIDINE HYDROCHLORIDE IN 0.9% SODIUM CHLORIDE 3.41 MICROGRAM(S)/KG/HR: 4 INJECTION INTRAVENOUS at 08:04

## 2021-03-07 RX ADMIN — INSULIN HUMAN 7 UNIT(S)/HR: 100 INJECTION, SOLUTION SUBCUTANEOUS at 02:01

## 2021-03-07 RX ADMIN — Medication 2 GRAM(S): at 17:14

## 2021-03-07 RX ADMIN — Medication 100 MILLIEQUIVALENT(S): at 08:05

## 2021-03-07 RX ADMIN — SODIUM CHLORIDE 150 MILLILITER(S): 9 INJECTION, SOLUTION INTRAVENOUS at 17:14

## 2021-03-07 RX ADMIN — Medication 130 MILLIGRAM(S): at 23:28

## 2021-03-07 RX ADMIN — SODIUM CHLORIDE 150 MILLILITER(S): 9 INJECTION, SOLUTION INTRAVENOUS at 19:24

## 2021-03-07 RX ADMIN — Medication 130 MILLIGRAM(S): at 12:43

## 2021-03-07 RX ADMIN — Medication 100 MILLIEQUIVALENT(S): at 08:47

## 2021-03-07 RX ADMIN — Medication 100 MILLIGRAM(S): at 06:31

## 2021-03-07 RX ADMIN — SODIUM CHLORIDE 175 MILLILITER(S): 9 INJECTION, SOLUTION INTRAVENOUS at 02:17

## 2021-03-07 RX ADMIN — Medication 130 MILLIGRAM(S): at 05:20

## 2021-03-07 NOTE — PROGRESS NOTE ADULT - ASSESSMENT
52 y/o male with pmh of hyperlipidemia and previous admission of pancreatitis in the setting of hypertriglyceridemia admitted for recurrent pancreatitis in the setting of hypertriglyceridemia, requiring an insulin drip, transferred to ICU for further assessment and management.     NEUROLOGY:  AAOx4    PULMONARY:  # COVID +   Asymptomatic   Will hold off on monoclonal Ab infusion given pt is 52 y/o, BMI of 24, only w/ pmh of HLD and is not immunosuppressed  Monitor pulse oximetry     CARDIOLOGY:  No active issues    GASTROINTESTINAL:  # Acute pancreatitis  Abdominal discomfort w/ NBNB episodes of emesis  Lipase elevated at 227, pseudohyponatremia  CT A&P w/ IV contrast demonstrating fat stranding, inflammatory changes surrounding the pancreatic head, and infiltration of the mesenteric fat, possibly suggestive of acute interstitial edematous pancreatitis.  Likely in the setting of hypertriglyceridemia and alcohol use  C/w D10-0.45%NS IVF  For symptomatic pain, morphine 4 mg q4hrs  Remain NPO, advance diet when off insulin gtt  Insulin gtt started  Monitor FSq1hr  Monitor and replete BMPq6, K, mag, phos, and calcium  Started on Fenofibrate 145 mg QD, Lovaza 2 g BID, and atorvastatin 40 mg QD  Monitor for signs of alcohol withdrawal, on phenobarbital 130 mg IVP q4hrs    # Transaminitis  Elevated in setting of elevated transaminases  Suggestive of alcohol use  Trend LFTs    RENAL/:  No active issues    INFECTIOUS DISEASE:  # Leukocytosis  In the setting of acute pancreatitis  Previously noted to have leukocytosis on prior admission with pancreatitis  Will monitor trend, if febrile, will consider adding Abx    HEMATOLOGY:  No active issues    ENDOCRINE  # Hypertriglyceridemia  Elevated triglycerides: 3127  Start on Insulin gtt  Endocrinology consulted for further management    PROPHYLAXIS  Lovenox    ETHICS/DISPOSITION:  Full code    LINES/DRAINS/TUBES/DEVICES:    Lilian Cooper MD, PGY1  Pager: [facility]: [phone]/[facility]: [phone]    50 y/o male with pmh of hyperlipidemia and previous admission of pancreatitis in the setting of hypertriglyceridemia admitted for recurrent pancreatitis in the setting of hypertriglyceridemia, requiring an insulin drip, transferred to ICU for further assessment and management.     NEUROLOGY:  AAOx4  Precedex gtt and phenobarb 130 q6h for agitation/concern for EtOH w/d    PULMONARY:  # COVID +   Asymptomatic   Will hold off on monoclonal Ab infusion given pt is 50 y/o, BMI of 24, only w/ pmh of HLD and is not immunosuppressed  Monitor pulse oximetry     CARDIOLOGY:  No active issues    GASTROINTESTINAL:  # Acute pancreatitis  Abdominal discomfort w/ NBNB episodes of emesis  Lipase elevated at 227, pseudohyponatremia  CT A&P w/ IV contrast demonstrating fat stranding, inflammatory changes surrounding the pancreatic head, and infiltration of the mesenteric fat, possibly suggestive of acute interstitial edematous pancreatitis.  Likely in the setting of hypertriglyceridemia and alcohol use  C/w D10-0.45%NS IVF  For symptomatic pain, morphine 4 mg q4hrs  Remain NPO, advance diet when off insulin gtt  Insulin gtt down to 2u/hr  Monitor FSq1hr  Monitor and replete BMPq6, K, mag, phos, and calcium  Started on Fenofibrate 145 mg QD, Lovaza 2 g BID, and atorvastatin 40 mg QD  Monitor for signs of alcohol withdrawal, on phenobarbital 130 mg IVP q4hrs    # Transaminitis  Elevated in setting of elevated transaminases  Suggestive of alcohol use  Trend LFTs    RENAL/:  No active issues    INFECTIOUS DISEASE:  # Leukocytosis  In the setting of acute pancreatitis  Previously noted to have leukocytosis on prior admission with pancreatitis  Will monitor trend, if febrile, will consider adding Abx    HEMATOLOGY:  No active issues    ENDOCRINE  # Hypertriglyceridemia  Elevated triglycerides: 3127  Start on Insulin gtt  Endocrinology consulted for further management    PROPHYLAXIS  Lovenox    ETHICS/DISPOSITION:  Full code    LINES/DRAINS/TUBES/DEVICES:    Lilian Cooper MD, PGY1  Pager: [facility]: [phone]/[facility]: [id number]

## 2021-03-07 NOTE — CHART NOTE - NSCHARTNOTEFT_GEN_A_CORE
MICU Transfer Note    Transfer from: MICU    Transfer to: ( x) Medicine [facility]   (  ) Telemetry     (   ) RCU        (    ) Palliative         (   ) Stroke Unit          (   ) __________________    Accepting Physician:  Signout given to:     MICU COURSE:  50 y/o male with pmh of hyperlipidemia and previous admission of pancreatitis in the setting of hypertriglyceridemia who presented to the ED for abdominal pain with nausea and episodes of emesis. The pt has been having abdominal discomfort described as a tight feeling with back pain for the past few days, however endorses that today it was a 10/10. Also noted to having 2 episodes of NBNB emesis. He also endorses alcohol use, 1 pint of gin 3x/week, with his last drink of 1 pint of gin approximately 2 days ago. While in the ED, the pt was found to have an elevated lipase of 227 and hypertriglyceridemia of 3127 with a CT of the abdomen and pelvis demonstrating acute interstitial edematous pancreatitis. The pt received 1L NS Bolus and 1L LR Bolus and morphine 4 mg IVP x2. He was recently admitted to the MICU on 9/2020 for pancreatitis secondary to hypertriglyceridemia with labs demonstrating triglycerides of 3801, lipase 774.8, and hyponatremia 122. He received an insulin gtt and IVF with an improvement of his triglycerides. Pt admitted to MICU for insulin gtt in the setting of hypertriglyceridemia and further monitoring.    In the MICU, patient was briefly placed on precedex gtt and started on phenobarb taper. Patient's triglyceride levels came down and patient's insulin gtt was weaned off. Precedex was weaned off. Patients is medically stable for transfer to floors.       ASSESSMENT & PLAN:   50 y/o male with pmh of hyperlipidemia and previous admission of pancreatitis in the setting of hypertriglyceridemia admitted for recurrent pancreatitis in the setting of hypertriglyceridemia, insulin gtt now weaned off.     NEUROLOGY:  -phenobarb 130 q6h for agitation/concern for EtOH w/d. to be completed on 3/9/21    PULMONARY:  # COVID +   -Asymptomatic   -Will hold off on monoclonal Ab infusion given pt is 50 y/o, BMI of 24, only w/ pmh of HLD and is not immunosuppressed    GASTROINTESTINAL:  # Acute pancreatitis  -Abdominal discomfort w/ NBNB episodes of emesis w/Lipase 227  -CT A&P w/ IV contrast demonstrating fat stranding, inflammatory changes surrounding the pancreatic head, and infiltration of the mesenteric fat, possibly suggestive of acute interstitial edematous pancreatitis.  -Likely 2/2 hypertriglyceridemia and etoh use  -now off insulin gtt and fluids  -bmp q6h  -follow endocrinology recs for hypertriglyceridemia management   -For symptomatic pain, morphine 4 mg q4hrs  -Started on Fenofibrate 145 mg QD, Lovaza 2 g BID, and atorvastatin 40 mg QD    ENDOCRINE  # Hypertriglyceridemia  -Elevated triglycerides: 3127 now downtrended  -Endocrinology consulted for further management    PROPHYLAXIS  Lovenox    FOR FOLLOW UP:  - BMP at 12am to replete lytes and check TG  - f/u endocrinology recs (consulted over the weekend)  - c/w phenobarb 130q6h until 3/9/2021 for etoh withdrawal MICU Transfer Note    Transfer from: MICU    Transfer to: ( x) Medicine [facility]   (  ) Telemetry     (   ) RCU        (    ) Palliative         (   ) Stroke Unit          (   ) __________________    Accepting Physician: Dr. Espinoza (hospitalist)  Signout given to: Dr. Espinoza, MAR, [facility], floor NP (Ryanne pager #[phone] at 1015PM 3/7/21)    MICU COURSE:  50 y/o male with pmh of hyperlipidemia and previous admission of pancreatitis in the setting of hypertriglyceridemia who presented to the ED for abdominal pain with nausea and episodes of emesis. The pt has been having abdominal discomfort described as a tight feeling with back pain for the past few days, however endorses that today it was a 10/10. Also noted to having 2 episodes of NBNB emesis. He also endorses alcohol use, 1 pint of gin 3x/week, with his last drink of 1 pint of gin approximately 2 days ago. While in the ED, the pt was found to have an elevated lipase of 227 and hypertriglyceridemia of 3127 with a CT of the abdomen and pelvis demonstrating acute interstitial edematous pancreatitis. The pt received 1L NS Bolus and 1L LR Bolus and morphine 4 mg IVP x2. He was recently admitted to the MICU on 9/2020 for pancreatitis secondary to hypertriglyceridemia with labs demonstrating triglycerides of 3801, lipase 774.8, and hyponatremia 122. He received an insulin gtt and IVF with an improvement of his triglycerides. Pt admitted to MICU for insulin gtt in the setting of hypertriglyceridemia and further monitoring.    In the MICU, patient was briefly placed on precedex gtt and started on phenobarb taper. Patient's triglyceride levels came down and patient's insulin gtt was weaned off. Precedex was weaned off. Patients is medically stable for transfer to floors.       ASSESSMENT & PLAN:   50 y/o male with pmh of hyperlipidemia and previous admission of pancreatitis in the setting of hypertriglyceridemia admitted for recurrent pancreatitis in the setting of hypertriglyceridemia, insulin gtt now weaned off.     NEUROLOGY:  -phenobarb 130 q6h for agitation/concern for EtOH w/d. to be completed on 3/9/21    PULMONARY:  # COVID +   -Asymptomatic   -Will hold off on monoclonal Ab infusion given pt is 50 y/o, BMI of 24, only w/ pmh of HLD and is not immunosuppressed    GASTROINTESTINAL:  # Acute pancreatitis  -Abdominal discomfort w/ NBNB episodes of emesis w/Lipase 227  -CT A&P w/ IV contrast demonstrating fat stranding, inflammatory changes surrounding the pancreatic head, and infiltration of the mesenteric fat, possibly suggestive of acute interstitial edematous pancreatitis.  -Likely 2/2 hypertriglyceridemia and etoh use  -now off insulin gtt and fluids  -bmp q6h  -follow endocrinology recs for hypertriglyceridemia management   -For symptomatic pain, morphine 4 mg q4hrs  -Started on Fenofibrate 145 mg QD, Lovaza 2 g BID, and atorvastatin 40 mg QD    ENDOCRINE  # Hypertriglyceridemia  -Elevated triglycerides: 3127 now downtrended  -Endocrinology consulted for further management    PROPHYLAXIS  Lovenox    FOR FOLLOW UP:  - BMP at 12am to replete lytes and check TG  - f/u endocrinology recs (consulted over the weekend)  - c/w phenobarb 130q6h until 3/9/2021 for etoh withdrawal

## 2021-03-07 NOTE — PROGRESS NOTE ADULT - SUBJECTIVE AND OBJECTIVE BOX
Progress Note    VERONIKA LOERA 51y (1970) Male [id number]  03-06-21 (1d)    Dr. Chan Retana, [facility] PGY1  Pager# [phone]    Chief Complaint: Pancreatitis    Subjective:  No acute events overnight. Patient seen and examined at bedside.    PAST MEDICAL & SURGICAL HISTORY:  Dyslipidemia [E78.5]    Hypertension [I10]    No significant past surgical history [[id number]]      atorvastatin 40 milliGRAM(s) Oral at bedtime  chlorhexidine 4% Liquid 1 Application(s) Topical <User Schedule>  dexMEDEtomidine Infusion 0.2 MICROgram(s)/kG/Hr IV Continuous <Continuous>  dextrose 10% + sodium chloride 0.45% 1000 milliLiter(s) IV Continuous <Continuous>  dextrose 40% Gel 15 Gram(s) Oral once  dextrose 50% Injectable 25 Gram(s) IV Push once  dextrose 50% Injectable 25 Gram(s) IV Push once  dextrose 50% Injectable 12.5 Gram(s) IV Push once  enoxaparin Injectable 40 milliGRAM(s) SubCutaneous at bedtime  fenofibrate Tablet 145 milliGRAM(s) Oral daily  glucagon  Injectable 1 milliGRAM(s) IntraMuscular once  insulin regular Infusion 3 Unit(s)/Hr IV Continuous <Continuous>  morphine  - Injectable 4 milliGRAM(s) IV Push every 4 hours PRN  omega-3-Acid Ethyl Esters 2 Gram(s) Oral two times a day  PHENobarbital Injectable 130 milliGRAM(s) IV Push every 6 hours  potassium chloride  10 mEq/100 mL IVPB 10 milliEquivalent(s) IV Intermittent every 1 hour    Objective:  T(C): 36.7 (03-07-21 @ 04:00), Max: 37.5 (03-06-21 @ 20:29)  HR: 90 (03-07-21 @ 06:00) (81 - 115)  BP: 127/74 (03-07-21 @ 06:00) (127/74 - 173/101)  RR: 16 (03-07-21 @ 06:00) (11 - 18)  SpO2: 92% (03-07-21 @ 06:00) (92% - 99%)    PE:  GENERAL: NAD, well-developed  HEAD:  Atraumatic, Normocephalic  EYES: EOMI, PERRLA, conjunctiva and sclera clear  NECK: Supple, No JVD  CHEST/LUNG: Clear to auscultation bilaterally; No wheeze  HEART: Regular rate and rhythm; No murmurs, rubs, or gallops  ABDOMEN: Soft, tenderness to palpation in upper quadrants and epigastrium. Nondistended; Bowel sounds present  EXTREMITIES:  2+ Peripheral Pulses, No clubbing, cyanosis, or edema  NEUROLOGY: AAOx4, no focal deficits  SKIN: No rashes or lesions    03-06-21 @ 07:01  -  03-07-21 @ 07:00  --------------------------------------------------------  IN: 1532.4 mL / OUT: 150 mL / NET: 1382.4 mL        CAPILLARY BLOOD GLUCOSE      (03-07 @ 05:43)                      13.4  10.31 )-----------( 98                 39.3    Neutrophils = -- (--%)  Lymphocytes = -- (--%)  Eosinophils = -- (--%)  Basophils = -- (--%)  Monocytes = -- (--%)  Bands = --%    03-07    136  |  100  |  8   ----------------------------<  54<LL>  3.0<L>   |  21<L>  |  0.83    Ca    8.7      07 Mar 2021 05:43  Phos  2.5     03-07  Mg     1.6     03-07    TPro  TNP  /  Alb  3.8  /  TBili  1.8<H>  /  DBili  x   /  AST  352<H>  /  ALT  172<H>  /  AlkPhos  84  03-06          RVP:(03-06 @ 21:47)  Detected      Venous Blood Gas:  03-06 @ 23:31  7.41/41/86/26/96.3  VBG Lactate: 1.3  Venous Blood Gas:  03-06 @ 18:25  7.43/42/36/26/68.5  VBG Lactate: 2.6        Tox:             WBC Trend: 10.31<--, 12.55<--    Hb Trend: 13.4<--, 16.0<--        New imaging in last 24 hours:  Consult notes reviewed:

## 2021-03-08 DIAGNOSIS — E78.1 PURE HYPERGLYCERIDEMIA: ICD-10-CM

## 2021-03-08 DIAGNOSIS — R74.01 ELEVATION OF LEVELS OF LIVER TRANSAMINASE LEVELS: ICD-10-CM

## 2021-03-08 DIAGNOSIS — K85.90 ACUTE PANCREATITIS WITHOUT NECROSIS OR INFECTION, UNSPECIFIED: ICD-10-CM

## 2021-03-08 DIAGNOSIS — F10.20 ALCOHOL DEPENDENCE, UNCOMPLICATED: ICD-10-CM

## 2021-03-08 DIAGNOSIS — F10.239 ALCOHOL DEPENDENCE WITH WITHDRAWAL, UNSPECIFIED: ICD-10-CM

## 2021-03-08 DIAGNOSIS — Z29.9 ENCOUNTER FOR PROPHYLACTIC MEASURES, UNSPECIFIED: ICD-10-CM

## 2021-03-08 LAB
ALBUMIN SERPL ELPH-MCNC: 3.1 G/DL — LOW (ref 3.3–5)
ALP SERPL-CCNC: 92 U/L — SIGNIFICANT CHANGE UP (ref 40–120)
ALT FLD-CCNC: 108 U/L — HIGH (ref 4–41)
ANION GAP SERPL CALC-SCNC: 9 MMOL/L — SIGNIFICANT CHANGE UP (ref 7–14)
AST SERPL-CCNC: 105 U/L — HIGH (ref 4–40)
BASOPHILS # BLD AUTO: 0.01 K/UL — SIGNIFICANT CHANGE UP (ref 0–0.2)
BASOPHILS NFR BLD AUTO: 0.2 % — SIGNIFICANT CHANGE UP (ref 0–2)
BILIRUB SERPL-MCNC: 1.7 MG/DL — HIGH (ref 0.2–1.2)
BUN SERPL-MCNC: 4 MG/DL — LOW (ref 7–23)
CALCIUM SERPL-MCNC: 9.1 MG/DL — SIGNIFICANT CHANGE UP (ref 8.4–10.5)
CHLORIDE SERPL-SCNC: 100 MMOL/L — SIGNIFICANT CHANGE UP (ref 98–107)
CO2 SERPL-SCNC: 24 MMOL/L — SIGNIFICANT CHANGE UP (ref 22–31)
CREAT SERPL-MCNC: 0.8 MG/DL — SIGNIFICANT CHANGE UP (ref 0.5–1.3)
EOSINOPHIL # BLD AUTO: 0.15 K/UL — SIGNIFICANT CHANGE UP (ref 0–0.5)
EOSINOPHIL NFR BLD AUTO: 3.4 % — SIGNIFICANT CHANGE UP (ref 0–6)
GLUCOSE BLDC GLUCOMTR-MCNC: 101 MG/DL — HIGH (ref 70–99)
GLUCOSE BLDC GLUCOMTR-MCNC: 114 MG/DL — HIGH (ref 70–99)
GLUCOSE BLDC GLUCOMTR-MCNC: 122 MG/DL — HIGH (ref 70–99)
GLUCOSE BLDC GLUCOMTR-MCNC: 75 MG/DL — SIGNIFICANT CHANGE UP (ref 70–99)
GLUCOSE BLDC GLUCOMTR-MCNC: 95 MG/DL — SIGNIFICANT CHANGE UP (ref 70–99)
GLUCOSE BLDC GLUCOMTR-MCNC: 99 MG/DL — SIGNIFICANT CHANGE UP (ref 70–99)
GLUCOSE SERPL-MCNC: 86 MG/DL — SIGNIFICANT CHANGE UP (ref 70–99)
HCT VFR BLD CALC: 40.6 % — SIGNIFICANT CHANGE UP (ref 39–50)
HGB BLD-MCNC: 12.9 G/DL — LOW (ref 13–17)
IANC: 2.05 K/UL — SIGNIFICANT CHANGE UP (ref 1.5–8.5)
IMM GRANULOCYTES NFR BLD AUTO: 0.2 % — SIGNIFICANT CHANGE UP (ref 0–1.5)
LYMPHOCYTES # BLD AUTO: 1.81 K/UL — SIGNIFICANT CHANGE UP (ref 1–3.3)
LYMPHOCYTES # BLD AUTO: 41 % — SIGNIFICANT CHANGE UP (ref 13–44)
MAGNESIUM SERPL-MCNC: 1.9 MG/DL — SIGNIFICANT CHANGE UP (ref 1.6–2.6)
MCHC RBC-ENTMCNC: 31.8 GM/DL — LOW (ref 32–36)
MCHC RBC-ENTMCNC: 33.6 PG — SIGNIFICANT CHANGE UP (ref 27–34)
MCV RBC AUTO: 105.7 FL — HIGH (ref 80–100)
MONOCYTES # BLD AUTO: 0.38 K/UL — SIGNIFICANT CHANGE UP (ref 0–0.9)
MONOCYTES NFR BLD AUTO: 8.6 % — SIGNIFICANT CHANGE UP (ref 2–14)
NEUTROPHILS # BLD AUTO: 2.05 K/UL — SIGNIFICANT CHANGE UP (ref 1.8–7.4)
NEUTROPHILS NFR BLD AUTO: 46.6 % — SIGNIFICANT CHANGE UP (ref 43–77)
NRBC # BLD: 0 /100 WBCS — SIGNIFICANT CHANGE UP
NRBC # FLD: 0 K/UL — SIGNIFICANT CHANGE UP
PHOSPHATE SERPL-MCNC: 2.6 MG/DL — SIGNIFICANT CHANGE UP (ref 2.5–4.5)
PLATELET # BLD AUTO: 83 K/UL — LOW (ref 150–400)
POTASSIUM SERPL-MCNC: 3.9 MMOL/L — SIGNIFICANT CHANGE UP (ref 3.5–5.3)
POTASSIUM SERPL-SCNC: 3.9 MMOL/L — SIGNIFICANT CHANGE UP (ref 3.5–5.3)
PROT SERPL-MCNC: 6.3 G/DL — SIGNIFICANT CHANGE UP (ref 6–8.3)
RBC # BLD: 3.84 M/UL — LOW (ref 4.2–5.8)
RBC # FLD: 11.8 % — SIGNIFICANT CHANGE UP (ref 10.3–14.5)
SARS-COV-2 IGG SERPL QL IA: NEGATIVE — SIGNIFICANT CHANGE UP
SARS-COV-2 IGM SERPL IA-ACNC: 0.08 INDEX — SIGNIFICANT CHANGE UP
SODIUM SERPL-SCNC: 133 MMOL/L — LOW (ref 135–145)
TRIGL SERPL-MCNC: 249 MG/DL — HIGH
TRIGL SERPL-MCNC: 417 MG/DL — HIGH
WBC # BLD: 4.41 K/UL — SIGNIFICANT CHANGE UP (ref 3.8–10.5)
WBC # FLD AUTO: 4.41 K/UL — SIGNIFICANT CHANGE UP (ref 3.8–10.5)

## 2021-03-08 PROCEDURE — 99233 SBSQ HOSP IP/OBS HIGH 50: CPT

## 2021-03-08 PROCEDURE — 99254 IP/OBS CNSLTJ NEW/EST MOD 60: CPT

## 2021-03-08 RX ADMIN — Medication 64.8 MILLIGRAM(S): at 17:53

## 2021-03-08 RX ADMIN — Medication 145 MILLIGRAM(S): at 12:06

## 2021-03-08 RX ADMIN — CHLORHEXIDINE GLUCONATE 1 APPLICATION(S): 213 SOLUTION TOPICAL at 06:37

## 2021-03-08 RX ADMIN — ENOXAPARIN SODIUM 40 MILLIGRAM(S): 100 INJECTION SUBCUTANEOUS at 21:45

## 2021-03-08 RX ADMIN — Medication 2 GRAM(S): at 17:53

## 2021-03-08 RX ADMIN — Medication 64.8 MILLIGRAM(S): at 12:06

## 2021-03-08 RX ADMIN — ATORVASTATIN CALCIUM 40 MILLIGRAM(S): 80 TABLET, FILM COATED ORAL at 21:45

## 2021-03-08 RX ADMIN — Medication 64.8 MILLIGRAM(S): at 06:36

## 2021-03-08 RX ADMIN — Medication 2 GRAM(S): at 06:36

## 2021-03-08 NOTE — CONSULT NOTE ADULT - ASSESSMENT
51 yr old M with PMH of HTG induced pancreatitis and ETOH use here with acute pancreatitis. No known Hx of DM.

## 2021-03-08 NOTE — CHART NOTE - NSCHARTNOTEFT_GEN_A_CORE
MAR Accept Note  Transfer to:  Medicine  Accepting Attending Physician:  Dr. Connell  Assigned Room:      Patient seen and examined.   Labs and data reviewed.   No findings precluding transfer of service.       HPI/MICU COURSE:   Please refer to MICU transfer note for full details. Briefly, this is a      FOR FOLLOW-UP:      Nancy Ellis, PGY3  MAR MAR Accept Note  Transfer to:  Medicine  Accepting Attending Physician:  Dr. Connell  Assigned Room:      Patient seen and examined.   Labs and data reviewed.   No findings precluding transfer of service.       HPI/MICU COURSE:   50 y/o male with pmh of hyperlipidemia and previous admission of pancreatitis in the setting of hypertriglyceridemia who presented to the ED for abdominal pain with nausea and episodes of emesis. The pt has been having abdominal discomfort described as a tight feeling with back pain for the past few days, however endorses that today it was a 10/10. Also noted to having 2 episodes of NBNB emesis. He also endorses alcohol use, 1 pint of gin 3x/week, with his last drink of 1 pint of gin approximately 2 days ago. While in the ED, the pt was found to have an elevated lipase of 227 and hypertriglyceridemia of 3127 with a CT of the abdomen and pelvis demonstrating acute interstitial edematous pancreatitis. The pt received 1L NS Bolus and 1L LR Bolus and morphine 4 mg IVP x2. He was recently admitted to the MICU on 9/2020 for pancreatitis secondary to hypertriglyceridemia with labs demonstrating triglycerides of 3801, lipase 774.8, and hyponatremia 122. He received an insulin gtt and IVF with an improvement of his triglycerides. Pt admitted to MICU for insulin gtt in the setting of hypertriglyceridemia and further monitoring.    In the MICU, patient was briefly placed on precedex gtt and started on phenobarb taper. Patient's triglyceride levels came down and patient's insulin gtt was weaned off. Precedex was weaned off. Patients is medically stable for transfer to floors.       FOR FOLLOW-UP:  - BMP at 12am to replete lytes and check TG  - f/u endocrinology recs (consulted over the weekend)  - c/w phenobarb 130q6h until 3/9/2021 for etoh withdrawal.    Nancy Ellis, PGY3  MAR

## 2021-03-08 NOTE — CONSULT NOTE ADULT - PROBLEM SELECTOR RECOMMENDATION 9
Please recheck A1C  Continue to monitor TG q 12 hours  Continue fenofibrate 145mg daily, atorvastatin 40mg daily and Omega-3 2 g BID  Monitor Liver Function Tests carefully as inpatient and outpatient  Patient can follow up at Lipid center Located [address] [place] [address]   [phone]

## 2021-03-08 NOTE — PROGRESS NOTE ADULT - PROBLEM SELECTOR PLAN 1
Symptoms resolved, not requiring PRN pain control  s/p insulin gtt   will need to be discharged on fenofibrate, lovaza, atorvastatin

## 2021-03-08 NOTE — PROGRESS NOTE ADULT - SUBJECTIVE AND OBJECTIVE BOX
Kee Pinon M.D.  Hospitalist 8am-7pm  Division of Hospital Medicine  [facility]  cell [phone]  pager [phone]    Patient is a 51y old  Male who presents with a chief complaint of Pancreatitis (08 Mar 2021 10:00)    SUBJECTIVE / OVERNIGHT EVENTS: Patient seen and examined. No acute overnight events, no subjective complaints. No abdominal pain, N/V/D, having normal bowel movements, Ated full meal of pancakes and eggs this AM, tolerating well.   Patient denies chest pain, shortness of breath, fevers.  Tele events, if applicable:  SPO2:8% on RA    OBJECTIVE:  Vital Signs Last 24 Hrs  T(C): 36.6 (08 Mar 2021 06:26), Max: 37 (07 Mar 2021 20:00)  T(F): 97.8 (08 Mar 2021 06:26), Max: 98.6 (07 Mar 2021 20:00)  HR: 64 (08 Mar 2021 06:26) (57 - 74)  BP: 136/88 (08 Mar 2021 06:26) (128/78 - 148/85)  BP(mean): 101 (07 Mar 2021 23:00) (90 - 104)  RR: 17 (08 Mar 2021 06:26) (14 - 19)  SpO2: 99% (08 Mar 2021 06:26) (96% - 100%)    I&O's Summary    07 Mar 2021 07:01  -  08 Mar 2021 07:00  --------------------------------------------------------  IN: 2878 mL / OUT: 3250 mL / NET: -372 mL    08 Mar 2021 07:01  -  08 Mar 2021 13:08  --------------------------------------------------------  IN: 0 mL / OUT: 350 mL / NET: -350 mL      Physical Examination:  GEN: Well appearing, in NAD  EYES: PERRL, EOMI, no scleral icterus  HEAD/NECK: Normocephalic, atraumatic  RESP: no accessory muscle use, B/L air entry, CTAB   CARDIO: regular rate/rhythm  ABD: soft, NT, ND, +BS  PSYCH: A&Ox3, normal affect  SKIN: warm, dry, in tact, no rashes  NEURO: no focal neurologic deficits appreciated  EXT: no lower extremity edema, no cyanosis  VASC: good peripheral pulses      (03-08 @ 03:28)                      12.9  4.41 )-----------( 83                 40.6    Neutrophils = 2.05 (46.6%)  Lymphocytes = 1.81 (41.0%)  Eosinophils = 0.15 (3.4%)  Basophils = 0.01 (0.2%)  Monocytes = 0.38 (8.6%)  Bands = --%    03-08    133<L>  |  100  |  4<L>  ----------------------------<  86  3.9   |  24  |  0.80    Ca    9.1      08 Mar 2021 03:28  Phos  2.6     03-08  Mg     1.9     03-08    TPro  6.3  /  Alb  3.1<L>  /  TBili  1.7<H>  /  DBili  x   /  AST  105<H>  /  ALT  108<H>  /  AlkPhos  92  03-08      RVP:(03-06 @ 21:47)  Detected      I&O's Summary    07 Mar 2021 07:01  -  08 Mar 2021 07:00  --------------------------------------------------------  IN: 2878 mL / OUT: 3250 mL / NET: -372 mL    08 Mar 2021 07:01  -  08 Mar 2021 13:08  --------------------------------------------------------  IN: 0 mL / OUT: 350 mL / NET: -350 mL      IMPROVE VTE Individual Risk Assessment          RISK                                                          Points  [  ] Previous VTE                                                3  [  ] Thrombophilia                                             2  [  ] Lower limb paralysis                                   2        (unable to hold up >15 seconds)    [  ] Current Cancer                                             2         (within 6 months)  [  ] Immobilization > 24 hrs                              1  [  ] ICU/CCU stay > 24 hours                             1  [  ] Age > 60                                                         1    IMPROVE VTE Score:         [         ]    Total Risk Factor Score:    0 - 1:   Consider IPC  >2 - 3:  Thromboprophylaxis required (enoxaparin or SQ heparin)        >4:   High Risk: Thromboprophylaxis required (enoxaparin or SQ heparin), optional add IPC  **If CONTRAINDICATION to enoxaparin or SQ heparin, USE IPCs**    Consultant(s) Notes Reviewed: Endocrinology 3/8    MEDICATIONS  (STANDING):  atorvastatin 40 milliGRAM(s) Oral at bedtime  chlorhexidine 4% Liquid 1 Application(s) Topical <User Schedule>  dextrose 40% Gel 15 Gram(s) Oral once  dextrose 50% Injectable 25 Gram(s) IV Push once  dextrose 50% Injectable 25 Gram(s) IV Push once  dextrose 50% Injectable 12.5 Gram(s) IV Push once  enoxaparin Injectable 40 milliGRAM(s) SubCutaneous at bedtime  fenofibrate Tablet 145 milliGRAM(s) Oral daily  glucagon  Injectable 1 milliGRAM(s) IntraMuscular once  omega-3-Acid Ethyl Esters 2 Gram(s) Oral two times a day  PHENobarbital 64.8 milliGRAM(s) Oral every 6 hours    MEDICATIONS  (PRN):  morphine  - Injectable 4 milliGRAM(s) IV Push every 4 hours PRN Severe Pain (7 - 10)

## 2021-03-08 NOTE — CONSULT NOTE ADULT - ATTENDING COMMENTS
Joan Martínez (pager [phone])  On evenings and weekends, please call [phone] or page endocrine fellow on call.   Please note that this patient may be followed by different provider tomorrow. If no answer, contact endocrine fellow on call.

## 2021-03-08 NOTE — PROGRESS NOTE ADULT - ASSESSMENT
52 y/o male with pmh of hyperlipidemia and previous admission of pancreatitis in the setting of hypertriglyceridemia admitted for recurrent pancreatitis in the setting of hypertriglyceridemia, requiring an insulin drip, s/p ICU and now improved, remains on phenobarb taper, dispo planning.

## 2021-03-08 NOTE — CONSULT NOTE ADULT - PROBLEM SELECTOR RECOMMENDATION 2
Resolving.  Patient advised to avoid fatty foods, simple sugars and ETOH use and adhere to medications to control TG level  Recommend Nutrition consult

## 2021-03-09 LAB
ALBUMIN SERPL ELPH-MCNC: 3.3 G/DL — SIGNIFICANT CHANGE UP (ref 3.3–5)
ALP SERPL-CCNC: 83 U/L — SIGNIFICANT CHANGE UP (ref 40–120)
ALT FLD-CCNC: 73 U/L — HIGH (ref 4–41)
ANION GAP SERPL CALC-SCNC: 7 MMOL/L — SIGNIFICANT CHANGE UP (ref 7–14)
AST SERPL-CCNC: 59 U/L — HIGH (ref 4–40)
BILIRUB SERPL-MCNC: 0.8 MG/DL — SIGNIFICANT CHANGE UP (ref 0.2–1.2)
BUN SERPL-MCNC: 8 MG/DL — SIGNIFICANT CHANGE UP (ref 7–23)
CALCIUM SERPL-MCNC: 9.1 MG/DL — SIGNIFICANT CHANGE UP (ref 8.4–10.5)
CHLORIDE SERPL-SCNC: 100 MMOL/L — SIGNIFICANT CHANGE UP (ref 98–107)
CO2 SERPL-SCNC: 27 MMOL/L — SIGNIFICANT CHANGE UP (ref 22–31)
CREAT SERPL-MCNC: 0.74 MG/DL — SIGNIFICANT CHANGE UP (ref 0.5–1.3)
GLUCOSE BLDC GLUCOMTR-MCNC: 103 MG/DL — HIGH (ref 70–99)
GLUCOSE BLDC GLUCOMTR-MCNC: 105 MG/DL — HIGH (ref 70–99)
GLUCOSE BLDC GLUCOMTR-MCNC: 105 MG/DL — HIGH (ref 70–99)
GLUCOSE BLDC GLUCOMTR-MCNC: 134 MG/DL — HIGH (ref 70–99)
GLUCOSE BLDC GLUCOMTR-MCNC: 99 MG/DL — SIGNIFICANT CHANGE UP (ref 70–99)
GLUCOSE SERPL-MCNC: 109 MG/DL — HIGH (ref 70–99)
HCT VFR BLD CALC: 39.2 % — SIGNIFICANT CHANGE UP (ref 39–50)
HGB BLD-MCNC: 12.8 G/DL — LOW (ref 13–17)
MAGNESIUM SERPL-MCNC: 1.9 MG/DL — SIGNIFICANT CHANGE UP (ref 1.6–2.6)
MCHC RBC-ENTMCNC: 32.7 GM/DL — SIGNIFICANT CHANGE UP (ref 32–36)
MCHC RBC-ENTMCNC: 34.1 PG — HIGH (ref 27–34)
MCV RBC AUTO: 104.5 FL — HIGH (ref 80–100)
NRBC # BLD: 0 /100 WBCS — SIGNIFICANT CHANGE UP
NRBC # FLD: 0 K/UL — SIGNIFICANT CHANGE UP
PLATELET # BLD AUTO: 89 K/UL — LOW (ref 150–400)
POTASSIUM SERPL-MCNC: 3.9 MMOL/L — SIGNIFICANT CHANGE UP (ref 3.5–5.3)
POTASSIUM SERPL-SCNC: 3.9 MMOL/L — SIGNIFICANT CHANGE UP (ref 3.5–5.3)
PROT SERPL-MCNC: 6.2 G/DL — SIGNIFICANT CHANGE UP (ref 6–8.3)
RBC # BLD: 3.75 M/UL — LOW (ref 4.2–5.8)
RBC # FLD: 11.9 % — SIGNIFICANT CHANGE UP (ref 10.3–14.5)
SODIUM SERPL-SCNC: 134 MMOL/L — LOW (ref 135–145)
TRIGL SERPL-MCNC: 258 MG/DL — HIGH
WBC # BLD: 4.42 K/UL — SIGNIFICANT CHANGE UP (ref 3.8–10.5)
WBC # FLD AUTO: 4.42 K/UL — SIGNIFICANT CHANGE UP (ref 3.8–10.5)

## 2021-03-09 PROCEDURE — 99232 SBSQ HOSP IP/OBS MODERATE 35: CPT

## 2021-03-09 RX ORDER — PHENOBARBITAL 60 MG
64.8 TABLET ORAL EVERY 12 HOURS
Refills: 0 | Status: DISCONTINUED | OUTPATIENT
Start: 2021-03-09 | End: 2021-03-10

## 2021-03-09 RX ADMIN — CHLORHEXIDINE GLUCONATE 1 APPLICATION(S): 213 SOLUTION TOPICAL at 06:23

## 2021-03-09 RX ADMIN — Medication 64.8 MILLIGRAM(S): at 19:28

## 2021-03-09 RX ADMIN — Medication 64.8 MILLIGRAM(S): at 00:01

## 2021-03-09 RX ADMIN — Medication 2 GRAM(S): at 06:23

## 2021-03-09 RX ADMIN — Medication 145 MILLIGRAM(S): at 19:30

## 2021-03-09 RX ADMIN — ATORVASTATIN CALCIUM 40 MILLIGRAM(S): 80 TABLET, FILM COATED ORAL at 22:23

## 2021-03-09 RX ADMIN — ENOXAPARIN SODIUM 40 MILLIGRAM(S): 100 INJECTION SUBCUTANEOUS at 22:24

## 2021-03-09 RX ADMIN — Medication 64.8 MILLIGRAM(S): at 06:23

## 2021-03-09 RX ADMIN — Medication 2 GRAM(S): at 19:28

## 2021-03-09 NOTE — PROGRESS NOTE ADULT - ATTENDING COMMENTS
51 year old male with alcohol use disorder here with pancreatitis in setting of hypertriglyceridemia. Started on an insulin gtt. Also withdrawing from alcohol requiring phenobarbital. Continue insulin gtt. Triglycerides downtrending. Continue supportive care. Remainder of plan as above.
Joan Martínez (pager [phone])  On evenings and weekends, please call [phone] or page endocrine fellow on call.   Please note that this patient may be followed by different provider tomorrow. If no answer, contact endocrine fellow on call.

## 2021-03-09 NOTE — PROGRESS NOTE ADULT - PROBLEM SELECTOR PLAN 1
Resolved  will need to be discharged on fenofibrate, lovaza, atorvastatin, I have discussed the role of these medications with him today.

## 2021-03-09 NOTE — DIETITIAN INITIAL EVALUATION ADULT. - OTHER INFO
Unable to conduct a face to face interview or nutrition-focused physical exam due to limited contact restrictions related to Pt's medical condition and isolation precautions. Collateral obtained from chart review.   Unable to assess PO intake although it is reported in flow sheets that nutrition is good. No GI distress (nausea/vomiting/diarrhea/constipation) as per chart. No chewing or swallowing difficulties at this time as per chart. Unable to assess wt history.  Attempted to speak with Pt but was unable. Suggest Pt follow up at [facility].

## 2021-03-09 NOTE — PROGRESS NOTE ADULT - PROBLEM SELECTOR PLAN 1
Please recheck A1C  Continue to monitor TG q 12 hours  Continue fenofibrate 145mg daily, atorvastatin 40mg daily and Omega-3 2 g BID  Monitor Liver Function Tests carefully as inpatient and outpatient  Patient can follow up at Lipid center Located [address] [place] [address]   [phone]. Continue to monitor TG q 12 hours  Continue fenofibrate 145mg daily, atorvastatin 40mg daily and Omega-3 2 g BID  Monitor Liver Function Tests carefully as inpatient and outpatient  Patient can follow up at Lipid center Located [address] [place] [address]   [phone].

## 2021-03-09 NOTE — PROGRESS NOTE ADULT - SUBJECTIVE AND OBJECTIVE BOX
Chief Complaint:     History:    MEDICATIONS  (STANDING):  atorvastatin 40 milliGRAM(s) Oral at bedtime  chlorhexidine 4% Liquid 1 Application(s) Topical <User Schedule>  dextrose 40% Gel 15 Gram(s) Oral once  dextrose 50% Injectable 25 Gram(s) IV Push once  dextrose 50% Injectable 25 Gram(s) IV Push once  dextrose 50% Injectable 12.5 Gram(s) IV Push once  enoxaparin Injectable 40 milliGRAM(s) SubCutaneous at bedtime  fenofibrate Tablet 145 milliGRAM(s) Oral daily  glucagon  Injectable 1 milliGRAM(s) IntraMuscular once  omega-3-Acid Ethyl Esters 2 Gram(s) Oral two times a day  PHENobarbital 64.8 milliGRAM(s) Oral every 8 hours    MEDICATIONS  (PRN):      Allergies    No Known Allergies    Intolerances      Review of Systems:  Constitutional: No fever  Eyes: No blurry vision  Neuro: No tremors  HEENT: No pain  Cardiovascular: No chest pain, palpitations  Respiratory: No SOB, no cough  GI: No nausea, vomiting, abdominal pain  : No dysuria  Skin: no rash  Psych: no depression  Endocrine: no polyuria, polydipsia  Hem/lymph: no swelling  Osteoporosis: no fractures    ALL OTHER SYSTEMS REVIEWED AND NEGATIVE    UNABLE TO OBTAIN    PHYSICAL EXAM:  VITALS: T(C): 36.3 (03-09-21 @ 06:21)  T(F): 97.4 (03-09-21 @ 06:21), Max: 98.2 (03-08-21 @ 21:42)  HR: 65 (03-09-21 @ 06:21) (65 - 76)  BP: 148/99 (03-09-21 @ 06:21) (132/78 - 148/99)  RR:  (16 - 17)  SpO2:  (99% - 100%)  Wt(kg): --  GENERAL: NAD, well-groomed, well-developed  EYES: No proptosis, no lid lag, anicteric  HEENT:  Atraumatic, Normocephalic, moist mucous membranes  THYROID: Normal size, no palpable nodules  RESPIRATORY: Clear to auscultation bilaterally; No rales, rhonchi, wheezing, or rubs  CARDIOVASCULAR: Regular rate and rhythm; No murmurs; no peripheral edema  GI: Soft, nontender, non distended, normal bowel sounds  SKIN: Dry, intact, No rashes or lesions  MUSCULOSKELETAL: Full range of motion, normal strength  NEURO: sensation intact, extraocular movements intact, no tremor, normal reflexes  PSYCH: Alert and oriented x 3, normal affect, normal mood  CUSHING'S SIGNS: no striae    POCT Blood Glucose.: 99 mg/dL (03-09-21 @ 08:58)  POCT Blood Glucose.: 105 mg/dL (03-09-21 @ 06:27)  POCT Blood Glucose.: 101 mg/dL (03-08-21 @ 23:49)  POCT Blood Glucose.: 99 mg/dL (03-08-21 @ 17:51)  POCT Blood Glucose.: 114 mg/dL (03-08-21 @ 13:25)  POCT Blood Glucose.: 95 mg/dL (03-08-21 @ 09:08)  POCT Blood Glucose.: 122 mg/dL (03-08-21 @ 03:52)  POCT Blood Glucose.: 75 mg/dL (03-08-21 @ 03:02)  POCT Blood Glucose.: 77 mg/dL (03-07-21 @ 22:04)  POCT Blood Glucose.: 83 mg/dL (03-07-21 @ 20:34)  POCT Blood Glucose.: 95 mg/dL (03-07-21 @ 19:28)  POCT Blood Glucose.: 104 mg/dL (03-07-21 @ 18:08)  POCT Blood Glucose.: 114 mg/dL (03-07-21 @ 17:12)  POCT Blood Glucose.: 112 mg/dL (03-07-21 @ 15:57)  POCT Blood Glucose.: 111 mg/dL (03-07-21 @ 15:03)  POCT Blood Glucose.: 106 mg/dL (03-07-21 @ 13:37)  POCT Blood Glucose.: 98 mg/dL (03-07-21 @ 12:36)  POCT Blood Glucose.: 104 mg/dL (03-07-21 @ 11:02)  POCT Blood Glucose.: 113 mg/dL (03-07-21 @ 10:20)  POCT Blood Glucose.: 106 mg/dL (03-07-21 @ 09:19)  POCT Blood Glucose.: 91 mg/dL (03-07-21 @ 08:02)  POCT Blood Glucose.: 136 mg/dL (03-07-21 @ 06:20)  POCT Blood Glucose.: 56 mg/dL (03-07-21 @ 05:32)  POCT Blood Glucose.: 89 mg/dL (03-07-21 @ 04:18)  POCT Blood Glucose.: 80 mg/dL (03-07-21 @ 03:07)  POCT Blood Glucose.: 222 mg/dL (03-07-21 @ 02:22)  POCT Blood Glucose.: 66 mg/dL (03-07-21 @ 01:59)  POCT Blood Glucose.: 86 mg/dL (03-07-21 @ 00:59)  POCT Blood Glucose.: 91 mg/dL (03-06-21 @ 23:42)      03-09    134<L>  |  100  |  8   ----------------------------<  109<H>  3.9   |  27  |  0.74    EGFR if : 123  EGFR if non : 106    Ca    9.1      03-09  Mg     1.9     03-09  Phos  2.6     03-08    TPro  6.2  /  Alb  3.3  /  TBili  0.8  /  DBili  x   /  AST  59<H>  /  ALT  73<H>  /  AlkPhos  83  03-09          Thyroid Function Tests:                           Chief Complaint: HTG    History: Patient is tolerating HLD medications. TGs and LFTs trending down.     MEDICATIONS  (STANDING):  atorvastatin 40 milliGRAM(s) Oral at bedtime  chlorhexidine 4% Liquid 1 Application(s) Topical <User Schedule>  dextrose 40% Gel 15 Gram(s) Oral once  dextrose 50% Injectable 25 Gram(s) IV Push once  dextrose 50% Injectable 25 Gram(s) IV Push once  dextrose 50% Injectable 12.5 Gram(s) IV Push once  enoxaparin Injectable 40 milliGRAM(s) SubCutaneous at bedtime  fenofibrate Tablet 145 milliGRAM(s) Oral daily  glucagon  Injectable 1 milliGRAM(s) IntraMuscular once  omega-3-Acid Ethyl Esters 2 Gram(s) Oral two times a day  PHENobarbital 64.8 milliGRAM(s) Oral every 8 hours    MEDICATIONS  (PRN):      Allergies    No Known Allergies    Intolerances      Review of Systems:  Constitutional: No fever  Eyes: No blurry vision  Neuro: No tremors  HEENT: No pain  Cardiovascular: No chest pain, palpitations  Respiratory: No SOB, no cough  GI: No nausea, vomiting, abdominal pain  : No dysuria  Skin: no rash  Psych: no depression  Endocrine: no polyuria, polydipsia  Hem/lymph: no swelling  Osteoporosis: no fractures    ALL OTHER SYSTEMS REVIEWED AND NEGATIVE        PHYSICAL EXAM:  VITALS: T(C): 36.3 (03-09-21 @ 06:21)  T(F): 97.4 (03-09-21 @ 06:21), Max: 98.2 (03-08-21 @ 21:42)  HR: 65 (03-09-21 @ 06:21) (65 - 76)  BP: 148/99 (03-09-21 @ 06:21) (132/78 - 148/99)  RR:  (16 - 17)  SpO2:  (99% - 100%)  Wt(kg): --  GENERAL: NAD, well-groomed, well-developed  EYES: No proptosis, no lid lag, anicteric  HEENT:  Atraumatic, Normocephalic, moist mucous membranes  RESPIRATORY: Clear to auscultation bilaterally; No rales, rhonchi, wheezing, or rubs  CARDIOVASCULAR: Regular rate and rhythm  GI: Soft, nontender, non distended, normal bowel sounds        POCT Blood Glucose.: 99 mg/dL (03-09-21 @ 08:58)  POCT Blood Glucose.: 105 mg/dL (03-09-21 @ 06:27)  POCT Blood Glucose.: 101 mg/dL (03-08-21 @ 23:49)  POCT Blood Glucose.: 99 mg/dL (03-08-21 @ 17:51)  POCT Blood Glucose.: 114 mg/dL (03-08-21 @ 13:25)  POCT Blood Glucose.: 95 mg/dL (03-08-21 @ 09:08)  POCT Blood Glucose.: 122 mg/dL (03-08-21 @ 03:52)  POCT Blood Glucose.: 75 mg/dL (03-08-21 @ 03:02)  POCT Blood Glucose.: 77 mg/dL (03-07-21 @ 22:04)  POCT Blood Glucose.: 83 mg/dL (03-07-21 @ 20:34)  POCT Blood Glucose.: 95 mg/dL (03-07-21 @ 19:28)  POCT Blood Glucose.: 104 mg/dL (03-07-21 @ 18:08)  POCT Blood Glucose.: 114 mg/dL (03-07-21 @ 17:12)  POCT Blood Glucose.: 112 mg/dL (03-07-21 @ 15:57)  POCT Blood Glucose.: 111 mg/dL (03-07-21 @ 15:03)  POCT Blood Glucose.: 106 mg/dL (03-07-21 @ 13:37)  POCT Blood Glucose.: 98 mg/dL (03-07-21 @ 12:36)  POCT Blood Glucose.: 104 mg/dL (03-07-21 @ 11:02)  POCT Blood Glucose.: 113 mg/dL (03-07-21 @ 10:20)  POCT Blood Glucose.: 106 mg/dL (03-07-21 @ 09:19)  POCT Blood Glucose.: 91 mg/dL (03-07-21 @ 08:02)  POCT Blood Glucose.: 136 mg/dL (03-07-21 @ 06:20)  POCT Blood Glucose.: 56 mg/dL (03-07-21 @ 05:32)  POCT Blood Glucose.: 89 mg/dL (03-07-21 @ 04:18)  POCT Blood Glucose.: 80 mg/dL (03-07-21 @ 03:07)  POCT Blood Glucose.: 222 mg/dL (03-07-21 @ 02:22)  POCT Blood Glucose.: 66 mg/dL (03-07-21 @ 01:59)  POCT Blood Glucose.: 86 mg/dL (03-07-21 @ 00:59)  POCT Blood Glucose.: 91 mg/dL (03-06-21 @ 23:42)      03-09    134<L>  |  100  |  8   ----------------------------<  109<H>  3.9   |  27  |  0.74    EGFR if : 123  EGFR if non : 106    Ca    9.1      03-09  Mg     1.9     03-09  Phos  2.6     03-08    TPro  6.2  /  Alb  3.3  /  TBili  0.8  /  DBili  x   /  AST  59<H>  /  ALT  73<H>  /  AlkPhos  83  03-09

## 2021-03-09 NOTE — PROGRESS NOTE ADULT - SUBJECTIVE AND OBJECTIVE BOX
Kee Pinon M.D.  Hospitalist 8am-7pm  Division of Hospital Medicine  [facility]  cell [phone]  pager [phone]    Patient is a 51y old  Male who presents with a chief complaint of Pancreatitis (09 Mar 2021 09:58)    SUBJECTIVE / OVERNIGHT EVENTS: Patient seen and examined. No acute overnight events. Tolerated food all day yesterday and today,, no abdominal pain or nausea. Has not had bowel movement last 2 days, however increasing water intake and moving around more. Looking to go to [employer] upon discharge to avoid exposing family.     OBJECTIVE:  Vital Signs Last 24 Hrs  T(C): 36.3 (09 Mar 2021 06:21), Max: 36.8 (08 Mar 2021 21:42)  T(F): 97.4 (09 Mar 2021 06:21), Max: 98.2 (08 Mar 2021 21:42)  HR: 65 (09 Mar 2021 06:21) (65 - 76)  BP: 148/99 (09 Mar 2021 06:21) (132/78 - 148/99)  BP(mean): --  RR: 17 (09 Mar 2021 06:21) (16 - 17)  SpO2: 100% (09 Mar 2021 06:21) (99% - 100%)    I&O's Summary    08 Mar 2021 07:01  -  09 Mar 2021 07:00  --------------------------------------------------------  IN: 420 mL / OUT: 900 mL / NET: -480 mL      Physical Examination:  GEN: Well appearing, in NAD  EYES: PERRL, EOMI, no scleral icterus  HEAD/NECK: Normocephalic, atraumatic  RESP: no accessory muscle use, B/L air entry, CTAB   CARDIO: regular rate/rhythm  ABD: soft, NT, ND, +BS  PSYCH: A&Ox3, normal affect  SKIN: warm, dry, in tact, no rashes  NEURO: no focal neurologic deficits appreciated  EXT: no lower extremity edema, no cyanosis  VASC: good peripheral pulses      (03-09 @ 07:19)                      12.8  4.42 )-----------( 89                 39.2    03-09    134<L>  |  100  |  8   ----------------------------<  109<H>  3.9   |  27  |  0.74    Ca    9.1      09 Mar 2021 07:19  Phos  2.6     03-08  Mg     1.9     03-09    TPro  6.2  /  Alb  3.3  /  TBili  0.8  /  DBili  x   /  AST  59<H>  /  ALT  73<H>  /  AlkPhos  83  03-09    lTriglycerides, Serum: 258 mg/dL (03.09.21 @ 07:19)   Triglycerides, Serum: 249 mg/dL (03.08.21 @ 21:30)   Triglycerides, Serum: 417 mg/dL (03.08.21 @ 03:28)   Triglycerides, Serum: 383 mg/dL (03.07.21 @ 22:22)   Triglycerides, Serum: 588 mg/dL (03.07.21 @ 17:43)   Triglycerides, Serum: 1017 mg/dL (03.07.21 @ 12:57)       RVP:(03-06 @ 21:47)  Detected    SARS-CoV-2: Detected (06 Mar 2021 21:47)          I&O's Summary    08 Mar 2021 07:01  -  09 Mar 2021 07:00  --------------------------------------------------------  IN: 420 mL / OUT: 900 mL / NET: -480 mL      IMPROVE VTE Individual Risk Assessment          RISK                                                          Points  [  ] Previous VTE                                                3  [  ] Thrombophilia                                             2  [  ] Lower limb paralysis                                   2        (unable to hold up >15 seconds)    [  ] Current Cancer                                             2         (within 6 months)  [  ] Immobilization > 24 hrs                              1  [  ] ICU/CCU stay > 24 hours                             1  [  ] Age > 60                                                         1    IMPROVE VTE Score:         [         ]    Total Risk Factor Score:    0 - 1:   Consider IPC  >2 - 3:  Thromboprophylaxis required (enoxaparin or SQ heparin)        >4:   High Risk: Thromboprophylaxis required (enoxaparin or SQ heparin), optional add IPC  **If CONTRAINDICATION to enoxaparin or SQ heparin, USE IPCs**    Consultant(s) Notes Reviewed: Endocrinology 3/9    MEDICATIONS  (STANDING):  atorvastatin 40 milliGRAM(s) Oral at bedtime  chlorhexidine 4% Liquid 1 Application(s) Topical <User Schedule>  dextrose 40% Gel 15 Gram(s) Oral once  dextrose 50% Injectable 25 Gram(s) IV Push once  dextrose 50% Injectable 25 Gram(s) IV Push once  dextrose 50% Injectable 12.5 Gram(s) IV Push once  enoxaparin Injectable 40 milliGRAM(s) SubCutaneous at bedtime  fenofibrate Tablet 145 milliGRAM(s) Oral daily  glucagon  Injectable 1 milliGRAM(s) IntraMuscular once  omega-3-Acid Ethyl Esters 2 Gram(s) Oral two times a day  PHENobarbital 64.8 milliGRAM(s) Oral every 8 hours

## 2021-03-10 ENCOUNTER — TRANSCRIPTION ENCOUNTER (OUTPATIENT)
Age: 51
End: 2021-03-10

## 2021-03-10 LAB
ALBUMIN SERPL ELPH-MCNC: 3.5 G/DL — SIGNIFICANT CHANGE UP (ref 3.3–5)
ALP SERPL-CCNC: 76 U/L — SIGNIFICANT CHANGE UP (ref 40–120)
ALT FLD-CCNC: 58 U/L — HIGH (ref 4–41)
ANION GAP SERPL CALC-SCNC: 10 MMOL/L — SIGNIFICANT CHANGE UP (ref 7–14)
AST SERPL-CCNC: 41 U/L — HIGH (ref 4–40)
BILIRUB SERPL-MCNC: 0.6 MG/DL — SIGNIFICANT CHANGE UP (ref 0.2–1.2)
BUN SERPL-MCNC: 11 MG/DL — SIGNIFICANT CHANGE UP (ref 7–23)
CALCIUM SERPL-MCNC: 9.6 MG/DL — SIGNIFICANT CHANGE UP (ref 8.4–10.5)
CHLORIDE SERPL-SCNC: 99 MMOL/L — SIGNIFICANT CHANGE UP (ref 98–107)
CO2 SERPL-SCNC: 25 MMOL/L — SIGNIFICANT CHANGE UP (ref 22–31)
CREAT SERPL-MCNC: 0.75 MG/DL — SIGNIFICANT CHANGE UP (ref 0.5–1.3)
GLUCOSE BLDC GLUCOMTR-MCNC: 102 MG/DL — HIGH (ref 70–99)
GLUCOSE BLDC GLUCOMTR-MCNC: 109 MG/DL — HIGH (ref 70–99)
GLUCOSE BLDC GLUCOMTR-MCNC: 89 MG/DL — SIGNIFICANT CHANGE UP (ref 70–99)
GLUCOSE BLDC GLUCOMTR-MCNC: 90 MG/DL — SIGNIFICANT CHANGE UP (ref 70–99)
GLUCOSE SERPL-MCNC: 100 MG/DL — HIGH (ref 70–99)
HCT VFR BLD CALC: 41.3 % — SIGNIFICANT CHANGE UP (ref 39–50)
HGB BLD-MCNC: 13 G/DL — SIGNIFICANT CHANGE UP (ref 13–17)
MAGNESIUM SERPL-MCNC: 1.8 MG/DL — SIGNIFICANT CHANGE UP (ref 1.6–2.6)
MCHC RBC-ENTMCNC: 31.5 GM/DL — LOW (ref 32–36)
MCHC RBC-ENTMCNC: 33.9 PG — SIGNIFICANT CHANGE UP (ref 27–34)
MCV RBC AUTO: 107.6 FL — HIGH (ref 80–100)
NRBC # BLD: 0 /100 WBCS — SIGNIFICANT CHANGE UP
NRBC # FLD: 0 K/UL — SIGNIFICANT CHANGE UP
PLATELET # BLD AUTO: 105 K/UL — LOW (ref 150–400)
POTASSIUM SERPL-MCNC: 4.1 MMOL/L — SIGNIFICANT CHANGE UP (ref 3.5–5.3)
POTASSIUM SERPL-SCNC: 4.1 MMOL/L — SIGNIFICANT CHANGE UP (ref 3.5–5.3)
PROT SERPL-MCNC: 6.4 G/DL — SIGNIFICANT CHANGE UP (ref 6–8.3)
RBC # BLD: 3.84 M/UL — LOW (ref 4.2–5.8)
RBC # FLD: 11.8 % — SIGNIFICANT CHANGE UP (ref 10.3–14.5)
SODIUM SERPL-SCNC: 134 MMOL/L — LOW (ref 135–145)
TRIGL SERPL-MCNC: 184 MG/DL — HIGH
WBC # BLD: 5.7 K/UL — SIGNIFICANT CHANGE UP (ref 3.8–10.5)
WBC # FLD AUTO: 5.7 K/UL — SIGNIFICANT CHANGE UP (ref 3.8–10.5)

## 2021-03-10 PROCEDURE — 99232 SBSQ HOSP IP/OBS MODERATE 35: CPT

## 2021-03-10 PROCEDURE — 99239 HOSP IP/OBS DSCHRG MGMT >30: CPT

## 2021-03-10 RX ORDER — ATORVASTATIN CALCIUM 80 MG/1
1 TABLET, FILM COATED ORAL
Qty: 30 | Refills: 0
Start: 2021-03-10 | End: 2021-04-08

## 2021-03-10 RX ORDER — FENOFIBRATE,MICRONIZED 130 MG
1 CAPSULE ORAL
Qty: 30 | Refills: 0
Start: 2021-03-10 | End: 2021-04-08

## 2021-03-10 RX ORDER — ICOSAPENT ETHYL 500 MG/1
2 CAPSULE, LIQUID FILLED ORAL
Qty: 120 | Refills: 0
Start: 2021-03-10 | End: 2021-04-08

## 2021-03-10 RX ORDER — OMEGA-3 ACID ETHYL ESTERS 1 G
2 CAPSULE ORAL
Qty: 120 | Refills: 0
Start: 2021-03-10 | End: 2021-04-08

## 2021-03-10 RX ADMIN — ATORVASTATIN CALCIUM 40 MILLIGRAM(S): 80 TABLET, FILM COATED ORAL at 22:23

## 2021-03-10 RX ADMIN — Medication 145 MILLIGRAM(S): at 12:28

## 2021-03-10 RX ADMIN — Medication 64.8 MILLIGRAM(S): at 06:16

## 2021-03-10 RX ADMIN — Medication 2 GRAM(S): at 06:16

## 2021-03-10 RX ADMIN — CHLORHEXIDINE GLUCONATE 1 APPLICATION(S): 213 SOLUTION TOPICAL at 06:16

## 2021-03-10 RX ADMIN — Medication 2 GRAM(S): at 17:24

## 2021-03-10 RX ADMIN — ENOXAPARIN SODIUM 40 MILLIGRAM(S): 100 INJECTION SUBCUTANEOUS at 22:23

## 2021-03-10 NOTE — PROGRESS NOTE ADULT - SUBJECTIVE AND OBJECTIVE BOX
Kee Pinon M.D.  Hospitalist 8am-7pm  Division of Hospital Medicine  [facility]  cell [phone]  pager [phone]    Patient is a 51y old  Male who presents with a chief complaint of Pancreatitis (10 Mar 2021 12:11)    SUBJECTIVE / OVERNIGHT EVENTS: Patient seen and examined. No CP/SOB/fevers/COVID symptoms. Continues to tolerate diet, normal bowel movements and no abdominal pain. Had brief episode of anxiety last night, felt better after staff brought him snacks. No shakiness, weakness, nausea or withdrawal symptoms. Last dose phenobarb today.     OBJECTIVE:  Vital Signs Last 24 Hrs  T(C): 36.7 (10 Mar 2021 12:25), Max: 36.8 (09 Mar 2021 22:17)  T(F): 98.1 (10 Mar 2021 12:25), Max: 98.2 (09 Mar 2021 22:17)  HR: 62 (10 Mar 2021 12:25) (62 - 80)  BP: 136/84 (10 Mar 2021 12:25) (136/84 - 143/81)  RR: 16 (10 Mar 2021 12:25) (16 - 18)  SpO2: 100% (10 Mar 2021 12:25) (98% - 100%)    I&O's Summary    Physical Examination:  GEN: Well appearing, in NAD  EYES: PERRL, EOMI, no scleral icterus  HEAD/NECK: Normocephalic, atraumatic  RESP: no accessory muscle use, B/L air entry, CTAB   CARDIO: regular rate/rhythm  ABD: soft, NT, ND, +BS  PSYCH: A&Ox3, normal affect  SKIN: warm, dry, in tact, no rashes  NEURO: no focal neurologic deficits appreciated  EXT: no lower extremity edema, no cyanosis  VASC: good peripheral pulses      (03-10 @ 07:45)                      13.0  5.70 )-----------( 105                 41.3      03-10    134<L>  |  99  |  11  ----------------------------<  100<H>  4.1   |  25  |  0.75    Ca    9.6      10 Mar 2021 07:41  Mg     1.8     03-10    TPro  6.4  /  Alb  3.5  /  TBili  0.6  /  DBili  x   /  AST  41<H>  /  ALT  58<H>  /  AlkPhos  76  03-10      RVP:(03-06 @ 21:47)  Detected    IMPROVE VTE Individual Risk Assessment          RISK                                                          Points  [  ] Previous VTE                                                3  [  ] Thrombophilia                                             2  [  ] Lower limb paralysis                                   2        (unable to hold up >15 seconds)    [  ] Current Cancer                                             2         (within 6 months)  [  ] Immobilization > 24 hrs                              1  [  ] ICU/CCU stay > 24 hours                             1  [  ] Age > 60                                                         1    IMPROVE VTE Score:         [         ]    Total Risk Factor Score:    0 - 1:   Consider IPC  >2 - 3:  Thromboprophylaxis required (enoxaparin or SQ heparin)        >4:   High Risk: Thromboprophylaxis required (enoxaparin or SQ heparin), optional add IPC  **If CONTRAINDICATION to enoxaparin or SQ heparin, USE IPCs**    Consultant(s) Notes Reviewed: Endocrinology 3/9    MEDICATIONS  (STANDING):  atorvastatin 40 milliGRAM(s) Oral at bedtime  chlorhexidine 4% Liquid 1 Application(s) Topical <User Schedule>  dextrose 40% Gel 15 Gram(s) Oral once  dextrose 50% Injectable 25 Gram(s) IV Push once  dextrose 50% Injectable 25 Gram(s) IV Push once  dextrose 50% Injectable 12.5 Gram(s) IV Push once  enoxaparin Injectable 40 milliGRAM(s) SubCutaneous at bedtime  fenofibrate Tablet 145 milliGRAM(s) Oral daily  glucagon  Injectable 1 milliGRAM(s) IntraMuscular once  omega-3-Acid Ethyl Esters 2 Gram(s) Oral two times a day    MEDICATIONS  (PRN):   Kee Pinon M.D.  Hospitalist 8am-7pm  Division of Hospital Medicine  [facility]  cell [phone]  pager [phone]    Patient is a 51y old  Male who presents with a chief complaint of Pancreatitis (10 Mar 2021 12:11)    SUBJECTIVE / OVERNIGHT EVENTS: Patient seen and examined. No CP/SOB/fevers/COVID symptoms. Continues to tolerate diet, normal bowel movements and no abdominal pain. Had brief episode of anxiety last night, felt better after staff brought him snacks. No shakiness, weakness, nausea or withdrawal symptoms. Last dose phenobarb today.     OBJECTIVE:  Vital Signs Last 24 Hrs  T(C): 36.7 (10 Mar 2021 12:25), Max: 36.8 (09 Mar 2021 22:17)  T(F): 98.1 (10 Mar 2021 12:25), Max: 98.2 (09 Mar 2021 22:17)  HR: 62 (10 Mar 2021 12:25) (62 - 80)  BP: 136/84 (10 Mar 2021 12:25) (136/84 - 143/81)  RR: 16 (10 Mar 2021 12:25) (16 - 18)  SpO2: 100% (10 Mar 2021 12:25) (98% - 100%)    I&O's Summary    Physical Examination:  GEN: Well appearing, in NAD  EYES: PERRL, EOMI, no scleral icterus  HEAD/NECK: Normocephalic, atraumatic  RESP: no accessory muscle use, B/L air entry, CTAB   CARDIO: regular rate/rhythm  ABD: soft, NT, ND, +BS  PSYCH: A&Ox3, normal affect  SKIN: warm, dry, in tact, no rashes  NEURO: no focal neurologic deficits appreciated  EXT: no lower extremity edema, no cyanosis  VASC: good peripheral pulses      (03-10 @ 07:45)                      13.0  5.70 )-----------( 105                 41.3      03-10    134<L>  |  99  |  11  ----------------------------<  100<H>  4.1   |  25  |  0.75    Ca    9.6      10 Mar 2021 07:41  Mg     1.8     03-10    TPro  6.4  /  Alb  3.5  /  TBili  0.6  /  DBili  x   /  AST  41<H>  /  ALT  58<H>  /  AlkPhos  76  03-10      RVP:(03-06 @ 21:47)  Detected    IMPROVE VTE Individual Risk Assessment          RISK                                                          Points  [  ] Previous VTE                                                3  [  ] Thrombophilia                                             2  [  ] Lower limb paralysis                                   2        (unable to hold up >15 seconds)    [  ] Current Cancer                                             2         (within 6 months)  [  ] Immobilization > 24 hrs                              1  [  ] ICU/CCU stay > 24 hours                             1  [  ] Age > 60                                                         1    IMPROVE VTE Score:         [         ]    Total Risk Factor Score:    0 - 1:   Consider IPC  >2 - 3:  Thromboprophylaxis required (enoxaparin or SQ heparin)        >4:   High Risk: Thromboprophylaxis required (enoxaparin or SQ heparin), optional add IPC  **If CONTRAINDICATION to enoxaparin or SQ heparin, USE IPCs**    Consultant(s) Notes Reviewed: Endocrinology 3/9    MEDICATIONS  (STANDING):  atorvastatin 40 milliGRAM(s) Oral at bedtime  chlorhexidine 4% Liquid 1 Application(s) Topical <User Schedule>  dextrose 40% Gel 15 Gram(s) Oral once  dextrose 50% Injectable 25 Gram(s) IV Push once  dextrose 50% Injectable 25 Gram(s) IV Push once  dextrose 50% Injectable 12.5 Gram(s) IV Push once  enoxaparin Injectable 40 milliGRAM(s) SubCutaneous at bedtime  fenofibrate Tablet 145 milliGRAM(s) Oral daily  glucagon  Injectable 1 milliGRAM(s) IntraMuscular once  omega-3-Acid Ethyl Esters 2 Gram(s) Oral two times a day    MEDICATIONS  (PRN):

## 2021-03-10 NOTE — CHART NOTE - NSCHARTNOTEFT_GEN_A_CORE
Labs reviewed. TG trending down. LFTs trending down. Pt is tolerating statin, fibrate and omega-3. He can f/u with Lipid center as outpt. See info in previous notes. Endocrine team will sign off.

## 2021-03-10 NOTE — DISCHARGE NOTE PROVIDER - HOSPITAL COURSE
52 y/o male with pmh of hyperlipidemia and previous admission of pancreatitis in the setting of hypertriglyceridemia admitted for recurrent pancreatitis in the setting of hypertriglyceridemia, requiring an insulin drip, s/p ICU and now improved, remains on phenobarb taper, dispo planning.     Pancreatitis.  Plan: Resolved, will need to be discharged on fenofibrate, lovaza, atorvastatin, attending discussed the importance of these medications and the importance of compliance. Pt also had hypertriglyceridemia which ochoa continued to downtrend. Pt should continue to follow up with Endocrinology as outpatient     Alcohol withdrawal syndrome with complication: pt was placed on a phenobarb taper and is doing well. No signs of withdrawal. No anxiety, no tremors, pt feeling well walking around well. Liver enzymes were elevated briefly however have returned to normal        Pt now on room air and sating well. He currently is asymptomatic and wishes to go to hotel. Reviewed case with attending as he is now stable for discharge. Pt was explained the importance of taking medications and picking up prescriptions prior to discharge. Pt was given opportunity to ask all questions and they were answered 52 y/o male, with a PmHx of hyperlipidemia and previous admission of pancreatitis in the setting of hypertriglyceridemia, who presented to the ED for abdominal pain with nausea and episodes of emesis. The pt has been having abdominal discomfort described as a tight feeling with back pain for the past few days, however endorses that today it was a 10/10. Also noted to having 2 episodes of NBNB emesis. He also endorses alcohol use, 1 pint of gin 3x/week, with his last drink of 1 pint of gin approximately 2 days ago. While in the ED, the pt was found to have an elevated lipase of 227 and hypertriglyceridemia of 3127 with a CT of the abdomen and pelvis demonstrating acute interstitial edematous pancreatitis. The pt received 1L NS Bolus and 1L LR Bolus and morphine 4 mg IVP x2. He was recently admitted to the MICU on 9/2020 for pancreatitis secondary to hypertriglyceridemia with labs demonstrating triglycerides of 3801, lipase 774.8, and hyponatremia 122. He received an insulin gtt and IVF with an improvement of his triglycerides. Pt admitted to MICU for insulin gtt in the setting of hypertriglyceridemia and further monitoring.    On admission:    1. COVID Positive  WBC: 12.55          RVP neg              COVID positive  -Asymptomatic on RA  -Will hold off on monoclonal Ab infusion given pt is 52 y/o, BMI of 24, only w/ pmh of HLD and is not immunosuppressed    2. Acute Pancreatitis  WBC: 12.55            AST/ALT: 382/180             Lipase: 227          Lactate: 2.6  Tot Bili: 1.8  -Likely in the setting of hypertriglyceridemia and alcohol use  3/6 CT Abd/Pelvis w/ IV contrast - fat stranding, inflammatory changes surrounding the pancreatic head, and infiltration of the mesenteric fat, possibly suggestive of acute interstitial edematous pancreatitis. 5mm nodule right middle lobe lung (2:6), unchanged from 6/15/2019. Hepatic steatosis.  - For symptomatic pain, morphine 4 mg q4hrs  - Diet advanced, off insulin gtt  - Monitor and replete BMPq6, K, mag, phos, and calcium  - Started on Fenofibrate 145 mg QD, Lovaza 2 g BID, and atorvastatin 40 mg QD  - Monitor for signs of alcohol withdrawal, on phenobarbital 130 mg IVP q4hrs    3. EtOH/Agitation  - s/p precedex gtt, phenobarb 130 q6h until 3/10    4. Hypertriglyceridemia  - Elevated triglycerides: 3127  - s/p on Insulin gtt, now mild hypoglycemia  - House Endocrinology c/s following    Pt comfortable at this time and is now medically cleared for discharge home. He will be going to a [employer] hotel. His pancreatitis has resolved and will need to be discharged on fenofibrate, lovaza, atorvastatin, attending discussed the importance of these medications and the importance of compliance. Pt should continue to follow up with Endocrinology as outpatient. Alcohol withdrawal syndrome with complication. Was on a phenobarb taper that has been completed and is now doing well. No signs of withdrawal. No anxiety, no tremors. Outpatient follow up.    Reviewed discharge medications with patient; All new medications requiring new prescription sent to pharmacy of patients choice. Reviewed need for prescription from previous home medications and new prescriptions sent if requested. Patient in agreement and understands. 50 y/o male, with a PmHx of hyperlipidemia and previous admission of pancreatitis in the setting of hypertriglyceridemia, who presented to the ED for abdominal pain with nausea and episodes of emesis. The pt has been having abdominal discomfort described as a tight feeling with back pain for the past few days, however endorses that today it was a 10/10. Also noted to having 2 episodes of NBNB emesis. He also endorses alcohol use, 1 pint of gin 3x/week, with his last drink of 1 pint of gin approximately 2 days ago. While in the ED, the pt was found to have an elevated lipase of 227 and hypertriglyceridemia of 3127 with a CT of the abdomen and pelvis demonstrating acute interstitial edematous pancreatitis. The pt received 1L NS Bolus and 1L LR Bolus and morphine 4 mg IVP x2. He was recently admitted to the MICU on 9/2020 for pancreatitis secondary to hypertriglyceridemia with labs demonstrating triglycerides of 3801, lipase 774.8, and hyponatremia 122. He received an insulin gtt and IVF with an improvement of his triglycerides. Pt admitted to MICU for insulin gtt in the setting of hypertriglyceridemia and further monitoring.    On admission:    1. COVID Positive  WBC: 12.55          RVP neg              COVID positive  -Asymptomatic on RA  -Will hold off on monoclonal Ab infusion given pt is 50 y/o, BMI of 24, only w/ pmh of HLD and is not immunosuppressed    2. Acute Pancreatitis  WBC: 12.55            AST/ALT: 382/180             Lipase: 227          Lactate: 2.6  Tot Bili: 1.8  -Likely in the setting of hypertriglyceridemia and alcohol use  3/6 CT Abd/Pelvis w/ IV contrast - fat stranding, inflammatory changes surrounding the pancreatic head, and infiltration of the mesenteric fat, possibly suggestive of acute interstitial edematous pancreatitis. 5mm nodule right middle lobe lung (2:6), unchanged from 6/15/2019. Hepatic steatosis.  - For symptomatic pain, morphine 4 mg q4hrs  - Diet advanced, off insulin gtt  - Monitor and replete BMPq6, K, mag, phos, and calcium  - Started on Fenofibrate 145 mg QD, Lovaza 2 g BID, and atorvastatin 40 mg QD  - Monitor for signs of alcohol withdrawal, on phenobarbital 130 mg IVP q4hrs    3. EtOH/Agitation  - s/p precedex gtt, phenobarb 130 q6h until 3/10    4. Hypertriglyceridemia  - Elevated triglycerides: 3127  - s/p on Insulin gtt, now mild hypoglycemia  - House Endocrinology c/s following    Pt comfortable at this time and is now medically cleared for discharge home. He will be going to a [employer] hotel at his request. His pancreatitis has resolved and will need to be discharged on fenofibrate, lovaza, atorvastatin, attending discussed the importance of these medications and the importance of compliance. Pt should continue to follow up with Endocrinology as outpatient. Alcohol withdrawal syndrome with complication. Was on a phenobarb taper that has been completed and is now doing well. No signs of withdrawal. No anxiety, no tremors. Outpatient follow up.    Reviewed discharge medications with patient; All new medications requiring new prescription sent to pharmacy of patients choice. Reviewed need for prescription from previous home medications and new prescriptions sent if requested. Patient in agreement and understands.

## 2021-03-10 NOTE — PROGRESS NOTE ADULT - PROBLEM SELECTOR PLAN 1
Resolved  I have discussed ETOH use with patient and risks for further pancreatitis flares or other complications.   d/c on atorvastatin, fenofibrate, lovaza, insurance has denied- ok with icosapent ethyl instead on d/c.

## 2021-03-10 NOTE — DISCHARGE NOTE PROVIDER - NSDCCPCAREPLAN_GEN_ALL_CORE_FT
PRINCIPAL DISCHARGE DIAGNOSIS  Diagnosis: Pancreatitis  Assessment and Plan of Treatment: You were diagnosed with pancreatitis. It is important to continue to take Fenofibrate, lovaza, and atorvastatin along with your other medications every day. You should also consider to stop drinking as this is a risk factor for pancreatitis. Be sure to follow up with your doctor. YOUR NURSE WILL  THESE MEDS FROM [facility] FOR YOU PRIOR TO YOUR LEAVING      SECONDARY DISCHARGE DIAGNOSES  Diagnosis: COVID-19  Assessment and Plan of Treatment: You have been diagnosed with the COVID-19 virus during your hospital stay. You must self quarantine to complete a 14 day time period.  Monitor for fevers, shortness of breath and cough primarily.  Monitor your temperature daily to notice any changes and increases.    It has been determined that you no longer need hospitalization and can recover while remaining in self-quarantine at home. You should follow the prevention steps below until a healthcare provider or local or state health department says you can return to your normal activities.  1. You should restrict activities outside your home, except for getting medical care.  2. Do not go to work, school, or public areas.  3. Avoid using public transportation, ride-sharing, or taxis.  4. Separate yourself from other people and animals in your home.  5. Call ahead before visiting your doctor.  6. Wear a facemask.  7. Cover your coughs and sneezes.  8. Clean your hands often.  9. Avoid sharing personal household items.  10. Clean all “high-touch” surfaces everyday.  11. Monitor your symptoms.  If you have a medical emergency and need to call 911, notify the dispatch personnel that you have COVID-19 If possible, put on a facemask before emergency medical services arrive.  12. Stopping home isolation.  Patients with confirmed COVID-19 should remain under home isolation precautions for 14 days since the positive COVID-19 test and until the risk of secondary transmission to others is thought to be low. The decision to discontinue home isolation precautions should be made on a case-by-case basis, in consultation with healthcare providers and state and local health departments. Your [facility] Department of Health can be reached at [phone] for further information about COVID-19.   You will be going to a hotel to isolate. There is no need for oxygen at this time. Please return if you have any shortness of breath or high fever    Diagnosis: Uncomplicated alcohol dependence  Assessment and Plan of Treatment: Attend alcoholism treatment program, practice the Twelve Steps of AA.  Go to meetings to help you cope with uncomfortable feelings, and to help you develop a relapse prevention plan to prevent complications associated with alcohol intoxication.    Diagnosis: Hypertriglyceridemia  Assessment and Plan of Treatment: Your triglycerides are elevated. It is important to watch your diet and see your primary care physician for further care     PRINCIPAL DISCHARGE DIAGNOSIS  Diagnosis: Pancreatitis  Assessment and Plan of Treatment: You were diagnosed with pancreatitis. It is important to continue to take Fenofibrate, lovaza, and atorvastatin along with your other medications every day. You should also consider to stop drinking as this is a risk factor for pancreatitis. Be sure to follow up with your doctor. YOUR NURSE WILL  THESE MEDS FROM [facility] FOR YOU PRIOR TO YOUR LEAVING. Please call the  Lipid center Located [address] [place] [address]   [phone] to make an appointment next week. Also make an appointment with your attending. Information provided      SECONDARY DISCHARGE DIAGNOSES  Diagnosis: COVID-19  Assessment and Plan of Treatment: You have been diagnosed with the COVID-19 virus during your hospital stay. You must self quarantine to complete a 14 day time period.  Monitor for fevers, shortness of breath and cough primarily.  Monitor your temperature daily to notice any changes and increases.    It has been determined that you no longer need hospitalization and can recover while remaining in self-quarantine at home. You should follow the prevention steps below until a healthcare provider or local or state health department says you can return to your normal activities.  1. You should restrict activities outside your home, except for getting medical care.  2. Do not go to work, school, or public areas.  3. Avoid using public transportation, ride-sharing, or taxis.  4. Separate yourself from other people and animals in your home.  5. Call ahead before visiting your doctor.  6. Wear a facemask.  7. Cover your coughs and sneezes.  8. Clean your hands often.  9. Avoid sharing personal household items.  10. Clean all “high-touch” surfaces everyday.  11. Monitor your symptoms.  If you have a medical emergency and need to call 911, notify the dispatch personnel that you have COVID-19 If possible, put on a facemask before emergency medical services arrive.  12. Stopping home isolation.  Patients with confirmed COVID-19 should remain under home isolation precautions for 14 days since the positive COVID-19 test and until the risk of secondary transmission to others is thought to be low. The decision to discontinue home isolation precautions should be made on a case-by-case basis, in consultation with healthcare providers and state and local health departments. Your [facility] Department of Health can be reached at [phone] for further information about COVID-19.   You will be going to a hotel to isolate. There is no need for oxygen at this time. Please return if you have any shortness of breath or high fever    Diagnosis: Uncomplicated alcohol dependence  Assessment and Plan of Treatment: Attend alcoholism treatment program, practice the Twelve Steps of AA.  Go to meetings to help you cope with uncomfortable feelings, and to help you develop a relapse prevention plan to prevent complications associated with alcohol intoxication.    Diagnosis: Hypertriglyceridemia  Assessment and Plan of Treatment: Your triglycerides are elevated. It is important to watch your diet and see your primary care physician for further care     PRINCIPAL DISCHARGE DIAGNOSIS  Diagnosis: Pancreatitis  Assessment and Plan of Treatment: You were diagnosed with pancreatitis. It is important to continue to take Fenofibrate, lovaza, and atorvastatin along with your other medications every day. You should also consider to stop drinking as this is a risk factor for pancreatitis. Be sure to follow up with your doctor. YOUR NURSE WILL  THESE MEDS FROM [facility] FOR YOU PRIOR TO YOUR LEAVING. Please call the  Lipid center Located [address] [place] [address]   [phone] to make an appointment next week. Also make an appointment with your attending. Information provided      SECONDARY DISCHARGE DIAGNOSES  Diagnosis: Uncomplicated alcohol dependence  Assessment and Plan of Treatment: Attend alcoholism treatment program, practice the Twelve Steps of AA.  Go to meetings to help you cope with uncomfortable feelings, and to help you develop a relapse prevention plan to prevent complications associated with alcohol intoxication.    Diagnosis: COVID-19  Assessment and Plan of Treatment: You have been diagnosed with the COVID-19 virus during your hospital stay. You must self quarantine to complete a 14 day time period.  Monitor for fevers, shortness of breath and cough primarily.  Monitor your temperature daily to notice any changes and increases.    It has been determined that you no longer need hospitalization and can recover while remaining in self-quarantine at home. You should follow the prevention steps below until a healthcare provider or local or state health department says you can return to your normal activities.  1. You should restrict activities outside your home, except for getting medical care.  2. Do not go to work, school, or public areas.  3. Avoid using public transportation, ride-sharing, or taxis.  4. Separate yourself from other people and animals in your home.  5. Call ahead before visiting your doctor.  6. Wear a facemask.  7. Cover your coughs and sneezes.  8. Clean your hands often.  9. Avoid sharing personal household items.  10. Clean all “high-touch” surfaces everyday.  11. Monitor your symptoms.  If you have a medical emergency and need to call 911, notify the dispatch personnel that you have COVID-19 If possible, put on a facemask before emergency medical services arrive.  12. Stopping home isolation.  Patients with confirmed COVID-19 should remain under home isolation precautions for 14 days since the positive COVID-19 test and until the risk of secondary transmission to others is thought to be low. The decision to discontinue home isolation precautions should be made on a case-by-case basis, in consultation with healthcare providers and state and local health departments. Your [facility] Department of Health can be reached at [phone] for further information about COVID-19.   You will be going to a hotel to isolate. There is no need for oxygen at this time. Please return if you have any shortness of breath or high fever    Diagnosis: Hypertriglyceridemia  Assessment and Plan of Treatment: Your triglycerides are elevated. It is important to watch your diet and see your primary care physician for further care

## 2021-03-10 NOTE — DISCHARGE NOTE PROVIDER - PROVIDER TOKENS
FREE:[LAST:[Lipid],FIRST:[Center],PHONE:[phone]],FAX:[(   [phone]    -],ADDRESS:[[address] [place] [address]]],PROVIDER:[TOKEN:[[id number]],FOLLOWUP:[1 week]]

## 2021-03-10 NOTE — PROGRESS NOTE ADULT - ASSESSMENT
50 y/o male with pmh of hyperlipidemia and previous admission of pancreatitis in the setting of hypertriglyceridemia admitted for recurrent pancreatitis in the setting of hypertriglyceridemia, requiring an insulin drip, s/p ICU and now improved, finishing phenobarb taper today and planning dispo to [facility].

## 2021-03-10 NOTE — DISCHARGE NOTE PROVIDER - CARE PROVIDER_API CALL
[facility], Center  [address]  Phone: [phone]  Fax: (   )    -  Follow Up Time:     Joan Martínez (DO)  Internal Medicine  [address] [place] [address]  Phone: [phone]  Fax: [phone]  Follow Up Time: 1 week

## 2021-03-10 NOTE — DISCHARGE NOTE PROVIDER - NSDCMRMEDTOKEN_GEN_ALL_CORE_FT
atorvastatin 40 mg oral tablet: 1 tab(s) orally once a day (at bedtime)  fenofibrate 145 mg oral tablet: 1 tab(s) orally once a day  folic acid 1 mg oral tablet: 1 tab(s) orally once a day  Multiple Vitamins oral tablet: 1 tab(s) orally once a day  omega-3 polyunsaturated fatty acids ethyl esters 1000 mg oral capsule: 2 cap(s) orally 2 times a day  oxycodone-acetaminophen 5 mg-300 mg oral tablet: 1 tab(s) orally every 6 hours, As Needed -for severe pain MDD:4 tabs   thiamine 100 mg oral tablet: 1 tab(s) orally once a day  traMADol 50 mg oral tablet: 25 milligram(s) orally 2 times a day, As Needed -for moderate pain MDD:MDD 50 mg   atorvastatin 40 mg oral tablet: 1 tab(s) orally once a day (at bedtime)  fenofibrate 145 mg oral tablet: 1 tab(s) orally once a day  folic acid 1 mg oral tablet: 1 tab(s) orally once a day  Multiple Vitamins oral tablet: 1 tab(s) orally once a day  omega-3 polyunsaturated fatty acids ethyl esters 1000 mg oral capsule: 2 cap(s) orally 2 times a day  oxycodone-acetaminophen 5 mg-300 mg oral tablet: 1 tab(s) orally every 6 hours, As Needed -for severe pain MDD:4 tabs   thiamine 100 mg oral tablet: 1 tab(s) orally once a day  traMADol 50 mg oral tablet: 25 milligram(s) orally 2 times a day, As Needed -for moderate pain MDD:MDD 50 mg  Vascepa 1 g oral capsule: 2 cap(s) orally 2 times a day

## 2021-03-10 NOTE — PROGRESS NOTE ADULT - PROBLEM SELECTOR PLAN 5
Full code  Lovenox for DVT ppx while inpatient  Regular diet Full code  Lovenox for DVT ppx while inpatient  Regular diet  Discharge planning discussion took 35 minutes.

## 2021-03-11 ENCOUNTER — TRANSCRIPTION ENCOUNTER (OUTPATIENT)
Age: 51
End: 2021-03-11

## 2021-03-11 VITALS
OXYGEN SATURATION: 100 % | TEMPERATURE: 99 F | DIASTOLIC BLOOD PRESSURE: 82 MMHG | RESPIRATION RATE: 18 BRPM | HEART RATE: 69 BPM | SYSTOLIC BLOOD PRESSURE: 124 MMHG

## 2021-03-11 LAB
ANION GAP SERPL CALC-SCNC: 12 MMOL/L — SIGNIFICANT CHANGE UP (ref 7–14)
BUN SERPL-MCNC: 9 MG/DL — SIGNIFICANT CHANGE UP (ref 7–23)
CALCIUM SERPL-MCNC: 9.5 MG/DL — SIGNIFICANT CHANGE UP (ref 8.4–10.5)
CHLORIDE SERPL-SCNC: 101 MMOL/L — SIGNIFICANT CHANGE UP (ref 98–107)
CO2 SERPL-SCNC: 23 MMOL/L — SIGNIFICANT CHANGE UP (ref 22–31)
CREAT SERPL-MCNC: 0.76 MG/DL — SIGNIFICANT CHANGE UP (ref 0.5–1.3)
GLUCOSE BLDC GLUCOMTR-MCNC: 106 MG/DL — HIGH (ref 70–99)
GLUCOSE BLDC GLUCOMTR-MCNC: 94 MG/DL — SIGNIFICANT CHANGE UP (ref 70–99)
GLUCOSE BLDC GLUCOMTR-MCNC: 99 MG/DL — SIGNIFICANT CHANGE UP (ref 70–99)
GLUCOSE SERPL-MCNC: 84 MG/DL — SIGNIFICANT CHANGE UP (ref 70–99)
HCT VFR BLD CALC: 42.1 % — SIGNIFICANT CHANGE UP (ref 39–50)
HGB BLD-MCNC: 13.6 G/DL — SIGNIFICANT CHANGE UP (ref 13–17)
MAGNESIUM SERPL-MCNC: 1.9 MG/DL — SIGNIFICANT CHANGE UP (ref 1.6–2.6)
MCHC RBC-ENTMCNC: 32.3 GM/DL — SIGNIFICANT CHANGE UP (ref 32–36)
MCHC RBC-ENTMCNC: 34 PG — SIGNIFICANT CHANGE UP (ref 27–34)
MCV RBC AUTO: 105.3 FL — HIGH (ref 80–100)
NRBC # BLD: 0 /100 WBCS — SIGNIFICANT CHANGE UP
NRBC # FLD: 0 K/UL — SIGNIFICANT CHANGE UP
PHOSPHATE SERPL-MCNC: 3.2 MG/DL — SIGNIFICANT CHANGE UP (ref 2.5–4.5)
PLATELET # BLD AUTO: 108 K/UL — LOW (ref 150–400)
POTASSIUM SERPL-MCNC: 4.1 MMOL/L — SIGNIFICANT CHANGE UP (ref 3.5–5.3)
POTASSIUM SERPL-SCNC: 4.1 MMOL/L — SIGNIFICANT CHANGE UP (ref 3.5–5.3)
RBC # BLD: 4 M/UL — LOW (ref 4.2–5.8)
RBC # FLD: 11.8 % — SIGNIFICANT CHANGE UP (ref 10.3–14.5)
SODIUM SERPL-SCNC: 136 MMOL/L — SIGNIFICANT CHANGE UP (ref 135–145)
WBC # BLD: 6.72 K/UL — SIGNIFICANT CHANGE UP (ref 3.8–10.5)
WBC # FLD AUTO: 6.72 K/UL — SIGNIFICANT CHANGE UP (ref 3.8–10.5)

## 2021-03-11 PROCEDURE — 99239 HOSP IP/OBS DSCHRG MGMT >30: CPT

## 2021-03-11 RX ORDER — FOLIC ACID 0.8 MG
1 TABLET ORAL
Qty: 30 | Refills: 0
Start: 2021-03-11 | End: 2021-04-09

## 2021-03-11 RX ORDER — THIAMINE MONONITRATE (VIT B1) 100 MG
1 TABLET ORAL
Qty: 30 | Refills: 0
Start: 2021-03-11 | End: 2021-04-09

## 2021-03-11 RX ADMIN — Medication 2 GRAM(S): at 05:26

## 2021-03-11 RX ADMIN — Medication 145 MILLIGRAM(S): at 11:43

## 2021-03-11 RX ADMIN — CHLORHEXIDINE GLUCONATE 1 APPLICATION(S): 213 SOLUTION TOPICAL at 05:28

## 2021-03-11 NOTE — PROGRESS NOTE ADULT - PROBLEM SELECTOR PLAN 5
Full code  Lovenox for DVT ppx while inpatient  Regular diet  Discharge planning discussion took 35 minutes.

## 2021-03-11 NOTE — PROGRESS NOTE ADULT - ASSESSMENT
50 y/o male with pmh of hyperlipidemia and previous admission of pancreatitis in the setting of hypertriglyceridemia admitted for recurrent pancreatitis in the setting of hypertriglyceridemia, requiring an insulin drip, s/p ICU and now improved s/p phenobarb taper planning dispo to [facility], plan for today.

## 2021-03-11 NOTE — DISCHARGE NOTE NURSING/CASE MANAGEMENT/SOCIAL WORK - PATIENT PORTAL LINK FT
You can access the FollowMyHealth Patient Portal offered by [facility] by registering at the following website: [email or web address]. By joining [employer]’s FollowMyHealth portal, you will also be able to view your health information using other applications (apps) compatible with our system.

## 2021-03-11 NOTE — PROGRESS NOTE ADULT - SUBJECTIVE AND OBJECTIVE BOX
Kee Pinon M.D.  Hospitalist 8am-7pm  Division of Hospital Medicine  [facility]  cell [phone]  pager [phone]    Patient is a 51y old  Male who presents with a chief complaint of Pancreatitis (10 Mar 2021 14:16)    SUBJECTIVE / OVERNIGHT EVENTS: Patient seen and examined. No acute overnight events, no subjective complaints.   Patient denies chest pain, shortness of breath, fevers.  Tele events, if applicable:  SPO2:    OBJECTIVE:  Vital Signs Last 24 Hrs  T(C): 36.5 (11 Mar 2021 05:25), Max: 36.8 (10 Mar 2021 22:21)  T(F): 97.7 (11 Mar 2021 05:25), Max: 98.2 (10 Mar 2021 22:21)  HR: 68 (11 Mar 2021 05:25) (64 - 68)  BP: 121/71 (11 Mar 2021 05:25) (121/71 - 143/74)  BP(mean): --  RR: 18 (11 Mar 2021 05:25) (18 - 18)  SpO2: 100% (11 Mar 2021 05:25) (99% - 100%)    I&O's Summary    Physical Examination:  GEN: Well appearing, in NAD  EYES: PERRL, EOMI, no scleral icterus  HEAD/NECK: Normocephalic, atraumatic  RESP: no accessory muscle use, B/L air entry, CTAB   CARDIO: regular rate/rhythm  ABD: soft, NT, ND, +BS  PSYCH: A&Ox3, normal affect  SKIN: warm, dry, in tact, no rashes  NEURO: no focal neurologic deficits appreciated  EXT: no lower extremity edema, no cyanosis  VASC: good peripheral pulses      (03-11 @ 07:46)                      13.6  6.72 )-----------( 108                 42.1    Neutrophils = -- (--%)  Lymphocytes = -- (--%)  Eosinophils = -- (--%)  Basophils = -- (--%)  Monocytes = -- (--%)  Bands = --%    03-11    136  |  101  |  9   ----------------------------<  84  4.1   |  23  |  0.76    Ca    9.5      11 Mar 2021 07:46  Phos  3.2     03-11  Mg     1.9     03-11    TPro  6.4  /  Alb  3.5  /  TBili  0.6  /  DBili  x   /  AST  41<H>  /  ALT  58<H>  /  AlkPhos  76  03-10    Triglycerides, Serum: 184 mg/dL (03.10.21 @ 07:41)       Historical Values  Triglycerides, Serum: 184 mg/dL (03.10.21 @ 07:41)   Triglycerides, Serum: 258 mg/dL (03.09.21 @ 07:19)   Triglycerides, Serum: 249 mg/dL (03.08.21 @ 21:30)   Triglycerides, Serum: 417 mg/dL (03.08.21 @ 03:28)   Triglycerides, Serum: 383 mg/dL (03.07.21 @ 22:22)   Triglycerides, Serum: 588 mg/dL (03.07.21 @ 17:43)   Triglycerides, Serum: 1017 mg/dL (03.07.21 @ 12:57)     I&O's Summary    IMPROVE VTE Individual Risk Assessment          RISK                                                          Points  [  ] Previous VTE                                                3  [  ] Thrombophilia                                             2  [  ] Lower limb paralysis                                   2        (unable to hold up >15 seconds)    [  ] Current Cancer                                             2         (within 6 months)  [  ] Immobilization > 24 hrs                              1  [  ] ICU/CCU stay > 24 hours                             1  [  ] Age > 60                                                         1    IMPROVE VTE Score:         [         ]    Total Risk Factor Score:    0 - 1:   Consider IPC  >2 - 3:  Thromboprophylaxis required (enoxaparin or SQ heparin)        >4:   High Risk: Thromboprophylaxis required (enoxaparin or SQ heparin), optional add IPC  **If CONTRAINDICATION to enoxaparin or SQ heparin, USE IPCs**    Consultant(s) Notes Reviewed: endocrinology 3/10    MEDICATIONS  (STANDING):  atorvastatin 40 milliGRAM(s) Oral at bedtime  chlorhexidine 4% Liquid 1 Application(s) Topical <User Schedule>  dextrose 40% Gel 15 Gram(s) Oral once  dextrose 50% Injectable 25 Gram(s) IV Push once  dextrose 50% Injectable 25 Gram(s) IV Push once  dextrose 50% Injectable 12.5 Gram(s) IV Push once  enoxaparin Injectable 40 milliGRAM(s) SubCutaneous at bedtime  fenofibrate Tablet 145 milliGRAM(s) Oral daily  glucagon  Injectable 1 milliGRAM(s) IntraMuscular once  omega-3-Acid Ethyl Esters 2 Gram(s) Oral two times a day    MEDICATIONS  (PRN):

## 2021-03-11 NOTE — PROGRESS NOTE ADULT - PROBLEM SELECTOR PROBLEM 2
Hypertriglyceridemia
Acute pancreatitis, unspecified complication status, unspecified pancreatitis type
Hypertriglyceridemia

## 2021-03-11 NOTE — PROGRESS NOTE ADULT - PROBLEM SELECTOR PROBLEM 3
Alcohol withdrawal syndrome with complication
Uncomplicated alcohol dependence
Alcohol withdrawal syndrome with complication

## 2021-03-11 NOTE — DISCHARGE NOTE NURSING/CASE MANAGEMENT/SOCIAL WORK - NSDCFUADDAPPT_GEN_ALL_CORE_FT
Follow up with Endocrinology, Primary Care Doctor and in the Lipid Center 1 week after discharge. Follow up with Endocrinology, Primary Care Doctor and in the Lipid Center 1 week after discharge.    Patient's transportation to [facility] has been arranged by [facility] for 4:15p.m. pick-up

## 2021-03-11 NOTE — DISCHARGE NOTE NURSING/CASE MANAGEMENT/SOCIAL WORK - NSDCCRNAME_GEN_ALL_CORE_FT
[facility] ([address] [place] [phone])

## 2021-03-11 NOTE — PROGRESS NOTE ADULT - PROBLEM SELECTOR PLAN 4
Elevated in setting of elevated transaminases  Suggestive of alcohol use  Trend LFTs
Stable, mild in setting of ETOH use
Stable, mild in setting of ETOH use
stable  ETOH use

## 2021-03-11 NOTE — PROGRESS NOTE ADULT - PROBLEM SELECTOR PLAN 3
Patient is not interested in substance abuse referral at this time.
Will expedite end of phenobarb taper in anticipation of d/c
c/w phenobarb taper
s/p phenobarb taper  counseling completed during inpt stay
Will expedite end of phenobarb taper in anticipation of d/c

## 2021-03-11 NOTE — PROGRESS NOTE ADULT - PROBLEM SELECTOR PLAN 2
Resolved  outpatient endo
Elevated triglycerides: 3127 on admission, now downtrended to 300-400's  Start on Insulin gtt  Endocrinology consulted for further management
Resolving.  Patient advised to avoid fatty foods, simple sugars and ETOH use and adhere to medications to control TG level  Recommend Nutrition consult.
Tri's continue to downtrend  Appreciate endocrine recs  Endo outpatient f/u
Tri's continue to downtrend, 184 today  Appreciate endocrine recs  Endo outpatient f/u

## 2021-03-12 ENCOUNTER — TRANSCRIPTION ENCOUNTER (OUTPATIENT)
Age: 51
End: 2021-03-12

## 2021-03-16 ENCOUNTER — TRANSCRIPTION ENCOUNTER (OUTPATIENT)
Age: 51
End: 2021-03-16

## 2021-05-11 NOTE — ED ADULT TRIAGE NOTE - HEART RATE (BEATS/MIN)
breath sounds bilateral/breath sounds equal/positive end tidal CO2 noted/CXR pending/chest excursion noted/skin color improved
115

## 2021-07-28 NOTE — ED PROVIDER NOTE - NS_EDPROVIDERDISPOUSERTYPE_ED_A_ED
Attending Attestation (For Attendings USE Only)... Xolair Pregnancy And Lactation Text: This medication is Pregnancy Category B and is considered safe during pregnancy. This medication is excreted in breast milk.

## 2022-01-24 ENCOUNTER — EMERGENCY (EMERGENCY)
Facility: HOSPITAL | Age: 52
LOS: 1 days | Discharge: HOME | End: 2022-01-24
Attending: EMERGENCY MEDICINE | Admitting: EMERGENCY MEDICINE
Payer: MEDICAID

## 2022-01-24 VITALS
HEART RATE: 82 BPM | SYSTOLIC BLOOD PRESSURE: 138 MMHG | DIASTOLIC BLOOD PRESSURE: 90 MMHG | TEMPERATURE: 99 F | RESPIRATION RATE: 18 BRPM | HEIGHT: 67 IN | OXYGEN SATURATION: 99 %

## 2022-01-24 VITALS
DIASTOLIC BLOOD PRESSURE: 72 MMHG | SYSTOLIC BLOOD PRESSURE: 123 MMHG | HEART RATE: 65 BPM | RESPIRATION RATE: 18 BRPM | OXYGEN SATURATION: 99 %

## 2022-01-24 LAB
ALBUMIN SERPL ELPH-MCNC: 4.5 G/DL — SIGNIFICANT CHANGE UP (ref 3.3–5)
ALP SERPL-CCNC: 76 U/L — SIGNIFICANT CHANGE UP (ref 40–120)
ALT FLD-CCNC: 16 U/L — SIGNIFICANT CHANGE UP (ref 4–41)
ANION GAP SERPL CALC-SCNC: 12 MMOL/L — SIGNIFICANT CHANGE UP (ref 7–14)
APTT BLD: 31.8 SEC — SIGNIFICANT CHANGE UP (ref 27–36.3)
AST SERPL-CCNC: 24 U/L — SIGNIFICANT CHANGE UP (ref 4–40)
BASOPHILS # BLD AUTO: 0.02 K/UL — SIGNIFICANT CHANGE UP (ref 0–0.2)
BASOPHILS NFR BLD AUTO: 0.2 % — SIGNIFICANT CHANGE UP (ref 0–2)
BILIRUB SERPL-MCNC: 0.8 MG/DL — SIGNIFICANT CHANGE UP (ref 0.2–1.2)
BLD GP AB SCN SERPL QL: NEGATIVE — SIGNIFICANT CHANGE UP
BUN SERPL-MCNC: 8 MG/DL — SIGNIFICANT CHANGE UP (ref 7–23)
CALCIUM SERPL-MCNC: 10.2 MG/DL — SIGNIFICANT CHANGE UP (ref 8.4–10.5)
CHLORIDE SERPL-SCNC: 105 MMOL/L — SIGNIFICANT CHANGE UP (ref 98–107)
CO2 SERPL-SCNC: 24 MMOL/L — SIGNIFICANT CHANGE UP (ref 22–31)
CREAT SERPL-MCNC: 0.92 MG/DL — SIGNIFICANT CHANGE UP (ref 0.5–1.3)
EOSINOPHIL # BLD AUTO: 0.13 K/UL — SIGNIFICANT CHANGE UP (ref 0–0.5)
EOSINOPHIL NFR BLD AUTO: 1.4 % — SIGNIFICANT CHANGE UP (ref 0–6)
GLUCOSE SERPL-MCNC: 88 MG/DL — SIGNIFICANT CHANGE UP (ref 70–99)
HCT VFR BLD CALC: 41.4 % — SIGNIFICANT CHANGE UP (ref 39–50)
HGB BLD-MCNC: 14.1 G/DL — SIGNIFICANT CHANGE UP (ref 13–17)
IANC: 6.04 K/UL — SIGNIFICANT CHANGE UP (ref 1.5–8.5)
IMM GRANULOCYTES NFR BLD AUTO: 0.4 % — SIGNIFICANT CHANGE UP (ref 0–1.5)
INR BLD: 1.17 RATIO — HIGH (ref 0.88–1.16)
LIDOCAIN IGE QN: 60 U/L — SIGNIFICANT CHANGE UP (ref 7–60)
LYMPHOCYTES # BLD AUTO: 2.37 K/UL — SIGNIFICANT CHANGE UP (ref 1–3.3)
LYMPHOCYTES # BLD AUTO: 25.6 % — SIGNIFICANT CHANGE UP (ref 13–44)
MCHC RBC-ENTMCNC: 33.7 PG — SIGNIFICANT CHANGE UP (ref 27–34)
MCHC RBC-ENTMCNC: 34.1 GM/DL — SIGNIFICANT CHANGE UP (ref 32–36)
MCV RBC AUTO: 99 FL — SIGNIFICANT CHANGE UP (ref 80–100)
MONOCYTES # BLD AUTO: 0.65 K/UL — SIGNIFICANT CHANGE UP (ref 0–0.9)
MONOCYTES NFR BLD AUTO: 7 % — SIGNIFICANT CHANGE UP (ref 2–14)
NEUTROPHILS # BLD AUTO: 6.04 K/UL — SIGNIFICANT CHANGE UP (ref 1.8–7.4)
NEUTROPHILS NFR BLD AUTO: 65.4 % — SIGNIFICANT CHANGE UP (ref 43–77)
NRBC # BLD: 0 /100 WBCS — SIGNIFICANT CHANGE UP
NRBC # FLD: 0 K/UL — SIGNIFICANT CHANGE UP
PLATELET # BLD AUTO: 208 K/UL — SIGNIFICANT CHANGE UP (ref 150–400)
POTASSIUM SERPL-MCNC: 4 MMOL/L — SIGNIFICANT CHANGE UP (ref 3.5–5.3)
POTASSIUM SERPL-SCNC: 4 MMOL/L — SIGNIFICANT CHANGE UP (ref 3.5–5.3)
PROT SERPL-MCNC: 7.8 G/DL — SIGNIFICANT CHANGE UP (ref 6–8.3)
PROTHROM AB SERPL-ACNC: 13.4 SEC — SIGNIFICANT CHANGE UP (ref 10.6–13.6)
RBC # BLD: 4.18 M/UL — LOW (ref 4.2–5.8)
RBC # FLD: 11.6 % — SIGNIFICANT CHANGE UP (ref 10.3–14.5)
RH IG SCN BLD-IMP: POSITIVE — SIGNIFICANT CHANGE UP
SODIUM SERPL-SCNC: 141 MMOL/L — SIGNIFICANT CHANGE UP (ref 135–145)
WBC # BLD: 9.25 K/UL — SIGNIFICANT CHANGE UP (ref 3.8–10.5)
WBC # FLD AUTO: 9.25 K/UL — SIGNIFICANT CHANGE UP (ref 3.8–10.5)

## 2022-01-24 PROCEDURE — 99285 EMERGENCY DEPT VISIT HI MDM: CPT

## 2022-01-24 PROCEDURE — 76705 ECHO EXAM OF ABDOMEN: CPT | Mod: 26

## 2022-01-24 NOTE — ED ADULT TRIAGE NOTE - CHIEF COMPLAINT QUOTE
Pt c/o right sided abdominal pain that got worse today, states he has been having this pain for months. Pt also states he has been more anxious recently, denies SI/HI, auditory of visual hallucinations. Denies n/v, fevers/chills. Pt appears comfortable, eating in triage at this time. Pmhx: Anxiety

## 2022-01-24 NOTE — ED PROVIDER NOTE - ATTENDING CONTRIBUTION TO CARE
I performed a face-to-face evaluation of the patient and performed a history and physical examination. I agree with the history and physical examination. If this was a PA visit, I personally saw the patient with the PA and performed a substantive portion of the visit including all aspects of the medical decision making.     H/o ETOH abuse. C/o RUQ abd pain. Lab results unremarkable (not anemic, lipase WNL). Radiology results unremarkable (no e/o chadd). Likely ETOH-related proximal GI irritation. Dc home.

## 2022-01-24 NOTE — ED PROVIDER NOTE - CLINICAL SUMMARY MEDICAL DECISION MAKING FREE TEXT BOX
51 yo M with history of alcohol abuse and anxiety presents to Er with c/o of RUQ pain. Pt has symptoms to bilialy pathology. Will obtain routine labs RUQ sonogram. Pt is feeling anxious, non suicidal, non homicidal, will refer to outpatient psych clinic.

## 2022-01-24 NOTE — ED PROVIDER NOTE - OBJECTIVE STATEMENT
53 yo M with history of alcohol abuse and anxiety presents to Er with c/o of RUQ pain. Pt states he has felt more anxious in the last few days. Pt states pain as pressure and sensational. Pt has no radiation and pain is localized in RUQ.  Pt has no associated symptoms of nausea, vomiting, diarrhea, fever, and chills. Pt states he has a refill of Lorazepam from PCP. Pt denies suicidal and homicidal ideation. Pt has not drink in one month and no illicit drug use.

## 2022-01-24 NOTE — ED PROVIDER NOTE - HISTORY ATTESTATION, MLM
pt c/o CP for 2 days, "trying to get off opioids and alcohol" I have reviewed and confirmed nurses' notes...

## 2022-01-24 NOTE — ED ADULT NURSE NOTE - NS ED NURSE RECORD ANOTHER HT AND WT
From: Harshad Humphrey  To: Jim Gonzalez  Sent: 10/20/2021 1:35 PM CDT  Subject: ADHD prescription     Good afternoon,  My pharmacy informed me that my insurance won't fill my Adderall due to not having documentation of having tried Vyvanse. I have tried this in the past and it didn't not work out as it made me worse off. Is there any option to remedy the situation so I can get back on the meds I've been on for years now, as I know they work for me.     Thanks in advance for the help!   Have a great day.    No

## 2022-01-24 NOTE — ED PROVIDER NOTE - PATIENT PORTAL LINK FT
You can access the FollowMyHealth Patient Portal offered by [facility] by registering at the following website: [email or web address]. By joining [employer]’s FollowMyHealth portal, you will also be able to view your health information using other applications (apps) compatible with our system.

## 2022-01-24 NOTE — ED ADULT NURSE NOTE - OBJECTIVE STATEMENT
PT is a 52 year old male reporting to the ED for increased anxiety, restless ness for ~ 3 weeks. Prescribed lorazepam with minimal relief. Pt reports R side abdominal pain starting yesterday while at work. Pt is AOX4. Pt denies chest pain or SOB. PT respirations even an unlabored. Pt appears to be comfortable, in NAD. Pt denies fever, chills, n/v/d. Pt denies , dysuria, hematuria. No S/I, H/I, drug ot etoh use. 20 g iv place, labs drawn.

## 2022-04-30 ENCOUNTER — EMERGENCY (EMERGENCY)
Facility: HOSPITAL | Age: 52
LOS: 1 days | Discharge: HOME | End: 2022-04-30
Attending: EMERGENCY MEDICINE | Admitting: EMERGENCY MEDICINE
Payer: MEDICAID

## 2022-04-30 VITALS
DIASTOLIC BLOOD PRESSURE: 84 MMHG | OXYGEN SATURATION: 100 % | HEART RATE: 71 BPM | HEIGHT: 67 IN | SYSTOLIC BLOOD PRESSURE: 156 MMHG | TEMPERATURE: 98 F | RESPIRATION RATE: 16 BRPM

## 2022-04-30 PROBLEM — F10.10 ALCOHOL ABUSE, UNCOMPLICATED: Chronic | Status: ACTIVE | Noted: 2022-01-25

## 2022-04-30 PROBLEM — F41.9 ANXIETY DISORDER, UNSPECIFIED: Chronic | Status: ACTIVE | Noted: 2022-01-25

## 2022-04-30 LAB
ALBUMIN SERPL ELPH-MCNC: 4.1 G/DL — SIGNIFICANT CHANGE UP (ref 3.3–5)
ALP SERPL-CCNC: 81 U/L — SIGNIFICANT CHANGE UP (ref 40–120)
ALT FLD-CCNC: 16 U/L — SIGNIFICANT CHANGE UP (ref 4–41)
ANION GAP SERPL CALC-SCNC: 7 MMOL/L — SIGNIFICANT CHANGE UP (ref 7–14)
AST SERPL-CCNC: 20 U/L — SIGNIFICANT CHANGE UP (ref 4–40)
BILIRUB SERPL-MCNC: 0.5 MG/DL — SIGNIFICANT CHANGE UP (ref 0.2–1.2)
BUN SERPL-MCNC: 7 MG/DL — SIGNIFICANT CHANGE UP (ref 7–23)
CALCIUM SERPL-MCNC: 9.4 MG/DL — SIGNIFICANT CHANGE UP (ref 8.4–10.5)
CHLORIDE SERPL-SCNC: 103 MMOL/L — SIGNIFICANT CHANGE UP (ref 98–107)
CO2 SERPL-SCNC: 24 MMOL/L — SIGNIFICANT CHANGE UP (ref 22–31)
CREAT SERPL-MCNC: 0.74 MG/DL — SIGNIFICANT CHANGE UP (ref 0.5–1.3)
EGFR: 108 ML/MIN/1.73M2 — SIGNIFICANT CHANGE UP
GLUCOSE SERPL-MCNC: 89 MG/DL — SIGNIFICANT CHANGE UP (ref 70–99)
HCT VFR BLD CALC: 45 % — SIGNIFICANT CHANGE UP (ref 39–50)
HGB BLD-MCNC: 14.9 G/DL — SIGNIFICANT CHANGE UP (ref 13–17)
MCHC RBC-ENTMCNC: 31.4 PG — SIGNIFICANT CHANGE UP (ref 27–34)
MCHC RBC-ENTMCNC: 33.1 GM/DL — SIGNIFICANT CHANGE UP (ref 32–36)
MCV RBC AUTO: 94.9 FL — SIGNIFICANT CHANGE UP (ref 80–100)
NRBC # BLD: 0 /100 WBCS — SIGNIFICANT CHANGE UP
NRBC # FLD: 0 K/UL — SIGNIFICANT CHANGE UP
PLATELET # BLD AUTO: 171 K/UL — SIGNIFICANT CHANGE UP (ref 150–400)
POTASSIUM SERPL-MCNC: 4.4 MMOL/L — SIGNIFICANT CHANGE UP (ref 3.5–5.3)
POTASSIUM SERPL-SCNC: 4.4 MMOL/L — SIGNIFICANT CHANGE UP (ref 3.5–5.3)
PROT SERPL-MCNC: 7.1 G/DL — SIGNIFICANT CHANGE UP (ref 6–8.3)
RBC # BLD: 4.74 M/UL — SIGNIFICANT CHANGE UP (ref 4.2–5.8)
RBC # FLD: 12 % — SIGNIFICANT CHANGE UP (ref 10.3–14.5)
SARS-COV-2 RNA SPEC QL NAA+PROBE: SIGNIFICANT CHANGE UP
SODIUM SERPL-SCNC: 134 MMOL/L — LOW (ref 135–145)
WBC # BLD: 6.72 K/UL — SIGNIFICANT CHANGE UP (ref 3.8–10.5)
WBC # FLD AUTO: 6.72 K/UL — SIGNIFICANT CHANGE UP (ref 3.8–10.5)

## 2022-04-30 PROCEDURE — 72148 MRI LUMBAR SPINE W/O DYE: CPT | Mod: 26,ME

## 2022-04-30 PROCEDURE — 71046 X-RAY EXAM CHEST 2 VIEWS: CPT | Mod: 26

## 2022-04-30 PROCEDURE — G1004: CPT

## 2022-04-30 PROCEDURE — 99220: CPT

## 2022-04-30 RX ORDER — OXYCODONE AND ACETAMINOPHEN 5; 325 MG/1; MG/1
1 TABLET ORAL EVERY 4 HOURS
Refills: 0 | Status: DISCONTINUED | OUTPATIENT
Start: 2022-04-30 | End: 2022-04-30

## 2022-04-30 RX ORDER — LIDOCAINE 4 G/100G
2 CREAM TOPICAL ONCE
Refills: 0 | Status: COMPLETED | OUTPATIENT
Start: 2022-04-30 | End: 2022-04-30

## 2022-04-30 RX ORDER — LIDOCAINE 4 G/100G
1 CREAM TOPICAL ONCE
Refills: 0 | Status: COMPLETED | OUTPATIENT
Start: 2022-04-30 | End: 2022-04-30

## 2022-04-30 RX ORDER — DIAZEPAM 5 MG
5 TABLET ORAL EVERY 6 HOURS
Refills: 0 | Status: COMPLETED | OUTPATIENT
Start: 2022-04-30 | End: 2022-05-01

## 2022-04-30 RX ORDER — KETOROLAC TROMETHAMINE 30 MG/ML
15 SYRINGE (ML) INJECTION ONCE
Refills: 0 | Status: DISCONTINUED | OUTPATIENT
Start: 2022-04-30 | End: 2022-04-30

## 2022-04-30 RX ORDER — OXYCODONE AND ACETAMINOPHEN 5; 325 MG/1; MG/1
1 TABLET ORAL ONCE
Refills: 0 | Status: DISCONTINUED | OUTPATIENT
Start: 2022-04-30 | End: 2022-04-30

## 2022-04-30 RX ORDER — DIAZEPAM 5 MG
5 TABLET ORAL ONCE
Refills: 0 | Status: DISCONTINUED | OUTPATIENT
Start: 2022-04-30 | End: 2022-04-30

## 2022-04-30 RX ORDER — KETOROLAC TROMETHAMINE 30 MG/ML
30 SYRINGE (ML) INJECTION EVERY 6 HOURS
Refills: 0 | Status: COMPLETED | OUTPATIENT
Start: 2022-04-30 | End: 2022-05-01

## 2022-04-30 RX ADMIN — LIDOCAINE 1 PATCH: 4 CREAM TOPICAL at 13:21

## 2022-04-30 RX ADMIN — Medication 5 MILLIGRAM(S): at 14:19

## 2022-04-30 RX ADMIN — Medication 15 MILLIGRAM(S): at 13:21

## 2022-04-30 RX ADMIN — Medication 5 MILLIGRAM(S): at 20:58

## 2022-04-30 RX ADMIN — LIDOCAINE 2 PATCH: 4 CREAM TOPICAL at 20:56

## 2022-04-30 RX ADMIN — LIDOCAINE 1 PATCH: 4 CREAM TOPICAL at 20:56

## 2022-04-30 RX ADMIN — Medication 15 MILLIGRAM(S): at 14:57

## 2022-04-30 RX ADMIN — OXYCODONE AND ACETAMINOPHEN 1 TABLET(S): 5; 325 TABLET ORAL at 17:30

## 2022-04-30 RX ADMIN — OXYCODONE AND ACETAMINOPHEN 1 TABLET(S): 5; 325 TABLET ORAL at 16:12

## 2022-04-30 NOTE — ED PROVIDER NOTE - NSICDXPASTMEDICALHX_GEN_ALL_CORE_FT
PAST MEDICAL HISTORY:  Alcohol abuse     Anxiety     Dyslipidemia     Hypertension       Hyperlipidemia

## 2022-04-30 NOTE — ED CDU PROVIDER INITIAL DAY NOTE - PHYSICAL EXAMINATION
GEN - NAD; well appearing; A+O x3   	HEAD - NC/AT   	EYES- PERRL, EOMI  	ENT: Airway patent, mmm  	NECK: Neck supple  	PULMONARY - CTA b/l, symmetric breath sounds.   	CARDIAC -s1s2, RRR, no M,G,R  	ABDOMEN - +BS, ND, NT, soft, no guarding, no rebound, no masses   	BACK - no CVA tenderness, Normal  spine w/o TTP, +TTP to L paraspinal lumbosacral area  	EXTREMITIES - FROM, symmetric pulses, capillary refill < 2 seconds, no edema   	SKIN - no rash or bruising   	NEUROLOGIC - alert, speech clear, no focal deficits- full strength and sensation throughout UE and LE b/l, full sensation throughout                 PSYCH -nl mood/affect, nl insight.

## 2022-04-30 NOTE — ED ADULT TRIAGE NOTE - CHIEF COMPLAINT QUOTE
Pt c/o lower back pain for the past several days, lifts heavy objects at work. No PMH. Ambulatory in triage.

## 2022-04-30 NOTE — ED CDU PROVIDER INITIAL DAY NOTE - NS ED ATTENDING STATEMENT MOD
This was a shared visit with the ILA. I reviewed and verified the documentation and independently performed the documented:

## 2022-04-30 NOTE — ED CDU PROVIDER INITIAL DAY NOTE - WR ORDER ID 1
Breast MRI biopsy pathology results reviewed with the patient over the phone. These are benign and concordant with imaging. Reviewed remaining breast MRI findings with radiology. Given the most suspicious area was biopsied, patient does not need 6 month follow up imaging. As discussed with the patient previously, she has an elevated risk of future breast cancer, but does not meet high risk criteria accounting for her mother's diagnosis of LCIS and not cancer. Thus, as long as there are no other changes to her history, she may resume annual screening mammograms at age 35.
[id number]

## 2022-04-30 NOTE — ED PROVIDER NOTE - OBJECTIVE STATEMENT
53 y/o M w/ PMHx hyperlipidemia presents to the ED w/ 2 weeks of worsening left lower back pain. Pt has not taken meds for pain. Pt reports the pain became acutely worse in the last 3 days after he accidently hit into a metal pole carrying heavy objects as a result also has pain to the  anterior chest w/ point tenderness. Denies pain w/ breathing or SOB. Denies numbness, tingling, or changes in bowel/bladder, or no fevers. Pt reports he has not had a alcoholic drink in many months. 51 y/o M w/ PMHx hyperlipidemia presents to the ED w/ 2 weeks of worsening left lower back pain. Pt has not taken meds for pain. 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 He also reports L anterior chest pain t w/ point tenderness at point of impact with metal pole. No head trauma or LOC. Denies pain w/ breathing or SOB. Denies numbness, tingling, or changes in bowel/bladder habits, no fevers. Pt reports he has not had a alcoholic drink in many months.

## 2022-04-30 NOTE — ED CDU PROVIDER INITIAL DAY NOTE - MEDICAL DECISION MAKING DETAILS
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 No current neuro deficits, able to ambulate but with significant pain.

## 2022-04-30 NOTE — ED ADULT NURSE NOTE - OBJECTIVE STATEMENT
pt received in rm 16 AAO x 3. pt c/o lower back pain x 1 week. pt states he lifts heavy objects at work, pt denies any other complaints. RR even and unlabored. pt medicated as per MD orders.

## 2022-04-30 NOTE — ED PROVIDER NOTE - CLINICAL SUMMARY MEDICAL DECISION MAKING FREE TEXT BOX
51 y/o M presents to the ED c/o 2 weeks of left lower back pain w/ no neuro deficits or midline spinal tenderness. Pain is mostly w/ movement. Pt is also c/o left anterior chest pain after hitting a metal pole. No ecchymosis. Lungs clear. Possible rib fracture. Will obtain chest x-ray, pain control and d/c home w/ outpatient f/u if symptoms persist. 51 y/o M presents to the ED c/o 2 weeks of left lower back pain - reports lifting heavy objects in the course being a - pt has no neuro deficits or midline spinal tenderness, possible muscle spasm vs herniated disc. Pt is also c/o left anterior chest pain after hitting a metal pole as well. No ecchymosis. Lungs clear. Possible rib fracture given point tenderness- Will obtain chest x-ray, pain control and d/c home w/ outpatient f/u if symptoms persist.

## 2022-04-30 NOTE — ED CDU PROVIDER INITIAL DAY NOTE - OBJECTIVE STATEMENT
53 y/o M w/ PMHx hyperlipidemia presents to the ED w/ 2 weeks of worsening left lower back pain. Pt has not taken meds for pain. 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 He also reports L anterior chest pain t w/ point tenderness at point of impact with metal pole. No head trauma or LOC. Denies pain w/ breathing or SOB. Denies numbness, tingling, or changes in bowel/bladder habits, no fevers. Pt reports he has not had a alcoholic drink in many months.

## 2022-05-01 VITALS
OXYGEN SATURATION: 99 % | RESPIRATION RATE: 16 BRPM | HEART RATE: 63 BPM | TEMPERATURE: 99 F | DIASTOLIC BLOOD PRESSURE: 84 MMHG | SYSTOLIC BLOOD PRESSURE: 142 MMHG

## 2022-05-01 PROCEDURE — 99217: CPT | Mod: FS

## 2022-05-01 RX ORDER — OXYCODONE AND ACETAMINOPHEN 5; 325 MG/1; MG/1
1 TABLET ORAL
Qty: 8 | Refills: 0
Start: 2022-05-01 | End: 2022-05-02

## 2022-05-01 RX ADMIN — Medication 30 MILLIGRAM(S): at 06:21

## 2022-05-01 RX ADMIN — Medication 30 MILLIGRAM(S): at 06:45

## 2022-05-01 RX ADMIN — LIDOCAINE 2 PATCH: 4 CREAM TOPICAL at 07:01

## 2022-05-01 RX ADMIN — Medication 30 MILLIGRAM(S): at 00:45

## 2022-05-01 RX ADMIN — Medication 5 MILLIGRAM(S): at 06:21

## 2022-05-01 RX ADMIN — Medication 30 MILLIGRAM(S): at 00:37

## 2022-05-01 NOTE — ED CDU PROVIDER SUBSEQUENT DAY NOTE - PHYSICAL EXAMINATION
CONSTITUTIONAL:  Well appearing, awake, alert, oriented to person, place, time/situation and in no apparent distress.  Pt. is objectively comfortable appearing and verbalizing in full, clear, effortless sentences.  ENMT: NC/AT.  Airway patent.  Nasal mucosa clear.  Moist mucous membranes.  Neck supple.  EYES:  Clear OU.  CARDIAC:  Normal rate, regular rhythm.  Heart sounds S1 S2.  No murmurs, gallops, or rubs.  RESPIRATORY:  Breath sounds clear and equal bilaterally.  No wheezes, no rales, no rhonchi.  GASTROINTESTINAL:  Abdomen soft, non-distended, non-tender.  No rebound, no guarding.  NEUROLOGICAL:  Alert and oriented to person/place/time/situation.  No focal deficits; no motor or sensory deficits.  No tremors noted.   MUSCULOSKELETAL:  Range of motion is slightly limited due to pain.  No midline TTP.  Left lower back paravertebral/soft tissue subjective TTP.   SKIN:  Skin color unremarkable.  Skin warm, dry.  PSYCHIATRIC:  Alert and oriented to person/place/time/situation.  Mood and affect WNL.  No apparent risk to self or others.

## 2022-05-01 NOTE — ED CDU PROVIDER DISPOSITION NOTE - PATIENT PORTAL LINK FT
You can access the FollowMyHealth Patient Portal offered by [facility] by registering at the following website: [email or web address]. By joining [email or web address]’s FollowMyHealth portal, you will also be able to view your health information using other applications (apps) compatible with our system.

## 2022-05-01 NOTE — ED CDU PROVIDER DISPOSITION NOTE - ATTENDING CONTRIBUTION TO CARE
singh: pt here is awake and alert.  pt sent to cdu for pain control for back pain, had not been taking meds at home.  here received percocet/ valium/ nsaids/ lidocaine patch.  pt much improved.  mri done, has some degenerative changes but no acute cord compression or narrowing.  pt does have hx alcohol abuse, would be judicious in the use of the controlled substances at home.  I discussed as well the value of omm for treatment of back pain and gave him [facility] clinic information.    I performed a history and physical exam of the patient and discussed their management with the resident and /or advanced care provider. I reviewed the resident and /or ACP's note and agree with the documented findings and plan of care. My medical decison making and observations are found above.

## 2022-05-01 NOTE — ED CDU PROVIDER DISPOSITION NOTE - NSFOLLOWUPINSTRUCTIONS_ED_ALL_ED_FT
you can consider following with the Osteopathic manipulative medicine clinic at [facility]  ([facility], [facility], [facility], [facility], and [facility])   [place]   [address]   [place] [address]   [phone]

## 2022-05-01 NOTE — ED CDU PROVIDER SUBSEQUENT DAY NOTE - HISTORY
51 yo male, PMH HTN, hyperlipidemia and past hx/o alcohol abuse (states last alcohol use was a few months ago), and anxiety, presented to the ED c/o worsening left lower back pain x 2 weeks.  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  Pt also noted left anterior chest pain at point of impact with metal pole. No head trauma or LOC. Denies pain w/ breathing or SOB.   In the ED, CBC/CMP grossly unremarkable, CXR with no acute pathology noted.  Pt. was dispo'd to CDU for MRI lumbar spine, pain control / supportive care, general observation care/monitoring.  In the interim, pt objectively noted to be resting comfortably; pt has been clinically stable; no issues or c/o thus far.  MRI was performed; official radiology results are pending.

## 2022-05-01 NOTE — ED CDU PROVIDER SUBSEQUENT DAY NOTE - MEDICAL DECISION MAKING DETAILS
MRI lumbar spine (performed; official radiology report pending), pain control / supportive care, general observation care/monitoring.

## 2022-05-01 NOTE — ED CDU PROVIDER DISPOSITION NOTE - CLINICAL COURSE
53 y/o male pmh ETOH abuse c/o low back pain x3 days. Pt had pain control medication while in ER which was inadequate and was sent to the CDU for further pain control and MRI. Pt did well overnight with no acute events. MRI was negative for acute pathology and pt was cleared for dc. Spine center info given to pt. stable for dc.

## 2022-05-01 NOTE — ED CDU PROVIDER SUBSEQUENT DAY NOTE - ATTENDING APP SHARED VISIT CONTRIBUTION OF CARE
singh: pt here is awake and alert.  pt sent to cdu for pain control for back pain, had not been taking meds at home.  here received percocet/ valium/ nsaids/ lidocaine patch.  pt much improved.  mri done, has some degenerative changes but no acute cord compression or narrowing.  pt does have hx alcohol abuse, would be judicious in the use of the controlled substances at home.  I discussed as well the value of omm for treatment of back pain and gave him [facility] clinic information.    I performed a history and physical exam of the patient and discussed their management with the resident and /or advanced care provider. I reviewed the resident and /or ACP's note and agree with the documented findings and plan of care. My medical decison making and observations are found above.

## 2022-05-02 PROBLEM — E78.5 HYPERLIPIDEMIA, UNSPECIFIED: Chronic | Status: ACTIVE | Noted: 2022-04-30

## 2022-05-05 ENCOUNTER — APPOINTMENT (OUTPATIENT)
Dept: ORTHOPEDIC SURGERY | Facility: CLINIC | Age: 52
End: 2022-05-05
Payer: MEDICAID

## 2022-05-05 VITALS
DIASTOLIC BLOOD PRESSURE: 90 MMHG | BODY MASS INDEX: 26.53 KG/M2 | WEIGHT: 169 LBS | HEIGHT: 67 IN | SYSTOLIC BLOOD PRESSURE: 153 MMHG | HEART RATE: 74 BPM

## 2022-05-05 DIAGNOSIS — G89.29 PAIN IN LEFT SHOULDER: ICD-10-CM

## 2022-05-05 DIAGNOSIS — M54.9 DORSALGIA, UNSPECIFIED: ICD-10-CM

## 2022-05-05 DIAGNOSIS — M25.512 PAIN IN LEFT SHOULDER: ICD-10-CM

## 2022-05-05 PROCEDURE — 99204 OFFICE O/P NEW MOD 45 MIN: CPT

## 2022-05-05 RX ORDER — DICLOFENAC SODIUM 50 MG/1
50 TABLET, DELAYED RELEASE ORAL
Qty: 28 | Refills: 0 | Status: ACTIVE | COMMUNITY
Start: 2022-05-05 | End: 1900-01-01

## 2022-05-05 NOTE — REVIEW OF SYSTEMS
[Negative] : Heme/Lymph [FreeTextEntry9] : Shoulder and back symptoms lumbar Physical Exam\par \par Gait - Normal\par \par Station - Normal\par \par Sagittal balance - Normal\par \par Compensatory mechanism? - None\par \par Heel walk - Normal\par \par Toe walk - Normal\par \par Reflexes\par Patellar - normal\par Gastroc - normal\par Clonus - No\par \par Hip Exam - Normal\par \par Straight leg raise - none\par \par Pulses - 2+ dp/pt\par \par Range of motion - normal\par \par Sensation \par Sensation intact to light touch in L1, L2, L3, L4, L5 and S1 dermatomes bilaterally\par \par Motor\par 	IP	Quad	HS	TA	Gastroc	EHL\par Right	5/5	5/5	5/5	5/5	5/5	5/5\par Left	5/5	5/5	5/5	5/5	5/5	5/5

## 2022-05-05 NOTE — REASON FOR VISIT
[Initial Visit] : an initial visit for [Back Pain] : back pain [FreeTextEntry2] : Left shoulder pain

## 2022-05-05 NOTE — PHYSICAL EXAM
[de-identified] : Lumbar Physical Exam\par \par Gait - Normal\par \par Station - Normal\par \par Sagittal balance - Normal\par \par Compensatory mechanism? - None\par \par Heel walk - Normal\par \par Toe walk - Normal\par \par Reflexes\par Patellar - normal\par Gastroc - normal\par Clonus - No\par \par Hip Exam - Normal\par \par Straight leg raise - none\par \par Pulses - 2+ dp/pt\par \par Range of motion - normal\par \par Sensation \par Sensation intact to light touch in L1, L2, L3, L4, L5 and S1 dermatomes bilaterally\par \par Motor\par 	IP	Quad	HS	TA	Gastroc	EHL\par Right	5/5	5/5	5/5	5/5	5/5	5/5\par Left	5/5	5/5	5/5	5/5	5/5	5/5\par \par Left shoulder exam\par Pain with supraspinatus testing\par Pain with external rotation of left shoulder\par  [de-identified] : Lumbar MRI reviewed\par No areas of critical central stenosis\par No areas critical foraminal stenosis

## 2022-05-05 NOTE — HISTORY OF PRESENT ILLNESS
[de-identified] : This is a 52-year-old male that is here today for evaluation of his left shoulder and back pain.  He has been dealing with left shoulder pain for the past month.  He states that he has been dealing with back pain for the past 2 weeks.  He denies any radicular symptoms down his arms or his legs.  He denies any bowel bladder issues.  He denies any saddle anesthesia.  He denies any issues with balance or hand or surgery.  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

## 2022-05-05 NOTE — ASSESSMENT
[FreeTextEntry1] : I had a lengthy discussion with the patient in regards to treatment plan and diagnosis. There are no red flag findings on imaging nor are there any red flag findings on clinical exam.  Therefore we will proceed with a course of conservative treatment.  This would include physical therapy/home exercise program, Tylenol, NSAIDs as medically indicated.  The patient will follow up with me in approximately 6 to 8 weeks.  I encouraged the patient to follow-up sooner if there are any new or worsening symptoms.  Of note the patient does have signs and symptoms concerning for rotator cuff pathology.  I would like him to do some home exercises and some physical therapy focused on his left shoulder as well.

## 2022-06-17 ENCOUNTER — APPOINTMENT (OUTPATIENT)
Dept: ORTHOPEDIC SURGERY | Facility: CLINIC | Age: 52
End: 2022-06-17

## 2023-01-18 ENCOUNTER — EMERGENCY (EMERGENCY)
Facility: HOSPITAL | Age: 53
LOS: 1 days | Discharge: HOME | End: 2023-01-18
Attending: EMERGENCY MEDICINE | Admitting: EMERGENCY MEDICINE
Payer: MEDICAID

## 2023-01-18 VITALS
RESPIRATION RATE: 18 BRPM | HEART RATE: 95 BPM | OXYGEN SATURATION: 97 % | DIASTOLIC BLOOD PRESSURE: 89 MMHG | SYSTOLIC BLOOD PRESSURE: 129 MMHG | TEMPERATURE: 98 F

## 2023-01-18 VITALS
DIASTOLIC BLOOD PRESSURE: 90 MMHG | HEART RATE: 94 BPM | RESPIRATION RATE: 17 BRPM | SYSTOLIC BLOOD PRESSURE: 143 MMHG | TEMPERATURE: 98 F | OXYGEN SATURATION: 100 %

## 2023-01-18 LAB
ALBUMIN SERPL ELPH-MCNC: 4.2 G/DL — SIGNIFICANT CHANGE UP (ref 3.3–5)
ALP SERPL-CCNC: 79 U/L — SIGNIFICANT CHANGE UP (ref 40–120)
ALT FLD-CCNC: 47 U/L — HIGH (ref 4–41)
ANION GAP SERPL CALC-SCNC: 15 MMOL/L — HIGH (ref 7–14)
AST SERPL-CCNC: 59 U/L — HIGH (ref 4–40)
BASE EXCESS BLDV CALC-SCNC: 2.4 MMOL/L — SIGNIFICANT CHANGE UP (ref -2–3)
BASOPHILS # BLD AUTO: 0.02 K/UL — SIGNIFICANT CHANGE UP (ref 0–0.2)
BASOPHILS NFR BLD AUTO: 0.2 % — SIGNIFICANT CHANGE UP (ref 0–2)
BILIRUB SERPL-MCNC: 2.1 MG/DL — HIGH (ref 0.2–1.2)
BLOOD GAS VENOUS COMPREHENSIVE RESULT: SIGNIFICANT CHANGE UP
BUN SERPL-MCNC: 16 MG/DL — SIGNIFICANT CHANGE UP (ref 7–23)
CALCIUM SERPL-MCNC: 9.4 MG/DL — SIGNIFICANT CHANGE UP (ref 8.4–10.5)
CHLORIDE BLDV-SCNC: 98 MMOL/L — SIGNIFICANT CHANGE UP (ref 96–108)
CHLORIDE SERPL-SCNC: 98 MMOL/L — SIGNIFICANT CHANGE UP (ref 98–107)
CO2 BLDV-SCNC: 30.4 MMOL/L — HIGH (ref 22–26)
CO2 SERPL-SCNC: 23 MMOL/L — SIGNIFICANT CHANGE UP (ref 22–31)
CREAT SERPL-MCNC: 0.94 MG/DL — SIGNIFICANT CHANGE UP (ref 0.5–1.3)
EGFR: 96 ML/MIN/1.73M2 — SIGNIFICANT CHANGE UP
EOSINOPHIL # BLD AUTO: 0.03 K/UL — SIGNIFICANT CHANGE UP (ref 0–0.5)
EOSINOPHIL NFR BLD AUTO: 0.2 % — SIGNIFICANT CHANGE UP (ref 0–6)
FLUAV AG NPH QL: SIGNIFICANT CHANGE UP
FLUBV AG NPH QL: SIGNIFICANT CHANGE UP
GAS PNL BLDV: 133 MMOL/L — LOW (ref 136–145)
GAS PNL BLDV: SIGNIFICANT CHANGE UP
GLUCOSE BLDV-MCNC: 86 MG/DL — SIGNIFICANT CHANGE UP (ref 70–99)
GLUCOSE SERPL-MCNC: 85 MG/DL — SIGNIFICANT CHANGE UP (ref 70–99)
HCO3 BLDV-SCNC: 29 MMOL/L — SIGNIFICANT CHANGE UP (ref 22–29)
HCT VFR BLD CALC: 48.4 % — SIGNIFICANT CHANGE UP (ref 39–50)
HCT VFR BLDA CALC: 51 % — SIGNIFICANT CHANGE UP (ref 39–51)
HGB BLD CALC-MCNC: 16.9 G/DL — SIGNIFICANT CHANGE UP (ref 13–17)
HGB BLD-MCNC: 16.3 G/DL — SIGNIFICANT CHANGE UP (ref 13–17)
IANC: 8.83 K/UL — HIGH (ref 1.8–7.4)
IMM GRANULOCYTES NFR BLD AUTO: 0.3 % — SIGNIFICANT CHANGE UP (ref 0–0.9)
LACTATE BLDV-MCNC: 2.2 MMOL/L — HIGH (ref 0.5–2)
LIDOCAIN IGE QN: 28 U/L — SIGNIFICANT CHANGE UP (ref 7–60)
LYMPHOCYTES # BLD AUTO: 2.85 K/UL — SIGNIFICANT CHANGE UP (ref 1–3.3)
LYMPHOCYTES # BLD AUTO: 22.7 % — SIGNIFICANT CHANGE UP (ref 13–44)
MCHC RBC-ENTMCNC: 33.2 PG — SIGNIFICANT CHANGE UP (ref 27–34)
MCHC RBC-ENTMCNC: 33.7 GM/DL — SIGNIFICANT CHANGE UP (ref 32–36)
MCV RBC AUTO: 98.6 FL — SIGNIFICANT CHANGE UP (ref 80–100)
MONOCYTES # BLD AUTO: 0.81 K/UL — SIGNIFICANT CHANGE UP (ref 0–0.9)
MONOCYTES NFR BLD AUTO: 6.4 % — SIGNIFICANT CHANGE UP (ref 2–14)
NEUTROPHILS # BLD AUTO: 8.83 K/UL — HIGH (ref 1.8–7.4)
NEUTROPHILS NFR BLD AUTO: 70.2 % — SIGNIFICANT CHANGE UP (ref 43–77)
NRBC # BLD: 0 /100 WBCS — SIGNIFICANT CHANGE UP (ref 0–0)
NRBC # FLD: 0 K/UL — SIGNIFICANT CHANGE UP (ref 0–0)
PCO2 BLDV: 50 MMHG — SIGNIFICANT CHANGE UP (ref 42–55)
PH BLDV: 7.37 — SIGNIFICANT CHANGE UP (ref 7.32–7.43)
PLATELET # BLD AUTO: 191 K/UL — SIGNIFICANT CHANGE UP (ref 150–400)
PO2 BLDV: 36 MMHG — SIGNIFICANT CHANGE UP
POTASSIUM BLDV-SCNC: 3.9 MMOL/L — SIGNIFICANT CHANGE UP (ref 3.5–5.1)
POTASSIUM SERPL-MCNC: 4.1 MMOL/L — SIGNIFICANT CHANGE UP (ref 3.5–5.3)
POTASSIUM SERPL-SCNC: 4.1 MMOL/L — SIGNIFICANT CHANGE UP (ref 3.5–5.3)
PROT SERPL-MCNC: 7.1 G/DL — SIGNIFICANT CHANGE UP (ref 6–8.3)
RBC # BLD: 4.91 M/UL — SIGNIFICANT CHANGE UP (ref 4.2–5.8)
RBC # FLD: 11.5 % — SIGNIFICANT CHANGE UP (ref 10.3–14.5)
RSV RNA NPH QL NAA+NON-PROBE: SIGNIFICANT CHANGE UP
SAO2 % BLDV: 55.5 % — SIGNIFICANT CHANGE UP
SARS-COV-2 RNA SPEC QL NAA+PROBE: SIGNIFICANT CHANGE UP
SODIUM SERPL-SCNC: 136 MMOL/L — SIGNIFICANT CHANGE UP (ref 135–145)
TROPONIN T, HIGH SENSITIVITY RESULT: 9 NG/L — SIGNIFICANT CHANGE UP
WBC # BLD: 12.58 K/UL — HIGH (ref 3.8–10.5)
WBC # FLD AUTO: 12.58 K/UL — HIGH (ref 3.8–10.5)

## 2023-01-18 PROCEDURE — 99284 EMERGENCY DEPT VISIT MOD MDM: CPT

## 2023-01-18 RX ORDER — ACETAMINOPHEN 500 MG
650 TABLET ORAL ONCE
Refills: 0 | Status: COMPLETED | OUTPATIENT
Start: 2023-01-18 | End: 2023-01-18

## 2023-01-18 RX ORDER — SODIUM CHLORIDE 9 MG/ML
1000 INJECTION INTRAMUSCULAR; INTRAVENOUS; SUBCUTANEOUS ONCE
Refills: 0 | Status: COMPLETED | OUTPATIENT
Start: 2023-01-18 | End: 2023-01-18

## 2023-01-18 RX ADMIN — Medication 650 MILLIGRAM(S): at 19:20

## 2023-01-18 RX ADMIN — SODIUM CHLORIDE 1000 MILLILITER(S): 9 INJECTION INTRAMUSCULAR; INTRAVENOUS; SUBCUTANEOUS at 19:20

## 2023-01-18 NOTE — ED ADULT NURSE NOTE - OBJECTIVE STATEMENT
patient presents to ed c/o vomiting x today. reports to episodes of NBNB emesis. denies any abdominal pain. denies cp, sob, fever/chills

## 2023-01-18 NOTE — ED PROVIDER NOTE - NSICDXPASTMEDICALHX_GEN_ALL_CORE_FT
PAST MEDICAL HISTORY:  Alcohol abuse     Anxiety     Dyslipidemia     Hyperlipidemia     Hypertension

## 2023-01-18 NOTE — ED ADULT TRIAGE NOTE - CHIEF COMPLAINT QUOTE
Pt presents to ED via EMS from home with c/o 2 episodes of vomiting, took unprescribed Xanax after feeling anxious, and now reports feeling lightheaded. Pt reports lightheadedness resolved, now feeling chest pressure.

## 2023-01-18 NOTE — ED PROVIDER NOTE - OBJECTIVE STATEMENT
53-year-old male past medical history lumbar back pain presents for vomiting, anxiety, episode of resolved dizziness.  Patient states that he awoke with nausea, had 2 episodes of nonbilious and nonbloody vomiting.  He then stood up and felt like he was going to pass out however did not experience any syncope.  This resolved after he sat down, however started to then feel anxious.  This episode of anxiousness was accompanied by brief period of chest tightness.  He took a tablet of alprazolam which is friend is prescribed.  The patient during my interview is asymptomatic, only feels "hungry".  He reports several alcoholic beverages last night, denies illicit drug use.  He is a tobacco user.  Denies SI.[facility].

## 2023-01-18 NOTE — ED PROVIDER NOTE - PATIENT PORTAL LINK FT
You can access the FollowMyHealth Patient Portal offered by [facility] by registering at the following website: [email or web address]. By joining [facility]’s FollowMyHealth portal, you will also be able to view your health information using other applications (apps) compatible with our system.

## 2023-01-18 NOTE — ED PROVIDER NOTE - NSFOLLOWUPINSTRUCTIONS_ED_ALL_ED_FT
Acute Nausea and Vomiting    WHAT YOU NEED TO KNOW:    Acute nausea and vomiting start suddenly, worsen quickly, and last a short time.    DISCHARGE INSTRUCTIONS:    Seek care immediately if:    You see blood in your vomit or your bowel movements.    You have sudden, severe pain in your chest and upper abdomen after hard vomiting or retching.    You have swelling in your neck and chest.    You are dizzy, cold, and thirsty and your eyes and mouth are dry.    You are urinating very little or not at all.    You have muscle weakness, leg cramps, and trouble breathing.    Your heart is beating much faster than normal.    You continue to vomit for more than 48 hours.  Contact your healthcare provider if:    You have frequent dry heaves (vomiting but nothing comes out).    Your nausea and vomiting does not get better or go away after you use medicine.    You have questions or concerns about your condition or treatment.  Medicines: You may need any of the following:    Medicines may be given to calm your stomach and stop your vomiting. You may also need medicines to help you feel more relaxed or to stop nausea and vomiting caused by motion sickness.    Gastrointestinal stimulants are used to help empty your stomach and bowels. This may help decrease nausea and vomiting.    Take your medicine as directed. Contact your healthcare provider if you think your medicine is not helping or if you have side effects. Tell him or her if you are allergic to any medicine. Keep a list of the medicines, vitamins, and herbs you take. Include the amounts, and when and why you take them. Bring the list or the pill bottles to follow-up visits. Carry your medicine list with you in case of an emergency.  Prevent or manage acute nausea and vomiting:    Do not drink alcohol. Alcohol may upset or irritate your stomach. Too much alcohol can also cause acute nausea and vomiting.    Control stress. Headaches due to stress may cause nausea and vomiting. Find ways to relax and manage your stress. Get more rest and sleep.    Drink more liquids as directed. Vomiting can lead to dehydration. It is important to drink more liquids to help replace lost body fluids. Ask your healthcare provider how much liquid to drink each day and which liquids are best for you. Your provider may recommend that you drink an oral rehydration solution (ORS). ORS contains water, salts, and sugar that are needed to replace the lost body fluids. Ask what kind of ORS to use, how much to drink, and where to get it.    Eat smaller meals, more often. Eat small amounts of food every 2 to 3 hours, even if you are not hungry. Food in your stomach may decrease your nausea.    Talk to your healthcare provider before you take over-the-counter (OTC) medicines. These medicines can cause serious problems if you use certain other medicines, or you have a medical condition. You may have problems if you use too much or use them for longer than the label says. Follow directions on the label carefully.  Follow up with your healthcare provider as directed: Write down your questions so you remember to ask them during your visits.

## 2023-01-18 NOTE — ED PROVIDER NOTE - PHYSICAL EXAMINATION
GEN:  Non-toxic appearing, non-distressed, speaking full sentences, non-diaphoretic, AAOx3  HEENT:  NCAT, neck supple, EOMI, PERRLA, sclera anicteric, no conjunctival pallor or injection, no stridor, normal voice, no tonsillar exudate, uvula midline  CV:  regular rhythm and rate, s1/s2 audible, no murmurs, rubs or gallops, peripheral pulses 2+ and symmetric  PULM:  non-labored respirations, lungs clear to auscultation bilaterally, no wheezes, crackles or rales  ABD:  non distended, non-tender, no rebound, no guarding, negative Argueta's sign, bowel sounds normal, no cvat  MSK:  no gross deformity, non-tender extremities and joints, range of motion grossly normal appearing, no extremity edema, extremities warm and well perfused   NEURO:  AAOx3, CN II-XII intact, motor 5/5 in upper and lower extremities bilaterally, sensation grossly intact in extremities and trunk, finger to nose testing wnl, no nystagmus, negative Romberg, no pronator drift, no gait deficit  SKIN:  warm, dry, no rash or vesicles

## 2023-01-18 NOTE — ED ADULT NURSE NOTE - NSSEPSISCRITERIAMET_ED_A_ED
Patient here for a follow up.  History   Smoking Status   • Never Smoker   Smokeless Tobacco   • Not on file     Medications reviewed and updated.  Denies known Latex allergy or symptoms of Latex sensitivity.  Health Maintenance Summary     Topic Due On Due Status Completed On    Colorectal Cancer Screening - Colonoscopy Jun 20, 2013 Overdue     IMMUNIZATION - DTaP/Tdap/Td Jun 20, 1982 Overdue     Immunization-Influenza Sep 1, 2017 Due On     Hepatitis C Screening Jun 20, 2014 Overdue           Patient is due for topics as listed above, he wishes to defer to PCP .       Abnormal Lactate

## 2023-01-18 NOTE — ED PROVIDER NOTE - CLINICAL SUMMARY MEDICAL DECISION MAKING FREE TEXT BOX
53-year-old male past medical history lumbar back pain presents for vomiting, anxiety, episode of resolved dizziness.  VSS AF.  EKG non-ischemic; no morphology consistent with Brugada syndrome, Mercedes-Parkinson-White syndrome, prolonged qt interval, HOCM, or arrythmogenic right ventricular hyperplasia. Low concern for acute coronary syndrome as chest pain non-exertional, non-radiating, and there is no diaphoresis.  Low concern for aortic dissection as chest pain non-acute onset, not radiating to back, not described as "tearing", no pulse or neuro deficit on exam.  Low concern for pulmonary embolism, patient is PERC negative with low pre-test probability of PE.  Possible viral syndrome vs. gastritis, although no abd pain currently.  Will check labs.  Dispo pending. 53-year-old male past medical history lumbar back pain presents for vomiting, anxiety, episode of resolved dizziness.  VSS AF.  EKG non-ischemic; no morphology consistent with Brugada syndrome, Mercedes-Parkinson-White syndrome, prolonged qt interval, HOCM, or arrythmogenic right ventricular hyperplasia. Low concern for acute coronary syndrome as chest pain non-exertional, non-radiating, and there is no diaphoresis.  Low concern for aortic dissection as chest pain non-acute onset, not radiating to back, not described as "tearing", no pulse or neuro deficit on exam.  Low concern for pulmonary embolism, patient is PERC negative with low pre-test probability of PE.  Possible viral syndrome vs. gastritis, although no abd pain currently.  Will check labs.  Dispo pending.    Labs independently reviewed.

## 2023-03-01 NOTE — PATIENT PROFILE ADULT - NSPRESCRALCSCORE_GEN_A_NUR_CAL
Patient reports acute nonproductive cough started several weeks ago a/w exertional dyspnea  Also noted chronic bilateral lower extremity edema started several years ago  - 2/14/23 LE doppler: No evidence of acute or chronic DVT  - 2/17/23 CXR: Cardiomegaly with increased bilateral opacities likely representing CHF  - 2/28/23 COVID/flu/RSV negative  - 2/28/23 ProBNP 95  - Follows outpatient cardiology, last visit 4/27/22  - Initial exam: +Bibasilar rales  Coarse lung sounds  No wheezing or crackles noted  Bilateral 3+ pitting edema with venous stasis dermatitis and very dry skin  - 3/1/23 ECHO: LVEF 60%  Normal systolic function  Normal wall motion  G1DD  Pulmonary artery systolic pressure is mildly increased    Assessment: Acute cough could be secondary to viral respiratory infection  However cannot r/o fluid overloaded state in setting of bilateral LE edema in bibasilar rales   DDx includes hypoalbuminemia vs venous insufficiency vs CHF    Plan:  - Gentle diuresis as renally tolerated  - Monitor I&O 8

## 2023-12-04 NOTE — PATIENT PROFILE ADULT - NSPROMUTPARTICIPCAREFT_GEN_A_NUR
PAUL 12/4/23 to inform patient of the cancellation of her appointment 12/22/23 due to Dickson Tapia not going to be in the office that day, someone from the office will call her back to reschedule
n/a

## 2024-05-20 NOTE — ED ADULT NURSE NOTE - CAS EDP DISCH TYPE
Home Retinoid Dermatitis Normal Treatment: I recommended more frequent application of Cetaphil or CeraVe to the areas of dermatitis. Completed Therapy?: No Male Completion Statement: After discussing his treatment course we decided to discontinue isotretinoin therapy at this time. He shouldn't donate blood for one month after the last dose. He should call with any new symptoms of depression. Next Month's Dosage: 40mg BID Use Enhanced Counseling Feature (Automatic): Yes Xerosis Aggressive Treatment: I recommended application of Cetaphil or CeraVe numerous times a day going to bed to all dry areas. I also prescribed a topical steroid for twice daily use. Hypercholesterolemia Monitoring: I explained this is common when taking isotretinoin. We will monitor closely. Headache Monitoring: I recommended monitoring the headaches for now. There is no evidence of increased intracranial pressure. They were instructed to call if the headaches are worsening. Dosing Month 4 (Required For Cumulative Dosing): 30mg BID Retinoid Dermatitis Aggressive Treatment: I recommended more frequent application of Cetaphil or CeraVe to the areas of dermatitis. I also prescribed a topical steroid for twice daily use until the dermatitis resolves. Nosebleeds Normal Treatment: I explained this is common when taking isotretinoin. I recommended saline mist in each nostril multiple times a day. If this worsens they will contact us. Ipledge Number (Optional): [id number] Lower Range (In Mg/Kg): 120 Cheilitis Normal Treatment: I recommended application of Vaseline or Aquaphor numerous times a day (as often as every hour) and before going to bed. Myalgia Monitoring: I explained this is common when taking isotretinoin. If this worsens they will contact us. Patient Weight (Optional But Required For Cumulative Dose-Numbers And Decimals Only): 245 Is Xerosis Present?: Yes - Normal Treatment Upper Range (In Mg/Kg): 150 Weight Units: pounds Target Cumulative Dosage (In Mg/Kg): 135 Cheilitis Aggressive Treatment: I recommended application of Vaseline or Aquaphor numerous times a day (as often as every hour) and before going to bed. I also prescribed a topical steroid for twice daily use. Myalgia Treatment: I explained this is common when taking isotretinoin. If this worsens they will contact us. They may try OTC ibuprofen. Xerosis Normal Treatment: I recommended application of Cetaphil or CeraVe numerous times a day and before going to bed to all dry areas. Counseling Text: I reviewed the side effect in detail. Patient should get monthly blood tests, not donate blood, not drive at night if vision affected, and not share medication. Xerosis Aggressive Treatment: I recommended application of Cetaphil or CeraVe numerous times a day and before going to bed to all dry areas. I also prescribed a topical steroid for twice daily use. Female Pregnancy Counseling Text: Female patients should also be on two forms of birth control while taking this medication and for one month after their last dose. Pounds Preamble Statement (Weight Entered In Details Tab): Reported Weight in pounds: Dosing Month 1 (Required For Cumulative Dosing): 20mg Daily Nosebleeds Normal Treatment: I explained this is common when taking isotretinoin. I recommended saline mist in each nostril multiple times a day, followed by aquaphor. If this worsens they will contact us. Detail Level: Zone Female Completion Statement: After discussing her treatment course we decided to discontinue isotretinoin therapy at this time. I explained that she would need to continue her birth control methods for at least one month after the last dosage. She should also get a pregnancy test one month after the last dose. She shouldn't donate blood for one month after the last dose. She should call with any new symptoms of depression. Dosing Month 2 (Required For Cumulative Dosing): 30mg Daily Use Therapeutic Ranged Or Therapeutic Target: Range Kilograms Preamble Statement (Weight Entered In Details Tab): Reported Weight in kilograms: Xerosis Normal Treatment: I recommended application of Cetaphil or CeraVe numerous times a day going to bed to all dry areas. What Is The Patient's Gender: Male

## 2024-05-23 NOTE — CONSULT NOTE ADULT - SUBJECTIVE AND OBJECTIVE BOX
HPI:  52 y/o male with pmh of hyperlipidemia and previous admission of pancreatitis in the setting of hypertriglyceridemia who presented to the ED for abdominal pain with nausea and episodes of emesis. The pt has been having abdominal discomfort described as a tight feeling with back pain for the past few days, however endorses that today it was a 10/10. Also noted to having 2 episodes of NBNB emesis. He also endorses alcohol use, 1 pint of gin 3x/week, with his last drink of 1 pint of gin approximately 2 days ago. While in the ED, the pt was found to have an elevated lipase of 227 and hypertriglyceridemia of 3127 with a CT of the abdomen and pelvis demonstrating acute interstitial edematous pancreatitis. The pt received 1L NS Bolus and 1L LR Bolus and morphine 4 mg IVP x2. He was recently admitted to the MICU on 9/2020 for pancreatitis secondary to hypertriglyceridemia with labs demonstrating triglycerides of 3801, lipase 774.8, and hyponatremia 122. He received an insulin gtt and IVF with an improvement of his triglycerides. Pt admitted to MICU for insulin gtt in the setting of hypertriglyceridemia and further monitoring. (06 Mar 2021 21:21)      PAST MEDICAL & SURGICAL HISTORY:  Dyslipidemia    Hypertension    No significant past surgical history        FAMILY HISTORY:  FH: GI cancer  father    Family history of bacterial pneumonia  mother        Social History:    Outpatient Medications:    MEDICATIONS  (STANDING):  atorvastatin 40 milliGRAM(s) Oral at bedtime  chlorhexidine 4% Liquid 1 Application(s) Topical <User Schedule>  dextrose 40% Gel 15 Gram(s) Oral once  dextrose 50% Injectable 25 Gram(s) IV Push once  dextrose 50% Injectable 25 Gram(s) IV Push once  dextrose 50% Injectable 12.5 Gram(s) IV Push once  enoxaparin Injectable 40 milliGRAM(s) SubCutaneous at bedtime  fenofibrate Tablet 145 milliGRAM(s) Oral daily  glucagon  Injectable 1 milliGRAM(s) IntraMuscular once  omega-3-Acid Ethyl Esters 2 Gram(s) Oral two times a day  PHENobarbital 64.8 milliGRAM(s) Oral every 6 hours    MEDICATIONS  (PRN):  morphine  - Injectable 4 milliGRAM(s) IV Push every 4 hours PRN Severe Pain (7 - 10)      Allergies    No Known Allergies    Intolerances      Review of Systems:  Constitutional: No fever  Eyes: No blurry vision  Neuro: No tremors  HEENT: No pain  Cardiovascular: No chest pain, palpitations  Respiratory: No SOB, no cough  GI: No nausea, vomiting, abdominal pain  : No dysuria  Skin: no rash  Psych: no depression  Endocrine: no polyuria, polydipsia  Hem/lymph: no swelling  Osteoporosis: no fractures    ALL OTHER SYSTEMS REVIEWED AND NEGATIVE        PHYSICAL EXAM:  VITALS: T(C): 36.6 (03-08-21 @ 06:26)  T(F): 97.8 (03-08-21 @ 06:26), Max: 98.6 (03-07-21 @ 20:00)  HR: 64 (03-08-21 @ 06:26) (57 - 74)  BP: 136/88 (03-08-21 @ 06:26) (128/76 - 148/85)  RR:  (13 - 19)  SpO2:  (96% - 100%)  Wt(kg): --  GENERAL: NAD, well-groomed, well-developed  EYES: No proptosis, no lid lag, anicteric  HEENT:  Atraumatic, Normocephalic, moist mucous membranes  THYROID: Normal size, no palpable nodules  RESPIRATORY: Clear to auscultation bilaterally; No rales, rhonchi, wheezing, or rubs  CARDIOVASCULAR: Regular rate and rhythm; No murmurs; no peripheral edema  GI: Soft, nontender, non distended, normal bowel sounds  SKIN: Dry, intact, No rashes or lesions  MUSCULOSKELETAL: Full range of motion, normal strength  NEURO: sensation intact, extraocular movements intact, no tremor, normal reflexes  PSYCH: Alert and oriented x 3, normal affect, normal mood  CUSHING'S SIGNS: no striae    POCT Blood Glucose.: 95 mg/dL (03-08-21 @ 09:08)  POCT Blood Glucose.: 122 mg/dL (03-08-21 @ 03:52)  POCT Blood Glucose.: 75 mg/dL (03-08-21 @ 03:02)  POCT Blood Glucose.: 77 mg/dL (03-07-21 @ 22:04)  POCT Blood Glucose.: 83 mg/dL (03-07-21 @ 20:34)  POCT Blood Glucose.: 95 mg/dL (03-07-21 @ 19:28)  POCT Blood Glucose.: 104 mg/dL (03-07-21 @ 18:08)  POCT Blood Glucose.: 114 mg/dL (03-07-21 @ 17:12)  POCT Blood Glucose.: 112 mg/dL (03-07-21 @ 15:57)  POCT Blood Glucose.: 111 mg/dL (03-07-21 @ 15:03)  POCT Blood Glucose.: 106 mg/dL (03-07-21 @ 13:37)  POCT Blood Glucose.: 98 mg/dL (03-07-21 @ 12:36)  POCT Blood Glucose.: 104 mg/dL (03-07-21 @ 11:02)  POCT Blood Glucose.: 113 mg/dL (03-07-21 @ 10:20)  POCT Blood Glucose.: 106 mg/dL (03-07-21 @ 09:19)  POCT Blood Glucose.: 91 mg/dL (03-07-21 @ 08:02)  POCT Blood Glucose.: 136 mg/dL (03-07-21 @ 06:20)  POCT Blood Glucose.: 56 mg/dL (03-07-21 @ 05:32)  POCT Blood Glucose.: 89 mg/dL (03-07-21 @ 04:18)  POCT Blood Glucose.: 80 mg/dL (03-07-21 @ 03:07)  POCT Blood Glucose.: 222 mg/dL (03-07-21 @ 02:22)  POCT Blood Glucose.: 66 mg/dL (03-07-21 @ 01:59)  POCT Blood Glucose.: 86 mg/dL (03-07-21 @ 00:59)  POCT Blood Glucose.: 91 mg/dL (03-06-21 @ 23:42)                            12.9   4.41  )-----------( 83       ( 08 Mar 2021 03:28 )             40.6       03-08    133<L>  |  100  |  4<L>  ----------------------------<  86  3.9   |  24  |  0.80    EGFR if : 119  EGFR if non : 103    Ca    9.1      03-08  Mg     1.9     03-08  Phos  2.6     03-08    TPro  6.3  /  Alb  3.1<L>  /  TBili  1.7<H>  /  DBili  x   /  AST  105<H>  /  ALT  108<H>  /  AlkPhos  92  03-08      Thyroid Function Tests:          03-08 Chol -- LDL -- HDL -- Trig 417<H>, 03-07 Chol -- LDL -- HDL -- Trig 383<H>, 03-07 Chol -- LDL -- HDL -- Trig 588<H>, 03-07 Chol -- LDL -- HDL -- Trig 1017<H>, 03-07 Chol -- LDL -- HDL -- Trig 1256<H>, 03-06 Chol -- LDL -- HDL -- Trig 3127<H>    Radiology:                  HPI:  52 y/o male with pmh of hyperlipidemia and previous admission of pancreatitis in the setting of hypertriglyceridemia who presented to the ED for abdominal pain with nausea and episodes of emesis. The pt has been having abdominal discomfort described as a tight feeling with back pain for the past few days, however endorses that today it was a 10/10. Also noted to having 2 episodes of NBNB emesis. He also endorses alcohol use, 1 pint of gin 3x/week, with his last drink of 1 pint of gin approximately 2 days ago. While in the ED, the pt was found to have an elevated lipase of 227 and hypertriglyceridemia of 3127 with a CT of the abdomen and pelvis demonstrating acute interstitial edematous pancreatitis. The pt received 1L NS Bolus and 1L LR Bolus and morphine 4 mg IVP x2. He was recently admitted to the MICU on 9/2020 for pancreatitis secondary to hypertriglyceridemia with labs demonstrating triglycerides of 3801, lipase 774.8, and hyponatremia 122. He received an insulin gtt and IVF with an improvement of his triglycerides. Pt admitted to MICU for insulin gtt in the setting of hypertriglyceridemia and further monitoring. (06 Mar 2021 21:21)      Endocrine History:   51 yr old M with PMH of HTG induced pancreatitis and ETOH use here with acute pancreatitis. Patient was seen by endocrine team in Sept 2020.               PAST MEDICAL & SURGICAL HISTORY:  Dyslipidemia    Hypertension    No significant past surgical history        FAMILY HISTORY:  FH: GI cancer  father    Family history of bacterial pneumonia  mother        Social History:    Outpatient Medications:    MEDICATIONS  (STANDING):  atorvastatin 40 milliGRAM(s) Oral at bedtime  chlorhexidine 4% Liquid 1 Application(s) Topical <User Schedule>  dextrose 40% Gel 15 Gram(s) Oral once  dextrose 50% Injectable 25 Gram(s) IV Push once  dextrose 50% Injectable 25 Gram(s) IV Push once  dextrose 50% Injectable 12.5 Gram(s) IV Push once  enoxaparin Injectable 40 milliGRAM(s) SubCutaneous at bedtime  fenofibrate Tablet 145 milliGRAM(s) Oral daily  glucagon  Injectable 1 milliGRAM(s) IntraMuscular once  omega-3-Acid Ethyl Esters 2 Gram(s) Oral two times a day  PHENobarbital 64.8 milliGRAM(s) Oral every 6 hours    MEDICATIONS  (PRN):  morphine  - Injectable 4 milliGRAM(s) IV Push every 4 hours PRN Severe Pain (7 - 10)      Allergies    No Known Allergies    Intolerances      Review of Systems:  Constitutional: No fever  Eyes: No blurry vision  Neuro: No tremors  HEENT: No pain  Cardiovascular: No chest pain, palpitations  Respiratory: No SOB, no cough  GI: No nausea, vomiting, abdominal pain  : No dysuria  Skin: no rash  Psych: no depression  Endocrine: no polyuria, polydipsia  Hem/lymph: no swelling  Osteoporosis: no fractures    ALL OTHER SYSTEMS REVIEWED AND NEGATIVE        PHYSICAL EXAM:  VITALS: T(C): 36.6 (03-08-21 @ 06:26)  T(F): 97.8 (03-08-21 @ 06:26), Max: 98.6 (03-07-21 @ 20:00)  HR: 64 (03-08-21 @ 06:26) (57 - 74)  BP: 136/88 (03-08-21 @ 06:26) (128/76 - 148/85)  RR:  (13 - 19)  SpO2:  (96% - 100%)  Wt(kg): --  GENERAL: NAD, well-groomed, well-developed  EYES: No proptosis, no lid lag, anicteric  HEENT:  Atraumatic, Normocephalic, moist mucous membranes  THYROID: Normal size, no palpable nodules  RESPIRATORY: Clear to auscultation bilaterally; No rales, rhonchi, wheezing, or rubs  CARDIOVASCULAR: Regular rate and rhythm; No murmurs; no peripheral edema  GI: Soft, nontender, non distended, normal bowel sounds  SKIN: Dry, intact, No rashes or lesions  MUSCULOSKELETAL: Full range of motion, normal strength  NEURO: sensation intact, extraocular movements intact, no tremor, normal reflexes  PSYCH: Alert and oriented x 3, normal affect, normal mood  CUSHING'S SIGNS: no striae    POCT Blood Glucose.: 95 mg/dL (03-08-21 @ 09:08)  POCT Blood Glucose.: 122 mg/dL (03-08-21 @ 03:52)  POCT Blood Glucose.: 75 mg/dL (03-08-21 @ 03:02)  POCT Blood Glucose.: 77 mg/dL (03-07-21 @ 22:04)  POCT Blood Glucose.: 83 mg/dL (03-07-21 @ 20:34)  POCT Blood Glucose.: 95 mg/dL (03-07-21 @ 19:28)  POCT Blood Glucose.: 104 mg/dL (03-07-21 @ 18:08)  POCT Blood Glucose.: 114 mg/dL (03-07-21 @ 17:12)  POCT Blood Glucose.: 112 mg/dL (03-07-21 @ 15:57)  POCT Blood Glucose.: 111 mg/dL (03-07-21 @ 15:03)  POCT Blood Glucose.: 106 mg/dL (03-07-21 @ 13:37)  POCT Blood Glucose.: 98 mg/dL (03-07-21 @ 12:36)  POCT Blood Glucose.: 104 mg/dL (03-07-21 @ 11:02)  POCT Blood Glucose.: 113 mg/dL (03-07-21 @ 10:20)  POCT Blood Glucose.: 106 mg/dL (03-07-21 @ 09:19)  POCT Blood Glucose.: 91 mg/dL (03-07-21 @ 08:02)  POCT Blood Glucose.: 136 mg/dL (03-07-21 @ 06:20)  POCT Blood Glucose.: 56 mg/dL (03-07-21 @ 05:32)  POCT Blood Glucose.: 89 mg/dL (03-07-21 @ 04:18)  POCT Blood Glucose.: 80 mg/dL (03-07-21 @ 03:07)  POCT Blood Glucose.: 222 mg/dL (03-07-21 @ 02:22)  POCT Blood Glucose.: 66 mg/dL (03-07-21 @ 01:59)  POCT Blood Glucose.: 86 mg/dL (03-07-21 @ 00:59)  POCT Blood Glucose.: 91 mg/dL (03-06-21 @ 23:42)                            12.9   4.41  )-----------( 83       ( 08 Mar 2021 03:28 )             40.6       03-08    133<L>  |  100  |  4<L>  ----------------------------<  86  3.9   |  24  |  0.80    EGFR if : 119  EGFR if non : 103    Ca    9.1      03-08  Mg     1.9     03-08  Phos  2.6     03-08    TPro  6.3  /  Alb  3.1<L>  /  TBili  1.7<H>  /  DBili  x   /  AST  105<H>  /  ALT  108<H>  /  AlkPhos  92  03-08      Thyroid Function Tests:          03-08 Chol -- LDL -- HDL -- Trig 417<H>, 03-07 Chol -- LDL -- HDL -- Trig 383<H>, 03-07 Chol -- LDL -- HDL -- Trig 588<H>, 03-07 Chol -- LDL -- HDL -- Trig 1017<H>, 03-07 Chol -- LDL -- HDL -- Trig 1256<H>, 03-06 Chol -- LDL -- HDL -- Trig 3127<H>    Radiology:                  HPI:  52 y/o male with pmh of hyperlipidemia and previous admission of pancreatitis in the setting of hypertriglyceridemia who presented to the ED for abdominal pain with nausea and episodes of emesis. The pt has been having abdominal discomfort described as a tight feeling with back pain for the past few days, however endorses that today it was a 10/10. Also noted to having 2 episodes of NBNB emesis. He also endorses alcohol use, 1 pint of gin 3x/week, with his last drink of 1 pint of gin approximately 2 days ago. While in the ED, the pt was found to have an elevated lipase of 227 and hypertriglyceridemia of 3127 with a CT of the abdomen and pelvis demonstrating acute interstitial edematous pancreatitis. The pt received 1L NS Bolus and 1L LR Bolus and morphine 4 mg IVP x2. He was recently admitted to the MICU on 9/2020 for pancreatitis secondary to hypertriglyceridemia with labs demonstrating triglycerides of 3801, lipase 774.8, and hyponatremia 122. He received an insulin gtt and IVF with an improvement of his triglycerides. Pt admitted to MICU for insulin gtt in the setting of hypertriglyceridemia and further monitoring. (06 Mar 2021 21:21)      Endocrine History:   51 yr old M with PMH of HTG induced pancreatitis and ETOH use here with acute pancreatitis. Patient was seen by endocrine team in Sept 2020. At that time he was recommended to modify his diet, avoid ETOH use and follow up with Lipid center. Has not followed up as outpt. He states he was taking atorvastatin 40mg daily at home. He is unsure of the two other medications that were prescribed (fenofibrate and lovaza). Has been drinking 1 pint of gin/3 X week. Noted to have normal A1C in Aug 2020. Here patient was treated with insulin gtt when discovered to have TG 3801. Now off insulin gtt. Last . Pt is also found to be COVID +.       PAST MEDICAL & SURGICAL HISTORY:  Dyslipidemia    Hypertension    No significant past surgical history        FAMILY HISTORY:  FH: GI cancer  father    Family history of bacterial pneumonia  mother        Social History: ETOH dependence, No illicit drug use    Outpatient Medications:   · 	omega-3 polyunsaturated fatty acids ethyl esters 1000 mg oral capsule: 2 cap(s) orally 2 times a day (unsure if taking)  · 	fenofibrate 145 mg oral tablet: 1 tab(s) orally once a day (unsure if taking)  · 	oxycodone-acetaminophen 5 mg-300 mg oral tablet: 1 tab(s) orally every 6 hours, As Needed -for severe pain MDD:4 tabs   · 	atorvastatin 40 mg oral tablet: 1 tab(s) orally once a day (at bedtime)  · 	traMADol 50 mg oral tablet: 25 milligram(s) orally 2 times a day, As Needed -for moderate pain MDD:MDD 50 mg  · 	thiamine 100 mg oral tablet: 1 tab(s) orally once a day  · 	folic acid 1 mg oral tablet: 1 tab(s) orally once a day  · 	Multiple Vitamins oral tablet: 1 tab(s) orally once a day    MEDICATIONS  (STANDING):  atorvastatin 40 milliGRAM(s) Oral at bedtime  chlorhexidine 4% Liquid 1 Application(s) Topical <User Schedule>  dextrose 40% Gel 15 Gram(s) Oral once  dextrose 50% Injectable 25 Gram(s) IV Push once  dextrose 50% Injectable 25 Gram(s) IV Push once  dextrose 50% Injectable 12.5 Gram(s) IV Push once  enoxaparin Injectable 40 milliGRAM(s) SubCutaneous at bedtime  fenofibrate Tablet 145 milliGRAM(s) Oral daily  glucagon  Injectable 1 milliGRAM(s) IntraMuscular once  omega-3-Acid Ethyl Esters 2 Gram(s) Oral two times a day  PHENobarbital 64.8 milliGRAM(s) Oral every 6 hours    MEDICATIONS  (PRN):  morphine  - Injectable 4 milliGRAM(s) IV Push every 4 hours PRN Severe Pain (7 - 10)      Allergies    No Known Allergies    Intolerances      Review of Systems:  Constitutional: No fever  Eyes: No blurry vision  Neuro: No tremors  HEENT: No pain  Cardiovascular: No chest pain, palpitations  Respiratory: No SOB, no cough  GI: No nausea, vomiting, abdominal pain  : No dysuria  Skin: no rash  Psych: no depression  Endocrine: no polyuria, polydipsia      ALL OTHER SYSTEMS REVIEWED AND NEGATIVE        PHYSICAL EXAM:  VITALS: T(C): 36.6 (03-08-21 @ 06:26)  T(F): 97.8 (03-08-21 @ 06:26), Max: 98.6 (03-07-21 @ 20:00)  HR: 64 (03-08-21 @ 06:26) (57 - 74)  BP: 136/88 (03-08-21 @ 06:26) (128/76 - 148/85)  RR:  (13 - 19)  SpO2:  (96% - 100%)  Wt(kg): --  GENERAL: NAD, well-groomed, well-developed  EYES: No proptosis, no lid lag, anicteric  HEENT:  Atraumatic, Normocephalic, moist mucous membranes  RESPIRATORY: Clear to auscultation bilaterally; No rales, rhonchi, wheezing, or rubs  CARDIOVASCULAR: Regular rate and rhythm  GI: Soft, nontender, non distended, normal bowel sounds  NEURO: sensation intact, extraocular movements intact, no tremor, normal reflexes  PSYCH: Alert and oriented x 3, normal affect, normal mood      POCT Blood Glucose.: 95 mg/dL (03-08-21 @ 09:08)  POCT Blood Glucose.: 122 mg/dL (03-08-21 @ 03:52)  POCT Blood Glucose.: 75 mg/dL (03-08-21 @ 03:02)  POCT Blood Glucose.: 77 mg/dL (03-07-21 @ 22:04)  POCT Blood Glucose.: 83 mg/dL (03-07-21 @ 20:34)  POCT Blood Glucose.: 95 mg/dL (03-07-21 @ 19:28)  POCT Blood Glucose.: 104 mg/dL (03-07-21 @ 18:08)  POCT Blood Glucose.: 114 mg/dL (03-07-21 @ 17:12)  POCT Blood Glucose.: 112 mg/dL (03-07-21 @ 15:57)  POCT Blood Glucose.: 111 mg/dL (03-07-21 @ 15:03)  POCT Blood Glucose.: 106 mg/dL (03-07-21 @ 13:37)  POCT Blood Glucose.: 98 mg/dL (03-07-21 @ 12:36)  POCT Blood Glucose.: 104 mg/dL (03-07-21 @ 11:02)  POCT Blood Glucose.: 113 mg/dL (03-07-21 @ 10:20)  POCT Blood Glucose.: 106 mg/dL (03-07-21 @ 09:19)  POCT Blood Glucose.: 91 mg/dL (03-07-21 @ 08:02)  POCT Blood Glucose.: 136 mg/dL (03-07-21 @ 06:20)  POCT Blood Glucose.: 56 mg/dL (03-07-21 @ 05:32)  POCT Blood Glucose.: 89 mg/dL (03-07-21 @ 04:18)  POCT Blood Glucose.: 80 mg/dL (03-07-21 @ 03:07)  POCT Blood Glucose.: 222 mg/dL (03-07-21 @ 02:22)  POCT Blood Glucose.: 66 mg/dL (03-07-21 @ 01:59)  POCT Blood Glucose.: 86 mg/dL (03-07-21 @ 00:59)  POCT Blood Glucose.: 91 mg/dL (03-06-21 @ 23:42)                            12.9   4.41  )-----------( 83       ( 08 Mar 2021 03:28 )             40.6       03-08    133<L>  |  100  |  4<L>  ----------------------------<  86  3.9   |  24  |  0.80    EGFR if : 119  EGFR if non : 103    Ca    9.1      03-08  Mg     1.9     03-08  Phos  2.6     03-08    TPro  6.3  /  Alb  3.1<L>  /  TBili  1.7<H>  /  DBili  x   /  AST  105<H>  /  ALT  108<H>  /  AlkPhos  92  03-08 03-08 Chol -- LDL -- HDL -- Trig 417<H>, 03-07 Chol -- LDL -- HDL -- Trig 383<H>, 03-07 Chol -- LDL -- HDL -- Trig 588<H>, 03-07 Chol -- LDL -- HDL -- Trig 1017<H>, 03-07 Chol -- LDL -- HDL -- Trig 1256<H>, 03-06 Chol -- LDL -- HDL -- Trig 3127<H>                 Never smoker

## 2024-06-14 NOTE — ED ADULT TRIAGE NOTE - BP NONINVASIVE DIASTOLIC (MM HG)
General Sunscreen Counseling: I recommended a broad spectrum sunscreen with a SPF of 30 or higher.  I explained that SPF 30 sunscreens block approximately 97 percent of the sun's harmful rays.  Sunscreens should be applied at least 15 minutes prior to expected sun exposure and then every 2 hours after that as long as sun exposure continues. If swimming or exercising sunscreen should be reapplied every 45 minutes to an hour after getting wet or sweating.  One ounce, or the equivalent of a shot glass full of sunscreen, is adequate to protect the skin not covered by a bathing suit. I also recommended a lip balm with a sunscreen. Sun protective clothing can be used in lieu of sunscreen but must be worn the entire time you are exposed to the sun's rays. Detail Level: Zone 101

## 2024-11-01 NOTE — ED PROVIDER NOTE - IV ALTEPLASE EXCL ABS HIDDEN
Brief Postoperative Note      Patient: Christian Martinez  YOB: 1954  MRN: [id number]    Date of Procedure: 11/1/2024    Pre-Op Diagnosis Codes:      * Special screening for malignant neoplasms, colon [Z12.11]    Post-Op Diagnosis: Same       Procedure(s):  COLONOSCOPY DIAGNOSTIC; POLYPECTOMY    Surgeon(s):  Zander Rojo MD    Assistant:  * No surgical staff found *    Anesthesia: Monitor Anesthesia Care    Estimated Blood Loss (mL): Minimal    Complications: None    Specimens:   ID Type Source Tests Collected by Time Destination   1 : CECUM POLYP Tissue Cecum SURGICAL PATHOLOGY Zander Rojo MD 11/1/2024 0830    2 : RECTAL POLYP Tissue Rectum SURGICAL PATHOLOGY Zander Rojo MD 11/1/2024 0835        Implants:  * No implants in log *      Drains: * No LDAs found *    Findings:  Infection Present At Time Of Surgery (PATOS) (choose all levels that have infection present):  No infection present  Other Findings: none    Electronically signed by Zander Rojo MD on 11/1/2024 at 8:44 AM  
show
Statement Selected

## 2025-01-24 NOTE — H&P ADULT - NSHPREVIEWOFSYSTEMS_GEN_ALL_CORE
14
REVIEW OF SYSTEMS:    CONSTITUTIONAL: No weakness, fevers or chills  EYES/ENT: No visual changes;  No vertigo or throat pain   NECK: No pain or stiffness  RESPIRATORY: No cough, wheezing, hemoptysis; No shortness of breath  CARDIOVASCULAR: No chest pain or palpitations  GASTROINTESTINAL: Abdominal discomfort, nausea, and vomiting. No diarrhea or constipation. No melena or hematochezia.  GENITOURINARY: No dysuria, frequency or hematuria  NEUROLOGICAL: No numbness or weakness  SKIN: No itching, rashes

## 2025-01-27 NOTE — PATIENT PROFILE ADULT - STATED REASON FOR ADMISSION
Detail Level: Detailed Depth Of Biopsy: dermis Was A Bandage Applied: Yes abdominal pain Size Of Lesion In Cm: 0.7 X Size Of Lesion In Cm: 0 Biopsy Type: H and E Biopsy Method: Dermablade Anesthesia Type: 2% Xylocaine with sodium bicarbonate Anesthesia Volume In Cc: 1 Hemostasis: Drysol Wound Care: Petrolatum Dressing: bandage Destruction After The Procedure: No Type Of Destruction Used: Curettage Curettage Text: The wound bed was treated with curettage after the biopsy was performed. Cryotherapy Text: The wound bed was treated with cryotherapy after the biopsy was performed. Electrodesiccation Text: The wound bed was treated with electrodesiccation after the biopsy was performed. Electrodesiccation And Curettage Text: The wound bed was treated with electrodesiccation and curettage after the biopsy was performed. Silver Nitrate Text: The wound bed was treated with silver nitrate after the biopsy was performed. Lab: [id number] Lab Facility: [id number] Medical Necessity Information: It is in your best interest to select a reason for this procedure from the list below. All of these items fulfill various CMS LCD requirements except the new and changing color options. Consent: Written consent was obtained and risks were reviewed including but not limited to scarring, infection, bleeding, scabbing, incomplete removal, nerve damage and allergy to anesthesia. Post-Care Instructions: I reviewed with the patient in detail post-care instructions. Patient is to keep the biopsy site dry overnight, and then apply bacitracin twice daily until healed. Patient may apply hydrogen peroxide soaks to remove any crusting. Notification Instructions: Patient will be notified of biopsy results. However, patient instructed to call the office if not contacted within 2 weeks. Billing Type: Third-Party Bill Information: Selecting Yes will display possible errors in your note based on the variables you have selected. This validation is only offered as a suggestion for you. PLEASE NOTE THAT THE VALIDATION TEXT WILL BE REMOVED WHEN YOU FINALIZE YOUR NOTE. IF YOU WANT TO FAX A PRELIMINARY NOTE YOU WILL NEED TO TOGGLE THIS TO 'NO' IF YOU DO NOT WANT IT IN YOUR FAXED NOTE.

## 2025-06-07 NOTE — DISCHARGE NOTE PROVIDER - NSDCCPCAREPLAN_GEN_ALL_CORE_FT
PRINCIPAL DISCHARGE DIAGNOSIS  Diagnosis: Pancreatitis  Assessment and Plan of Treatment: Your pancreas was inflamed due to high triglycerides and alcohol use. This prevented your pancreas from functioning properly and led to inflammation and pain. You were given fluids to treat the pancreatitis and your triglycerides reduced. You were eventually started on an oral diet, which you tolerated. Please continue to eat healthy foods and drink plenty of fluids. Please refrain from eat high fat foods (fast food, red meat, etc) or alcohol. Please follow up with your PCP Dr. Gomez and the lipic clinic.      SECONDARY DISCHARGE DIAGNOSES  Diagnosis: Hypertriglyceridemia  Assessment and Plan of Treatment: Your triglyceride level was elevated, which caused your pancreatitis. Your triglycerides were elevated due to your high fat diet and alcohol use. Please refrain from eat high fat foods (fast food, red meat, etc) or alcohol. Please follow up with your PCP Dr. Gomez and the lipic clinic. No